# Patient Record
Sex: FEMALE | Race: WHITE | NOT HISPANIC OR LATINO | Employment: OTHER | URBAN - METROPOLITAN AREA
[De-identification: names, ages, dates, MRNs, and addresses within clinical notes are randomized per-mention and may not be internally consistent; named-entity substitution may affect disease eponyms.]

---

## 2017-01-25 ENCOUNTER — PATIENT MESSAGE (OUTPATIENT)
Dept: ADMINISTRATIVE | Facility: OTHER | Age: 57
End: 2017-01-25

## 2017-02-19 ENCOUNTER — PATIENT MESSAGE (OUTPATIENT)
Dept: ADMINISTRATIVE | Facility: OTHER | Age: 57
End: 2017-02-19

## 2017-02-27 ENCOUNTER — PATIENT OUTREACH (OUTPATIENT)
Dept: OTHER | Facility: OTHER | Age: 57
End: 2017-02-27
Payer: COMMERCIAL

## 2017-02-27 NOTE — PROGRESS NOTES
Last 5 Patient Entered Readings                                                               Current 30 Day Average: 121/82     Recent Readings 2/25/2017 2/22/2017 2/22/2017 2/21/2017 2/21/2017    Systolic BP (mmHg) 126 - 109 - 123    Diastolic BP (mmHg) 84 - 79 - 84    Pulse 60 67 66 65 66        Initial introduction completed with the Ms. Fatuma Akins and the role of the health  was explained.   We discussed the following information:  Exercise - Patient goes to the gym 5 times per week for about 1-2 hours per day.  Diet - Pt does not add salt to meals and cooks a lot at home. She reads food labels and tries to find low sodium options. She uses My [employer]itness Pal to track meals and will try to start using this for her sodium intake.     Resources on diet and exercise were sent.     Patient is aware that I am not available for emergencies and to call 911 or [facility] on call if one arises.  Patient is aware of the importance of medication adherence.  Patient is aware of the importance of diet and exercise.  Patient is aware that his sodium intake should be no more than 2000mg per day.  Patient is aware that the recommended physical activity each week should be about 30 minutes per day at least 5 times per week.   Patient is aware of the importance of taking BP readings at least weekly if not more and during different times each day.  Patient is aware that the health  can be used as a resource for lifestyle modifications to help reduce or maintain a healthy BP

## 2017-03-01 ENCOUNTER — OFFICE VISIT (OUTPATIENT)
Dept: OPHTHALMOLOGY | Facility: CLINIC | Age: 57
End: 2017-03-01
Payer: COMMERCIAL

## 2017-03-01 DIAGNOSIS — H52.4 BILATERAL PRESBYOPIA: ICD-10-CM

## 2017-03-01 DIAGNOSIS — I10 ESSENTIAL HYPERTENSION: Primary | ICD-10-CM

## 2017-03-01 DIAGNOSIS — H52.13 MYOPIA, BILATERAL: ICD-10-CM

## 2017-03-01 DIAGNOSIS — Z01.00 ENCOUNTER FOR EYE EXAM: ICD-10-CM

## 2017-03-01 PROCEDURE — 92015 DETERMINE REFRACTIVE STATE: CPT | Mod: S$GLB,,, | Performed by: OPTOMETRIST

## 2017-03-01 PROCEDURE — 92014 COMPRE OPH EXAM EST PT 1/>: CPT | Mod: S$GLB,,, | Performed by: OPTOMETRIST

## 2017-03-01 PROCEDURE — 99999 PR PBB SHADOW E&M-EST. PATIENT-LVL I: CPT | Mod: PBBFAC,,, | Performed by: OPTOMETRIST

## 2017-03-01 NOTE — PROGRESS NOTES
HPI     No visual complaints. Last eye exam 02/25/2016 TRF. update glasses RX.       Last edited by Anabella Love on 3/1/2017  1:34 PM.         Assessment /Plan     For exam results, see Encounter Report.    Essential hypertension    Encounter for eye exam    Myopia, bilateral    Bilateral presbyopia      No HTN Retinopathy    Dispense Final Rx for glasses.  RTC 1 year

## 2017-03-14 ENCOUNTER — PATIENT OUTREACH (OUTPATIENT)
Dept: OTHER | Facility: OTHER | Age: 57
End: 2017-03-14
Payer: COMMERCIAL

## 2017-03-14 NOTE — LETTER
"   Ellen Werner, PharmD  [address] [place] [address]     Dear Fatuma Akins,    Welcome to the [facility]!         My name is Ellen Werner and I am your dedicated clinical pharmacist.  As an expert in medication management, I will help ensure that the medications you are taking continue to provide you with the intended benefits.      This is Yadira Vasquez and she will be your health  for the duration of the program.  Her job is to help you identify lifestyle changes to improve your blood pressure control.  You will talk about nutrition, exercise, and other ways that you may be able to adjust your current habits to better your health. Together, we will work to improve your overall health and encourage you to meet your goals for a healthier lifestyle.    What we expect from YOU:    You will need to take blood pressure readings multiple times a week and no less than one reading per week.   It is important that you take your measurements at different times during the day, when possible.     What you should expect from your Digital Medicine Care Team:   We will provide you with education about high blood pressure, including lifestyle changes that could help you to control your blood pressure.   We will review your weekly readings and provide you with monthly blood pressure progress reports after you have been in the program for more than 30 days.   We will send monthly progress reports on your blood pressure control to your physician so they can follow along with your progress as well.    You will be able to reach me by phone at [phone] or through your [facility] account by clicking "Send a message to your doctor's office" on the home screen then selecting my name in the "To the office of:" field.     I look forward to working with you to achieve your blood pressure goals!    Sincerely,    Ellen Werner, Zackery  Your personal Clinical " Pharmacist    Please visit [email or web address] to learn more about high blood pressure and what you can do lower your blood pressure.                                                                                         Fatuma Akins  [address]  [place] [address]

## 2017-03-21 NOTE — PROGRESS NOTES
Last 5 Patient Entered Readings                                                               Current 30 Day Average: 118/82     Recent Readings 3/20/2017 3/20/2017 3/16/2017 3/13/2017 3/12/2017    Systolic BP (mmHg) 106 115 123 108 124    Diastolic BP (mmHg) 72 81 93 77 81    Pulse 72 64 79 85 61        Hypertension Medications             hydrochlorothiazide (MICROZIDE) 12.5 mg capsule Take 1 capsule (12.5 mg total) by mouth once daily. -qam        Called patient to introduce her into the HDM (hypertension digital medicine) clinic.    Verified the following information with the patient:  Drug allergies  Medication adherence- Patient states she is adherent.     Screening questionnaires:  Depression- not indicated    Sleep apnea- not indicated    Explained that we expect her to obtain several blood pressures/week at random times of day.     Explained that our goal is to get her BP to consistently below 140/90mmHg.     Patient and I agreed that the patient will take her BP daily to every other day at varying times of the day.     I will plan to follow-up with the patient in 4 weeks.    Emailed patient link to [facility] webpage as well as my direct phone number in case she has in questions.

## 2017-03-28 ENCOUNTER — OFFICE VISIT (OUTPATIENT)
Dept: FAMILY MEDICINE | Facility: CLINIC | Age: 57
End: 2017-03-28
Payer: COMMERCIAL

## 2017-03-28 ENCOUNTER — LAB VISIT (OUTPATIENT)
Dept: LAB | Facility: HOSPITAL | Age: 57
End: 2017-03-28
Attending: FAMILY MEDICINE
Payer: COMMERCIAL

## 2017-03-28 VITALS
TEMPERATURE: 97 F | DIASTOLIC BLOOD PRESSURE: 84 MMHG | WEIGHT: 182.75 LBS | BODY MASS INDEX: 34.5 KG/M2 | HEIGHT: 61 IN | SYSTOLIC BLOOD PRESSURE: 120 MMHG | RESPIRATION RATE: 18 BRPM | HEART RATE: 76 BPM | OXYGEN SATURATION: 99 %

## 2017-03-28 DIAGNOSIS — E66.9 OBESITY (BMI 30.0-34.9): ICD-10-CM

## 2017-03-28 DIAGNOSIS — Z12.31 ENCOUNTER FOR SCREENING MAMMOGRAM FOR MALIGNANT NEOPLASM OF BREAST: ICD-10-CM

## 2017-03-28 DIAGNOSIS — I10 ESSENTIAL HYPERTENSION: ICD-10-CM

## 2017-03-28 DIAGNOSIS — E03.9 ACQUIRED HYPOTHYROIDISM: ICD-10-CM

## 2017-03-28 DIAGNOSIS — Z00.00 PREVENTATIVE HEALTH CARE: ICD-10-CM

## 2017-03-28 DIAGNOSIS — E78.1 HYPERTRIGLYCERIDEMIA: ICD-10-CM

## 2017-03-28 DIAGNOSIS — Z00.00 PREVENTATIVE HEALTH CARE: Primary | ICD-10-CM

## 2017-03-28 LAB
ALBUMIN SERPL BCP-MCNC: 4.1 G/DL
ALP SERPL-CCNC: 73 U/L
ALT SERPL W/O P-5'-P-CCNC: 23 U/L
ANION GAP SERPL CALC-SCNC: 11 MMOL/L
AST SERPL-CCNC: 21 U/L
BASOPHILS # BLD AUTO: 0.05 K/UL
BASOPHILS NFR BLD: 0.5 %
BILIRUB SERPL-MCNC: 0.7 MG/DL
BUN SERPL-MCNC: 11 MG/DL
CALCIUM SERPL-MCNC: 9.9 MG/DL
CHLORIDE SERPL-SCNC: 104 MMOL/L
CHOLEST/HDLC SERPL: 4.4 {RATIO}
CO2 SERPL-SCNC: 26 MMOL/L
CREAT SERPL-MCNC: 0.9 MG/DL
DIFFERENTIAL METHOD: NORMAL
EOSINOPHIL # BLD AUTO: 0.3 K/UL
EOSINOPHIL NFR BLD: 3.3 %
ERYTHROCYTE [DISTWIDTH] IN BLOOD BY AUTOMATED COUNT: 13.5 %
EST. GFR  (AFRICAN AMERICAN): >60 ML/MIN/1.73 M^2
EST. GFR  (NON AFRICAN AMERICAN): >60 ML/MIN/1.73 M^2
GLUCOSE SERPL-MCNC: 100 MG/DL
HCT VFR BLD AUTO: 44.3 %
HDL/CHOLESTEROL RATIO: 22.8 %
HDLC SERPL-MCNC: 206 MG/DL
HDLC SERPL-MCNC: 47 MG/DL
HGB BLD-MCNC: 14.7 G/DL
LDLC SERPL CALC-MCNC: 120.8 MG/DL
LYMPHOCYTES # BLD AUTO: 2.9 K/UL
LYMPHOCYTES NFR BLD: 31.8 %
MCH RBC QN AUTO: 29.4 PG
MCHC RBC AUTO-ENTMCNC: 33.2 %
MCV RBC AUTO: 89 FL
MONOCYTES # BLD AUTO: 0.8 K/UL
MONOCYTES NFR BLD: 8.6 %
NEUTROPHILS # BLD AUTO: 5.1 K/UL
NEUTROPHILS NFR BLD: 55.6 %
NONHDLC SERPL-MCNC: 159 MG/DL
PLATELET # BLD AUTO: 325 K/UL
PMV BLD AUTO: 9.8 FL
POTASSIUM SERPL-SCNC: 4.3 MMOL/L
PROT SERPL-MCNC: 7.2 G/DL
RBC # BLD AUTO: 5 M/UL
SODIUM SERPL-SCNC: 141 MMOL/L
TRIGL SERPL-MCNC: 191 MG/DL
TSH SERPL DL<=0.005 MIU/L-ACNC: 3 UIU/ML
WBC # BLD AUTO: 9.14 K/UL

## 2017-03-28 PROCEDURE — 99396 PREV VISIT EST AGE 40-64: CPT | Mod: S$GLB,,, | Performed by: FAMILY MEDICINE

## 2017-03-28 PROCEDURE — 85025 COMPLETE CBC W/AUTO DIFF WBC: CPT

## 2017-03-28 PROCEDURE — 3079F DIAST BP 80-89 MM HG: CPT | Mod: S$GLB,,, | Performed by: FAMILY MEDICINE

## 2017-03-28 PROCEDURE — 80061 LIPID PANEL: CPT

## 2017-03-28 PROCEDURE — 99999 PR PBB SHADOW E&M-EST. PATIENT-LVL III: CPT | Mod: PBBFAC,,, | Performed by: FAMILY MEDICINE

## 2017-03-28 PROCEDURE — 36415 COLL VENOUS BLD VENIPUNCTURE: CPT | Mod: PO

## 2017-03-28 PROCEDURE — 84443 ASSAY THYROID STIM HORMONE: CPT

## 2017-03-28 PROCEDURE — 3074F SYST BP LT 130 MM HG: CPT | Mod: S$GLB,,, | Performed by: FAMILY MEDICINE

## 2017-03-28 PROCEDURE — 80053 COMPREHEN METABOLIC PANEL: CPT

## 2017-03-28 NOTE — PROGRESS NOTES
CHIEF COMPLAINT: This is a 56-year-old female here for preventive health exam.    SUBJECTIVE: Patient's doing well without complaints.  She just started a weight loss diet.  She exercises regularly.  Her blood pressure is controlled on hydrochlorothiazide 12.5 mg daily.  She also takes levothyroxine for acquired hypothyroidism.     Eye exam March 2017. Pap smear March 2016, due again in March 2019.  Colonoscopy August 2013, due again in August 2023. Mammogram March 2015. Tdap August 2009. Flu vaccine November 2016.     ROS:  GENERAL: Patient denies fever, chills, night sweats. Patient denies weight gain or loss. Patient denies anorexia, fatigue, weakness or swollen glands.  SKIN: Patient denies rash or hair loss.  HEENT: Patient denies sore throat, ear pain, hearing loss, nasal congestion, or runny nose. Patient denies visual disturbance, eye irritation or discharge.  LUNGS: Patient denies cough, wheeze or hemoptysis.  CARDIOVASCULAR: Patient denies chest pain, shortness of breath, palpitations, syncope or lower extremity edema.  GI: Patient denies abdominal pain, nausea, vomiting, diarrhea, constipation, blood in stool or melena.  GENITOURINARY: Patient denies pelvic pain, vaginal discharge, itch or odor. Patient denies irregular vaginal bleeding. Patient denies dysuria, frequency, hematuria, nocturia, urgency or incontinence.  BREASTS: Patient denies breast pain, mass or nipple discharge.  MUSCULOSKELETAL: Patient denies joint pain, swelling, redness or warmth.  NEUROLOGIC: Patient denies headache, vertigo, paresthesias, weakness in limb, dysarthria, dysphagia or abnormality of gait.  PSYCHIATRIC: Patient denies anxiety, depression, or memory loss.     OBJECTIVE:   GENERAL: Well-developed well-nourished, obese, white female alert and oriented x3, in no acute distress. Memory, judgment and cognition without deficit.  SKIN: Clear without rash. Normal color and tone.  HEENT: Eyes: Clear conjunctivae. Pupils equal  reactive to light and accommodation. Ears: Clear TMs. Clear canals. Nose: Without congestion. Pharynx: Without injection or exudates.  NECK: Supple, normal range of motion. No masses, lymphadenopathy or enlarged thyroid. No JVD. Carotids 2+ and equal. No bruits.  LUNGS: Clear to auscultation. Normal respiratory effort.  CARDIOVASCULAR: Regular rhythm, normal S1, S2 without murmur, gallop or rub.  BACK: No CVA or spinal tenderness.  BREASTS: No masses, tenderness or nipple discharge.  ABDOMEN: Normal appearance. Active bowel sounds. Soft, nontender without mass or organomegaly. No rebound or guarding.  EXTREMITIES: Without cyanosis, clubbing or edema. Distal pulses 2+ and equal. Normal range of motion in all extremities. No joint effusion, erythema or warmth.  NEUROLOGIC: Cranial nerves II through XII without deficit. Motor strength equal bilaterally. Sensation normal to touch. Deep tendon reflexes 2+ and equal. Gait without abnormality. No tremor. Negative cerebellar signs.  PELVIC: External: Without lesions or inflammation. Vaginal: Clear, atrophic changes. Cervix: Friable, atrophic. Nontender.  No Pap. Uterus: Normal size and shape. Adnexa: Non-tender, without masses. Rectovaginal: Confirms, heme-negative stool x2.    ASSESSMENT:  1. Preventative health care    2. Acquired hypothyroidism    3. Essential hypertension    4. Hypertriglyceridemia    5. Obesity (BMI 30.0-34.9)    6. Encounter for screening mammogram for malignant neoplasm of breast      PLAN:   1.  Weight reduction.  Exercise regularly.  2.  Age-appropriate counseling.  3.  Fasting lab.  4.  Mammogram.  5.  Refill medications as needed.

## 2017-03-28 NOTE — MR AVS SNAPSHOT
[facility]  [address]  [place] [address]  Phone: [phone]                  Fatuma Akins   3/28/2017 9:00 AM   Office Visit    Description:  Female : 1960   Provider:  Mercy Fitzpatrick MD   Department:  [facility]           Reason for Visit     Annual Exam           Diagnoses this Visit        Comments    Preventative health care    -  Primary     Acquired hypothyroidism         Essential hypertension         Hypertriglyceridemia         Obesity (BMI 30.0-34.9)         Encounter for screening mammogram for malignant neoplasm of breast                To Do List           Future Appointments        Provider Department Dept Phone    3/28/2017 10:20 AM LABORATORY, [address] [facility] [phone]    2017 10:30 AM [facility] MAMMO1-SCR [facility] [phone]    3/2/2018 1:30 PM [facility] - Ophthalmology [phone]      Goals (5 Years of Data)              Today    3/27/17    3/20/17    Blood Pressure <= 140/90   120/84  120/84  120/84    Notes - Note created  2017  2:52 PM by Yadira Vasquez    It is important to consistently maintain a controlled blood pressure.      Exercise at least 150 minutes per week.           Notes - Note created  3/3/2017 12:26 PM by Yadira Vasquez    Continue going to the gym 5 days per week for 1-2 hours each time.      Take at least one BP reading per week at various times of the day           Weight (lb) < 0   82.9 kg (182 lb 12.2 oz)          [facility] On Call     [facility] On Call Nurse Care Line -  Assistance  Registered nurses in the [facility] Center provide clinical advisement, health education, appointment booking, and other advisory services.  Call for this free service at [phone].             Medications           Message regarding Medications     Verify the changes and/or additions to your medication regime listed  "below are the same as discussed with your clinician today.  If any of these changes or additions are incorrect, please notify your healthcare provider.             Verify that the below list of medications is an accurate representation of the medications you are currently taking.  If none reported, the list may be blank. If incorrect, please contact your healthcare provider. Carry this list with you in case of emergency.           Current Medications     calcium-vitamin D3 500 mg(1,250mg) -200 unit per tablet Take 1 tablet by mouth 2 (two) times daily with meals.    coQ10, ubiquinol, 200 mg Cap Take by mouth.    fish oil-omega-3 fatty acids 300-1,000 mg capsule Take 2 g by mouth once daily.    hydrochlorothiazide (MICROZIDE) 12.5 mg capsule Take 1 capsule (12.5 mg total) by mouth once daily.    levothyroxine (SYNTHROID) 50 MCG tablet TAKE 1 TABLET (50 MCG TOTAL) BY MOUTH ONCE DAILY.    magnesium 250 mg Tab Take 1 tablet by mouth once daily.    multivitamin with minerals tablet Take 1 tablet by mouth once daily.           Clinical Reference Information           Your Vitals Were     BP Pulse Temp Resp Height Weight    120/84 76 96.6 °F (35.9 °C) (Tympanic) 18 5' 1" (1.549 m) 82.9 kg (182 lb 12.2 oz)    SpO2 BMI             99% 34.53 kg/m2         Blood Pressure          Most Recent Value    BP  120/84      Allergies as of 3/28/2017     No Known Allergies      Immunizations Administered on Date of Encounter - 3/28/2017     None      Orders Placed During Today's Visit     Future Labs/Procedures Expected by Expires    CBC auto differential  3/28/2017 5/27/2018    Comprehensive metabolic panel  3/28/2017 5/27/2018    Lipid panel  3/28/2017 5/27/2018    Mammo Digital Screening Bilat with CAD  3/28/2017 5/28/2018    TSH  3/28/2017 5/27/2018      Language Assistance Services     ATTENTION: Language assistance services are available, free of charge. Please call [phone].      ATENCIÓN: chen Peralta " disposición servicios gratuitos de asistencia lingüística. Edwardestella al [phone].     LEANDRO Ý: N?u b?n nói Ti?ng Vi?t, có các d?ch v? h? tr? ngôn ng? mi?n phí dành cho b?n. G?i s? [phone].         [facility] complies with applicable Federal civil rights laws and does not discriminate on the basis of race, color, national origin, age, disability, or sex.

## 2017-03-30 ENCOUNTER — PATIENT OUTREACH (OUTPATIENT)
Dept: OTHER | Facility: OTHER | Age: 57
End: 2017-03-30
Payer: COMMERCIAL

## 2017-03-30 NOTE — PROGRESS NOTES
Last 5 Patient Entered Readings                                                               Current 30 Day Average: 116/82     Recent Readings 3/27/2017 3/20/2017 3/20/2017 3/16/2017 3/13/2017    Systolic BP (mmHg) 116 106 115 123 108    Diastolic BP (mmHg) 84 72 81 93 77    Pulse 66 72 64 79 85        LVM to follow up with Ms. Fatuma Akins. Inquired about how her low sodium diet and exercise is going. Overall, her readings are looking good and she continues to take her readings weekly. Advised pt to call or message back if she has any questions or concerns.

## 2017-04-03 ENCOUNTER — HOSPITAL ENCOUNTER (OUTPATIENT)
Dept: RADIOLOGY | Facility: HOSPITAL | Age: 57
Discharge: HOME | End: 2017-04-03
Attending: FAMILY MEDICINE
Payer: COMMERCIAL

## 2017-04-03 DIAGNOSIS — Z12.31 ENCOUNTER FOR SCREENING MAMMOGRAM FOR MALIGNANT NEOPLASM OF BREAST: ICD-10-CM

## 2017-04-03 PROCEDURE — 77067 SCR MAMMO BI INCL CAD: CPT | Mod: TC

## 2017-04-03 PROCEDURE — 77067 SCR MAMMO BI INCL CAD: CPT | Mod: 26,,, | Performed by: RADIOLOGY

## 2017-04-25 ENCOUNTER — PATIENT OUTREACH (OUTPATIENT)
Dept: OTHER | Facility: OTHER | Age: 57
End: 2017-04-25
Payer: COMMERCIAL

## 2017-04-25 NOTE — PROGRESS NOTES
Last 5 Patient Entered Readings                                                               Current 30 Day Average: 116/81     Recent Readings 4/23/2017 4/23/2017 4/13/2017 4/5/2017 3/27/2017    Systolic BP (mmHg) 131 129 106 112 116    Diastolic BP (mmHg) 89 94 74 78 84    Pulse 64 64 68 74 66        Hypertension Medications             hydrochlorothiazide (MICROZIDE) 12.5 mg capsule Take 1 capsule (12.5 mg total) by mouth once daily.        Plan:   Called patient to follow up. Per current 30 day average, BP is well controlled. LVM.   Will continue to monitor. WCB in 4 months, sooner if BP begins to trend up or down.

## 2017-06-01 ENCOUNTER — PATIENT OUTREACH (OUTPATIENT)
Dept: OTHER | Facility: OTHER | Age: 57
End: 2017-06-01
Payer: COMMERCIAL

## 2017-06-01 NOTE — PROGRESS NOTES
Last 5 Patient Entered Readings                                                               Current 30 Day Average: 115/82     Recent Readings 5/31/2017 5/22/2017 5/16/2017 5/8/2017 4/30/2017    Systolic BP (mmHg) 114 121 115 112 114    Diastolic BP (mmHg) 82 82 81 86 84    Pulse 67 71 78 67 68        LVM to follow up with Mrs. Fatuma Akins. Inquired about how her low sodium diet and exercise is going. Overall, her BP readings are looking good and she continues to take them weekly.  Advised pt to call or message back if she has any questions or concerns.

## 2017-06-14 RX ORDER — HYDROCHLOROTHIAZIDE 12.5 MG/1
12.5 CAPSULE ORAL DAILY
Qty: 90 CAPSULE | Refills: 3 | Status: SHIPPED | OUTPATIENT
Start: 2017-06-14 | End: 2017-07-26 | Stop reason: SDUPTHER

## 2017-06-14 RX ORDER — LEVOTHYROXINE SODIUM 50 UG/1
TABLET ORAL
Qty: 90 TABLET | Refills: 3 | Status: SHIPPED | OUTPATIENT
Start: 2017-06-14 | End: 2017-07-26 | Stop reason: SDUPTHER

## 2017-07-06 ENCOUNTER — PATIENT OUTREACH (OUTPATIENT)
Dept: OTHER | Facility: OTHER | Age: 57
End: 2017-07-06

## 2017-07-06 NOTE — PROGRESS NOTES
Last 5 Patient Entered Readings                                                               Current 30 Day Average: 116/83     Recent Readings 7/5/2017 6/28/2017 6/22/2017 6/14/2017 6/5/2017    Systolic BP (mmHg) 119 113 108 120 119    Diastolic BP (mmHg) 81 84 82 87 81    Pulse 63 59 65 65 65            LVM to follow up with Mrs. Fatuma Akins. Inquired about how her low sodium diet and exercise is going. Overall, her readings are looking good. I just reminded her to continue working on getting at least one reading in each week. Advised pt to call or message back if she has any questions or concerns.

## 2017-07-26 RX ORDER — LEVOTHYROXINE SODIUM 50 UG/1
50 TABLET ORAL DAILY
Qty: 90 TABLET | Refills: 2 | Status: SHIPPED | OUTPATIENT
Start: 2017-07-26 | End: 2018-04-18 | Stop reason: SDUPTHER

## 2017-07-26 RX ORDER — HYDROCHLOROTHIAZIDE 12.5 MG/1
12.5 CAPSULE ORAL DAILY
Qty: 90 CAPSULE | Refills: 2 | Status: SHIPPED | OUTPATIENT
Start: 2017-07-26 | End: 2018-04-18 | Stop reason: SDUPTHER

## 2017-08-09 ENCOUNTER — PATIENT OUTREACH (OUTPATIENT)
Dept: OTHER | Facility: OTHER | Age: 57
End: 2017-08-09

## 2017-08-09 NOTE — PROGRESS NOTES
Last 5 Patient Entered Redings Current 30 Day Average: 115/83     Recent Readings 8/7/2017 8/1/2017 7/25/2017 7/17/2017 7/10/2017    Systolic BP (mmHg) 103 115 126 118 114    Diastolic BP (mmHg) 74 86 87 83 86    Pulse 70 73 64 66 67          LVM to follow up with Mrs. Fatuma Akins. Inquired about how her low sodium diet and exercise is going. Advised pt to call or message back if she has any questions or concerns.    Are the patients BP readings overall controlled? yes  Does the patient take weekly BP readings? yes  Did you request for the patient to call or message you back? no  If yes, then why? NA    Reminded patient about what to do incase of a medical emergency (call 911, call [facility] on call or go to the ER).     Provided patient with my contact information.

## 2017-08-25 ENCOUNTER — PATIENT OUTREACH (OUTPATIENT)
Dept: OTHER | Facility: OTHER | Age: 57
End: 2017-08-25

## 2017-08-25 NOTE — PROGRESS NOTES
Last 5 Patient Entered Redings Current 30 Day Average: 111/78     Recent Readings 8/22/2017 8/15/2017 8/7/2017 8/1/2017 7/25/2017    Systolic BP (mmHg) 109 115 103 115 126    Diastolic BP (mmHg) 73 77 74 86 87    Pulse 71 68 70 73 64        Hypertension Medications             hydrochlorothiazide (MICROZIDE) 12.5 mg capsule Take 1 capsule (12.5 mg total) by mouth once daily.        Plan:   Called patient to follow up. Per current 30 day average, BP is well controlled.   LVM, requested patient call back if she has any questions or concerns.   Will continue to monitor. WCB in 4 months, sooner if BP begins to trend up or down.

## 2017-10-02 ENCOUNTER — PATIENT OUTREACH (OUTPATIENT)
Dept: OTHER | Facility: OTHER | Age: 57
End: 2017-10-02

## 2017-10-02 NOTE — PROGRESS NOTES
Last 5 Patient Entered Redings Current 30 Day Average: 118/84     Recent Readings 10/2/2017 9/25/2017 9/18/2017 9/11/2017 9/4/2017    Systolic BP (mmHg) 125 108 116 122 117    Diastolic BP (mmHg) 88 79 81 86 85    Pulse 66 66 67 68 59          Hypertension Digital Medicine (HDMP) Health  Follow Up    LVM to follow up with Mrs. Fatuma MERRILL Tashi.    Per 30 day average, blood pressure is well controlled 118/84 mmHg. Encouraged adherence to low sodium diet and physical activity guidelines. Advised patient to call or message with questions or concerns. WCB in 3-4 weeks.

## 2017-10-12 ENCOUNTER — TELEPHONE (OUTPATIENT)
Dept: OPHTHALMOLOGY | Facility: CLINIC | Age: 57
End: 2017-10-12

## 2017-10-12 NOTE — TELEPHONE ENCOUNTER
----- Message from Rajesh Lei sent at 10/12/2017 11:59 AM CDT -----  Contact: patient  Patient is calling requesting that Dr Hamilton or Dr. Hamilton's Nurse would give her a call at [phone]. Thank You.

## 2017-10-17 ENCOUNTER — OFFICE VISIT (OUTPATIENT)
Dept: OPHTHALMOLOGY | Facility: CLINIC | Age: 57
End: 2017-10-17
Payer: COMMERCIAL

## 2017-10-17 DIAGNOSIS — H00.15 CHALAZION OF LEFT LOWER EYELID: Primary | ICD-10-CM

## 2017-10-17 PROCEDURE — 92015 DETERMINE REFRACTIVE STATE: CPT | Mod: S$GLB,,, | Performed by: OPTOMETRIST

## 2017-10-17 PROCEDURE — 92012 INTRM OPH EXAM EST PATIENT: CPT | Mod: S$GLB,,, | Performed by: OPTOMETRIST

## 2017-10-17 PROCEDURE — 99999 PR PBB SHADOW E&M-EST. PATIENT-LVL I: CPT | Mod: PBBFAC,,, | Performed by: OPTOMETRIST

## 2017-10-17 NOTE — PROGRESS NOTES
HPI     Patient states glasses broke wants a refraction for glasses to make sure   there has not been any changes. Last eye exam 03/01/2017 TRF.    Last edited by Anabella Love on 10/17/2017 10:06 AM. (History)            Assessment /Plan     For exam results, see Encounter Report.    Chalazion of left lower eyelid      Warm compresses qid OS    Dispense Final Rx for glasses.  RTC prn

## 2017-10-24 ENCOUNTER — PATIENT OUTREACH (OUTPATIENT)
Dept: OTHER | Facility: OTHER | Age: 57
End: 2017-10-24

## 2017-10-24 NOTE — PROGRESS NOTES
Last 5 Patient Entered Redings Current 30 Day Average: 119/82     Recent Readings 10/23/2017 10/23/2017 10/16/2017 10/9/2017 10/2/2017    Systolic BP (mmHg) 121 124 115 128 125    Diastolic BP (mmHg) 84 90 76 83 88    Pulse 64 64 65 61 66          Hypertension Digital Medicine (HDMP) Health  Follow Up    LVM to follow up with Mrs. Fatuma MERRILL Tashi.    Per 30 day average, blood pressure is well controlled 119/82 mmHg. Encouraged adherence to low sodium diet and physical activity guidelines. Advised patient to call or message with questions or concerns.

## 2017-11-01 ENCOUNTER — TELEPHONE (OUTPATIENT)
Dept: FAMILY MEDICINE | Facility: CLINIC | Age: 57
End: 2017-11-01

## 2017-11-01 ENCOUNTER — IMMUNIZATION (OUTPATIENT)
Dept: FAMILY MEDICINE | Facility: CLINIC | Age: 57
End: 2017-11-01
Payer: COMMERCIAL

## 2017-11-01 PROCEDURE — 90471 IMMUNIZATION ADMIN: CPT | Mod: S$GLB,,, | Performed by: FAMILY MEDICINE

## 2017-11-01 PROCEDURE — 90686 IIV4 VACC NO PRSV 0.5 ML IM: CPT | Mod: S$GLB,,, | Performed by: FAMILY MEDICINE

## 2017-11-01 PROCEDURE — 99999 PR PBB SHADOW E&M-EST. PATIENT-LVL I: CPT | Mod: PBBFAC,,,

## 2017-11-01 NOTE — TELEPHONE ENCOUNTER
She is up to date except for the flu vaccine which she should get no later than 1-2 weeks prior to her trip.

## 2017-11-02 NOTE — TELEPHONE ENCOUNTER
----- Message from Adeline Buitrago sent at 11/1/2017  3:05 PM CDT -----  Contact: self [phone]  Returning call,please call back at [phone].  Md Amber

## 2017-11-14 ENCOUNTER — PATIENT MESSAGE (OUTPATIENT)
Dept: OTHER | Facility: OTHER | Age: 57
End: 2017-11-14

## 2017-11-14 NOTE — PROGRESS NOTES
Last 5 Patient Entered Readings Current 30 Day Average: 121/82     Recent Readings 11/14/2017 11/6/2017 11/1/2017 10/23/2017 10/23/2017    Systolic BP (mmHg) 126 121 120 121 124    Diastolic BP (mmHg) 83 84 83 84 90    Pulse 66 76 68 64 64          Patient messaged me to let me know that she is going out of town and will not be back until the first week of December. I will place her on hiatus during that time.

## 2017-12-11 ENCOUNTER — PATIENT OUTREACH (OUTPATIENT)
Dept: OTHER | Facility: OTHER | Age: 57
End: 2017-12-11

## 2017-12-11 NOTE — PROGRESS NOTES
"Last 5 Patient Entered Readings Current 30 Day Average: 120/84     Recent Readings 12/11/2017 12/11/2017 12/7/2017 11/14/2017 11/6/2017    Systolic BP (mmHg) 119 112 116 126 121    Diastolic BP (mmHg) 85 83 83 83 84    Pulse 65 65 60 66 76          Hypertension Digital Medicine Program (HDMP): Health  Follow Up    Lifestyle Modifications:    1.Low sodium diet: yes : Overall, the patient maintains a low sodium diet. She cooks the majority of her food at home and only eats out maybe once a week. The only thing she mentioned that she does "sometimes" is eat a lean cuisine meal whenever she does not have anything in the house to cook. She does get the lowest sodium option they offer and again, this is something she rarely does. We spoke about the importance of low sodium options so she will continue reading food labels and choose the lowest sodium options available.     2.Physical activity: yes : Patient continues going to the gym about 5 times a week.     3.Hypotension/Hypertension symptoms: no   Frequency/Alleviating factors/Precipitating factors, etc.     4.Patient has been compliant with the medication regimen.     We discussed the new BP guidelines and she expressed understanding.     Follow up with Mrs. Fatuma Akins completed. No further questions or concerns. I will follow up in a few weeks to assess progress.     "

## 2017-12-18 ENCOUNTER — PATIENT OUTREACH (OUTPATIENT)
Dept: OTHER | Facility: OTHER | Age: 57
End: 2017-12-18

## 2017-12-18 NOTE — PROGRESS NOTES
Last 5 Patient Entered Readings Current 30 Day Average: 117/83       Units 12/18/2017 12/11/2017 12/11/2017 12/7/2017 11/14/2017    Time -  1:15 PM 11:53 AM 11:48 AM  9:03 AM 10:34 AM    Systolic Blood Pressure - 117 119 112 116 126    Diastolic Blood Pressure - 81 85 83 83 83    Pulse bpm 67 65 65 60 66        Hypertension Medications             hydrochlorothiazide (MICROZIDE) 12.5 mg capsule Take 1 capsule (12.5 mg total) by mouth once daily.        Assessment/ Plan:   Called patient to follow up. Per newly released 2017 ACC/ AHA HTN guidelines (goal of BP < 130/80), current 30-day average is uncontrolled (DBP).  Discussed new goals with patient.   Patient denies having questions or concerns. Instructed patient to call if she has any questions or concerns, patient confirms understanding.   Will continue to monitor. WCB in 6 weeks.   If DBP remains elevated, will discuss increasing HCTZ to 25mg.

## 2018-01-02 ENCOUNTER — OFFICE VISIT (OUTPATIENT)
Dept: FAMILY MEDICINE | Facility: CLINIC | Age: 58
End: 2018-01-02
Payer: COMMERCIAL

## 2018-01-02 VITALS
HEART RATE: 76 BPM | SYSTOLIC BLOOD PRESSURE: 98 MMHG | RESPIRATION RATE: 18 BRPM | BODY MASS INDEX: 34.13 KG/M2 | TEMPERATURE: 100 F | DIASTOLIC BLOOD PRESSURE: 62 MMHG | WEIGHT: 180.75 LBS | HEIGHT: 61 IN

## 2018-01-02 DIAGNOSIS — R50.9 FEVER, UNSPECIFIED FEVER CAUSE: ICD-10-CM

## 2018-01-02 DIAGNOSIS — R05.9 COUGH: ICD-10-CM

## 2018-01-02 DIAGNOSIS — J10.1 INFLUENZA A: ICD-10-CM

## 2018-01-02 LAB
CTP QC/QA: YES
FLUAV AG NPH QL: POSITIVE
FLUBV AG NPH QL: NEGATIVE

## 2018-01-02 PROCEDURE — 99213 OFFICE O/P EST LOW 20 MIN: CPT | Mod: 25,S$GLB,, | Performed by: FAMILY MEDICINE

## 2018-01-02 PROCEDURE — 87804 INFLUENZA ASSAY W/OPTIC: CPT | Mod: QW,S$GLB,, | Performed by: FAMILY MEDICINE

## 2018-01-02 PROCEDURE — 99999 PR PBB SHADOW E&M-EST. PATIENT-LVL III: CPT | Mod: PBBFAC,,, | Performed by: FAMILY MEDICINE

## 2018-01-02 RX ORDER — OSELTAMIVIR PHOSPHATE 75 MG/1
75 CAPSULE ORAL 2 TIMES DAILY
Qty: 10 CAPSULE | Refills: 0 | Status: SHIPPED | OUTPATIENT
Start: 2018-01-02 | End: 2018-01-07

## 2018-01-02 NOTE — PROGRESS NOTES
Subjective:       Patient ID: Fatuma Akins is a 57 y.o. female.    Chief Complaint: Fever and Cough    Ms. Akins, a nonsmoker, comes in today with complaints of having fever and Sunday evening.  She states she has been taken Tylenol every 4 hours but states fever still hangs around 100.  She also reports having dry cough, slight fatigue, decreased appetite, slight headache with dizziness, slight body aches.  She states she has been taking Delsym without help for cough.  Otherwise, she denies having chills, wheezing, shortness of breath, urinary symptoms, abdominal pain, nausea, vomiting, diarrhea, chest pain, palpitations, chills, nasal or ocular symptoms.    She states she had flu shot this fall.  She reports history of pneumonia as a child.  She reports no exposure to known ill contacts.    She is followed by PCP Dr. Mercy Fitzpatrick.    Current Outpatient Prescriptions:  calcium-vitamin D3 500 mg(1,250mg) -200 unit per tablet, Take 1 tablet by mouth 2 (two) times daily with meals.  coQ10, ubiquinol, 200 mg Cap, Take by mouth.  fish oil-omega-3 fatty acids 300-1,000 mg capsule, Take 2 g by mouth once daily.  hydrochlorothiazide (MICROZIDE) 12.5 mg capsule, Take 1 capsule (12.5 mg total) by mouth once daily.  levothyroxine (SYNTHROID) 50 MCG tablet, Take 1 tablet (50 mcg total) by mouth once daily.  magnesium 250 mg Tab, Take 1 tablet by mouth once daily.  multivitamin with minerals tablet, Take 1 tablet by mouth once daily.    Nasal swab for influenza A/B ordered by me today - (+/-).        Fever    Associated symptoms include coughing and headaches. Pertinent negatives include no abdominal pain, chest pain, congestion, diarrhea, nausea, sore throat, vomiting or wheezing.   Cough   Associated symptoms include a fever, headaches and myalgias. Pertinent negatives include no chest pain, chills, eye redness, postnasal drip, rhinorrhea, sore throat, shortness of breath or wheezing.     Review of Systems    Constitutional: Positive for appetite change, fatigue and fever. Negative for chills.   HENT: Negative for congestion, postnasal drip, rhinorrhea, sinus pain, sinus pressure, sneezing and sore throat.    Eyes: Negative for pain, discharge, redness and itching.   Respiratory: Positive for cough. Negative for shortness of breath and wheezing.    Cardiovascular: Negative for chest pain and palpitations.   Gastrointestinal: Negative for abdominal pain, diarrhea, nausea and vomiting.   Genitourinary: Negative for difficulty urinating.   Musculoskeletal: Positive for myalgias.   Neurological: Positive for dizziness and headaches.       Objective:      Physical Exam   Constitutional: She appears well-developed and well-nourished. No distress.   Pleasant.   HENT:   Head: Normocephalic and atraumatic.   Right Ear: External ear normal.   Left Ear: External ear normal.   Nose: Nose normal.   Mouth/Throat: Oropharynx is clear and moist. No oropharyngeal exudate.   Non tender sinuses. TM's shiny and clear. Nasal mucosa pink, not congested without drainage noted.   Eyes: EOM are normal. Pupils are equal, round, and reactive to light. Right eye exhibits no discharge. Left eye exhibits no discharge.   She wears glasses.   Neck: Normal range of motion. Neck supple. No thyromegaly present.   Cardiovascular: Normal rate, regular rhythm, normal heart sounds and intact distal pulses.    Pulmonary/Chest: Effort normal and breath sounds normal. No respiratory distress. She has no wheezes.   Abdominal: Soft. Bowel sounds are normal. She exhibits no distension and no mass. There is no tenderness. There is no guarding.   Musculoskeletal: Normal range of motion. She exhibits no edema or tenderness.   She is ambulatory without problems.   Lymphadenopathy:     She has no cervical adenopathy.   Skin: She is not diaphoretic.   Psychiatric: She has a normal mood and affect. Her behavior is normal. Judgment and thought content normal.   Vitals  reviewed.      Assessment:       1. Influenza A    2. Fever, unspecified fever cause    3. Cough        Plan:       1.  Tamiflu 75 mg twice daily x 5 days.  2.  Continue symptomatic treatment along with hydration, rest.  3.  Continue current medications, follow low sodium, low cholesterol, low carb diet, daily walks.  4.  Follow up sooner if no improvement or worsening symptoms noted.

## 2018-01-10 ENCOUNTER — PATIENT OUTREACH (OUTPATIENT)
Dept: OTHER | Facility: OTHER | Age: 58
End: 2018-01-10

## 2018-01-10 NOTE — PROGRESS NOTES
Last 5 Patient Entered Readings Current 30 Day Average: 118/83     Recent Readings 1/10/2018 1/1/2018 12/25/2017 12/25/2017 12/25/2017    SBP (mmHg) 119 115 122 134 134    DBP (mmHg) 79 86 84 94 96    Pulse 65 79 66 62 59          Hypertension Digital Medicine (HDMP) Health  Follow Up    LVM to follow up with Mrs. Fatuma Akins.    Per 30 day average, blood pressure is well controlled 118/83 mmHg. Encouraged adherence to low sodium diet and physical activity guidelines. Advised patient to call or message with questions or concerns.

## 2018-01-29 ENCOUNTER — PATIENT OUTREACH (OUTPATIENT)
Dept: OTHER | Facility: OTHER | Age: 58
End: 2018-01-29

## 2018-01-29 NOTE — PROGRESS NOTES
Last 5 Patient Entered Readings                                      Current 30 Day Average: 119/83     Recent Readings 1/22/2018 1/15/2018 1/10/2018 1/1/2018 12/25/2017    SBP (mmHg) 120 120 119 115 122    DBP (mmHg) 86 81 79 86 84    Pulse 68 73 65 79 66        Hypertension Medications             hydrochlorothiazide (MICROZIDE) 12.5 mg capsule Take 1 capsule (12.5 mg total) by mouth once daily.        Plan:   Called patient to follow up. Per newly released 2017 ACC/ AHA HTN guidelines  (goal of BP < 130/80), current 30-day average is slightly uncontrolled (DBP).  LVM, requested patient call back at her convenience if she has any questions or concerns, and to continue to take at least weekly BP readings.   Will continue to monitor. WCB in 3 months, sooner if BP begins to trend up or down.

## 2018-02-28 ENCOUNTER — PATIENT OUTREACH (OUTPATIENT)
Dept: OTHER | Facility: OTHER | Age: 58
End: 2018-02-28

## 2018-02-28 NOTE — PROGRESS NOTES
Last 5 Patient Entered Readings                                      Current 30 Day Average: 114/79     Recent Readings 2/27/2018 2/19/2018 2/13/2018 2/5/2018 1/29/2018    SBP (mmHg) 110 111 122 115 110    DBP (mmHg) 79 68 87 82 77    Pulse 62 75 62 67 68          Hypertension Digital Medicine (HDMP) Health  Follow Up    LVM to follow up with Ms. Fatuma Akins.    Per 30 day average, blood pressure is well controlled 114/79 mmHg. Encouraged adherence to low sodium diet and physical activity guidelines. Advised patient to call or message with questions or concerns.

## 2018-03-12 ENCOUNTER — OFFICE VISIT (OUTPATIENT)
Dept: FAMILY MEDICINE | Facility: CLINIC | Age: 58
End: 2018-03-12
Payer: COMMERCIAL

## 2018-03-12 VITALS
OXYGEN SATURATION: 100 % | BODY MASS INDEX: 33.17 KG/M2 | DIASTOLIC BLOOD PRESSURE: 72 MMHG | HEART RATE: 112 BPM | TEMPERATURE: 98 F | SYSTOLIC BLOOD PRESSURE: 114 MMHG | WEIGHT: 175.69 LBS | RESPIRATION RATE: 18 BRPM | HEIGHT: 61 IN

## 2018-03-12 DIAGNOSIS — J45.909 ACUTE ASTHMATIC BRONCHITIS: Primary | ICD-10-CM

## 2018-03-12 PROCEDURE — 3078F DIAST BP <80 MM HG: CPT | Mod: CPTII,S$GLB,, | Performed by: FAMILY MEDICINE

## 2018-03-12 PROCEDURE — 99213 OFFICE O/P EST LOW 20 MIN: CPT | Mod: 25,S$GLB,, | Performed by: FAMILY MEDICINE

## 2018-03-12 PROCEDURE — 96372 THER/PROPH/DIAG INJ SC/IM: CPT | Mod: S$GLB,,, | Performed by: FAMILY MEDICINE

## 2018-03-12 PROCEDURE — 99999 PR PBB SHADOW E&M-EST. PATIENT-LVL III: CPT | Mod: PBBFAC,,, | Performed by: FAMILY MEDICINE

## 2018-03-12 PROCEDURE — 3074F SYST BP LT 130 MM HG: CPT | Mod: CPTII,S$GLB,, | Performed by: FAMILY MEDICINE

## 2018-03-12 RX ORDER — METHYLPREDNISOLONE 4 MG/1
TABLET ORAL
Qty: 1 PACKAGE | Refills: 0 | Status: SHIPPED | OUTPATIENT
Start: 2018-03-12 | End: 2018-04-05

## 2018-03-12 RX ORDER — BETAMETHASONE SODIUM PHOSPHATE AND BETAMETHASONE ACETATE 3; 3 MG/ML; MG/ML
12 INJECTION, SUSPENSION INTRA-ARTICULAR; INTRALESIONAL; INTRAMUSCULAR; SOFT TISSUE
Status: COMPLETED | OUTPATIENT
Start: 2018-03-12 | End: 2018-03-12

## 2018-03-12 RX ADMIN — BETAMETHASONE SODIUM PHOSPHATE AND BETAMETHASONE ACETATE 12 MG: 3; 3 INJECTION, SUSPENSION INTRA-ARTICULAR; INTRALESIONAL; INTRAMUSCULAR; SOFT TISSUE at 02:03

## 2018-03-12 NOTE — PROGRESS NOTES
CHIEF COMPLAINT: This is a 57-year-old female complaining of respiratory symptoms.    SUBJECTIVE: Patient was a one-week history of nonproductive cough, postnasal drip, nasal congestion and wheezing in her upper chest.  She complains of shortness breath with exertion.  Patient had low-grade fever for 1 day, which has resolved.  She denies ear pain or hearing loss.  Sore throat resolved.  Patient's been taking Mucinex and Claritin without relief.  Negative ill contacts.  She has no history of asthma, but has had seasonal respiratory symptoms in the past.      ROS:  GENERAL: Patient denies fever, chills, night sweats.  Patient denies weight gain or loss. Patient denies anorexia, fatigue, weakness or swollen glands.  SKIN: Patient denies rash.  HEENT: Patient denies ear pain, hearing loss, visual disturbance, eye irritation or discharge.  LUNGS: Patient denies hemoptysis.  CARDIOVASCULAR: Patient denies chest pain,, palpitations, syncope or lower extremity edema.  GI: Patient denies abdominal pain, nausea, vomiting, diarrhea, constipation, blood in stool or melena.    OBJECTIVE:   GENERAL: Well-developed well-nourished obese white female alert and oriented x3 in no acute distress.  Memory, judgment and cognition without deficit.  No audible wheezing.  SKIN: Clear without rash.  Normal color and tone.  HEENT: Eyes: Clear conjunctivae.  No scleral icterus.  Ears: Clear canals.  Clear TMs.  Nose: Mild bilateral congestion.  Pharynx: Without injection or exudates.  NECK: Supple, normal range of motion.  No lymphadenopathy.    LUNGS: Bilateral diffuse rhonchi with scattered expiratory wheezes.  Normal respiratory effort.  CARDIOVASCULAR: Regular rhythm, normal S1, S2 without murmur, gallop or rub.  BACK: No CVA or spinal tenderness.    ASSESSMENT:  1. Acute asthmatic bronchitis      PLAN:   1.  Celestone 12 mg IM.  2.  Medrol Dosepak.  3.  Benadryl as needed.  Increase fluid intake.  4.  Follow-up if no improvement or  worsening symptoms.

## 2018-03-26 ENCOUNTER — PATIENT OUTREACH (OUTPATIENT)
Dept: OTHER | Facility: OTHER | Age: 58
End: 2018-03-26

## 2018-03-26 NOTE — PROGRESS NOTES
Last 5 Patient Entered Readings                                      Current 30 Day Average: 114/82     Recent Readings 3/19/2018 3/12/2018 3/5/2018 2/27/2018 2/19/2018    SBP (mmHg) 117 117 113 110 111    DBP (mmHg) 80 89 79 79 68    Pulse 70 81 77 62 75          Hypertension Digital Medicine (HDMP) Health  Follow Up    LVM to follow up with Mrs. Fatuma Akins.    Per 30 day average, blood pressure is borderline controlled 114/82 mmHg. Encouraged adherence to low sodium diet and physical activity guidelines. Advised patient to call or message with questions or concerns.

## 2018-04-05 ENCOUNTER — HOSPITAL ENCOUNTER (OUTPATIENT)
Dept: RADIOLOGY | Facility: HOSPITAL | Age: 58
Discharge: HOME | End: 2018-04-05
Attending: FAMILY MEDICINE
Payer: COMMERCIAL

## 2018-04-05 ENCOUNTER — OFFICE VISIT (OUTPATIENT)
Dept: FAMILY MEDICINE | Facility: CLINIC | Age: 58
End: 2018-04-05
Payer: COMMERCIAL

## 2018-04-05 VITALS
HEART RATE: 87 BPM | TEMPERATURE: 98 F | BODY MASS INDEX: 33.17 KG/M2 | WEIGHT: 175.69 LBS | HEIGHT: 61 IN | DIASTOLIC BLOOD PRESSURE: 80 MMHG | SYSTOLIC BLOOD PRESSURE: 122 MMHG | RESPIRATION RATE: 18 BRPM

## 2018-04-05 VITALS — HEIGHT: 61 IN | WEIGHT: 175 LBS | BODY MASS INDEX: 33.04 KG/M2

## 2018-04-05 DIAGNOSIS — E03.9 ACQUIRED HYPOTHYROIDISM: ICD-10-CM

## 2018-04-05 DIAGNOSIS — E78.1 HYPERTRIGLYCERIDEMIA: ICD-10-CM

## 2018-04-05 DIAGNOSIS — I10 ESSENTIAL HYPERTENSION: ICD-10-CM

## 2018-04-05 DIAGNOSIS — Z00.00 PREVENTATIVE HEALTH CARE: Primary | ICD-10-CM

## 2018-04-05 DIAGNOSIS — Z12.31 ENCOUNTER FOR SCREENING MAMMOGRAM FOR MALIGNANT NEOPLASM OF BREAST: ICD-10-CM

## 2018-04-05 DIAGNOSIS — E66.9 OBESITY (BMI 30.0-34.9): ICD-10-CM

## 2018-04-05 PROCEDURE — 99396 PREV VISIT EST AGE 40-64: CPT | Mod: S$GLB,,, | Performed by: FAMILY MEDICINE

## 2018-04-05 PROCEDURE — 77067 SCR MAMMO BI INCL CAD: CPT | Mod: 26,,, | Performed by: RADIOLOGY

## 2018-04-05 PROCEDURE — 77063 BREAST TOMOSYNTHESIS BI: CPT | Mod: 26,,, | Performed by: RADIOLOGY

## 2018-04-05 PROCEDURE — 99999 PR PBB SHADOW E&M-EST. PATIENT-LVL III: CPT | Mod: PBBFAC,,, | Performed by: FAMILY MEDICINE

## 2018-04-05 PROCEDURE — 77067 SCR MAMMO BI INCL CAD: CPT | Mod: TC,PO

## 2018-04-05 NOTE — PROGRESS NOTES
CHIEF COMPLAINT: This is a 57 year old female here for preventive health exam.    SUBJECTIVE: The patient is doing well without complaints except for intermittent right hip and knee pain.  Her blood pressure is controlled on hydrochlorothiazide 12.5 mg daily.  She also takes levothyroxine for acquired hypothyroidism.  Patient is losing weight by counting calories.  She has lost 7 pounds in the last year.  She exercises 2-3 times a week.  Patient is concerned about waiting 10 years for colonoscopy.  Her father  of colon cancer.  Both her maternal and paternal aunts have had colon cancer.  Her sisters have colonoscopies every 5 years.     Eye exam 2017. Pap smear 2016, due again in 2019.  Colonoscopy 2013, due again in 2023. Mammogram 2017. Tdap 2009. Flu vaccine 2017.     ROS:  GENERAL: Patient denies fever, chills, night sweats. Patient denies weight gain. Patient denies anorexia, fatigue, weakness or swollen glands.  SKIN: Patient denies rash or hair loss.  HEENT: Patient denies sore throat, ear pain, hearing loss, nasal congestion, or runny nose. Patient denies visual disturbance, eye irritation or discharge.  LUNGS: Patient denies cough, wheeze or hemoptysis.  CARDIOVASCULAR: Patient denies chest pain, shortness of breath, palpitations, syncope or lower extremity edema.  GI: Patient denies abdominal pain, nausea, vomiting, diarrhea, constipation, blood in stool or melena.  GENITOURINARY: Patient denies pelvic pain, vaginal discharge, itch or odor. Patient denies irregular vaginal bleeding. Patient denies dysuria, frequency, hematuria, nocturia, urgency or incontinence.  BREASTS: Patient denies breast pain, mass or nipple discharge.  MUSCULOSKELETAL: Patient denies joint pain, swelling, redness or warmth.  NEUROLOGIC: Patient denies headache, vertigo, paresthesias, weakness in limb, dysarthria, dysphagia or abnormality of gait.  PSYCHIATRIC: Patient denies  anxiety, depression, or memory loss.     OBJECTIVE:   GENERAL: Well-developed well-nourished, obese, white female alert and oriented x3, in no acute distress. Memory, judgment and cognition without deficit.  SKIN: Clear without rash. Normal color and tone.  HEENT: Eyes: Clear conjunctivae. Pupils equal reactive to light and accommodation. Ears: Clear TMs. Clear canals. Nose: Without congestion. Pharynx: Without injection or exudates.  NECK: Supple, normal range of motion. No masses, lymphadenopathy or enlarged thyroid. No JVD. Carotids 2+ and equal. No bruits.  LUNGS: Clear to auscultation. Normal respiratory effort.  CARDIOVASCULAR: Regular rhythm, normal S1, S2 without murmur, gallop or rub.  BACK: No CVA or spinal tenderness.  BREASTS: No masses, tenderness or nipple discharge.  ABDOMEN: Normal appearance. Active bowel sounds. Soft, nontender without mass or organomegaly. No rebound or guarding.  EXTREMITIES: Without cyanosis, clubbing or edema. Distal pulses 2+ and equal. Normal range of motion in all extremities. No joint effusion, erythema or warmth.  NEUROLOGIC: Cranial nerves II through XII without deficit. Motor strength equal bilaterally. Sensation normal to touch. Deep tendon reflexes 2+ and equal. Gait without abnormality. No tremor. Negative cerebellar signs.  PELVIC: External: Without lesions or inflammation. Vaginal: Clear, atrophic changes. Cervix: Friable, atrophic. Nontender.  No Pap. Uterus: Normal size and shape. Adnexa: Non-tender, without masses. Rectovaginal: Confirms, heme-negative stool x2.     ASSESSMENT:  1. Preventative health care    2. Essential hypertension    3. Acquired hypothyroidism    4. Hypertriglyceridemia    5. Obesity (BMI 30.0-34.9)    6. Encounter for screening mammogram for malignant neoplasm of breast      PLAN:   1.  Weight reduction.  Exercise regularly.  2.  Age-appropriate counseling.  3.  Fasting lab.  4.  Mammogram.  5.  Refill medications as needed.

## 2018-04-18 RX ORDER — HYDROCHLOROTHIAZIDE 12.5 MG/1
12.5 CAPSULE ORAL DAILY
Qty: 90 CAPSULE | Refills: 3 | Status: SHIPPED | OUTPATIENT
Start: 2018-04-18 | End: 2019-04-09 | Stop reason: SDUPTHER

## 2018-04-18 RX ORDER — LEVOTHYROXINE SODIUM 50 UG/1
50 TABLET ORAL DAILY
Qty: 90 TABLET | Refills: 3 | Status: SHIPPED | OUTPATIENT
Start: 2018-04-18 | End: 2019-04-09 | Stop reason: SDUPTHER

## 2018-04-23 ENCOUNTER — PATIENT OUTREACH (OUTPATIENT)
Dept: OTHER | Facility: OTHER | Age: 58
End: 2018-04-23

## 2018-04-23 NOTE — PROGRESS NOTES
Last 5 Patient Entered Readings                                      Current 30 Day Average: 119/82     Recent Readings 4/20/2018 4/20/2018 4/20/2018 4/18/2018 4/18/2018    SBP (mmHg) 122 125 134 117 121    DBP (mmHg) 88 90 98 83 84    Pulse 65 62 63 67 66        Hypertension Medications             hydroCHLOROthiazide (MICROZIDE) 12.5 mg capsule Take 1 capsule (12.5 mg total) by mouth once daily.        Plan:   Called patient to follow up. Per newly released 2017 ACC/ AHA HTN guidelines  (goal of BP < 130/80), current 30-day average is slightly uncontrolled, remaining stable.  LVM, requested patient call back at her convenience.  Will continue to monitor. WCB in 1 month.     Patient called back. Patient states Withings cuff is giving her trouble, recommended patient take cuff to [facility].   Per newly released 2017 ACC/ AHA HTN guidelines (goal of BP < 130/80), current 30-day average is slightly uncontrolled, remaining stable.  Average today is likely skewed 2/2 falsely elevated readings taken by Withings cuff.   Patient denies having questions or concerns. Instructed patient to call if she has any questions or concerns, patient confirms understanding.   Will continue to monitor. WCB in 3 months, sooner if BP begins to trend up or down.

## 2018-05-02 ENCOUNTER — PATIENT OUTREACH (OUTPATIENT)
Dept: OTHER | Facility: OTHER | Age: 58
End: 2018-05-02

## 2018-05-02 NOTE — PROGRESS NOTES
Last 5 Patient Entered Readings                                      Current 30 Day Average: 115/80     Recent Readings 5/2/2018 5/2/2018 5/2/2018 4/30/2018 4/30/2018    SBP (mmHg) 100 105 114 115 130    DBP (mmHg) 75 82 77 81 83    Pulse 63 71 69 65 70          Digital Medicine: Health  Follow Up    Lifestyle Modifications:    1.Dietary Modifications (Sodium intake <2,000mg/day, food labels, dining out): Patient is doing well and does her best to maintain a low sodium diet. She does struggle when she goes out to eat but she will continue to ask for lower sodium options, for her meals to be prepared without any added salt or salted seasonings and I suggested that she cut her portions down by putting half the meal in a to go box right when her meal comes out and taking the rest home for another day. She will work on this and will continue to read food labels.     2.Physical Activity: Deferred    3.Medication Therapy: Patient has been compliant with the medication regimen.    4.Patient has the following medication side effects/concerns:   (Frequency/Alleviating factors/Precipitating factors, etc.)     Patient got a new cuff last week and switched from a Withings cuff to an [employer] cuff. She is pleased with the new cuff and feels like her readings are looking better.     Follow up with Mrs. Fatuma MERRILL Tashi completed. No further questions or concerns. Will continue follow up to achieve health goals.

## 2018-05-03 ENCOUNTER — PATIENT MESSAGE (OUTPATIENT)
Dept: FAMILY MEDICINE | Facility: CLINIC | Age: 58
End: 2018-05-03

## 2018-05-30 ENCOUNTER — PATIENT OUTREACH (OUTPATIENT)
Dept: OTHER | Facility: OTHER | Age: 58
End: 2018-05-30

## 2018-05-30 NOTE — PROGRESS NOTES
Last 5 Patient Entered Readings                                      Current 30 Day Average: 108/79     Recent Readings 5/30/2018 5/29/2018 5/28/2018 5/27/2018 5/26/2018    SBP (mmHg) 104 104 111 119 104    DBP (mmHg) 82 74 71 81 81    Pulse 67 74 64 67 55            Digital Medicine: Health  Follow Up    Lifestyle Modifications:    1.Dietary Modifications (Sodium intake <2,000mg/day, food labels, dining out): Patient continues working on her low sodium diet. She reads all food labels when they are available. Its harder to do when eating out so she tries to keep this in moderation. She will reach out to me with any other specific questions.     2.Physical Activity: Patient goes to the gym 5 times a week.     3.Medication Therapy: Patient has been compliant with the medication regimen.    4.Patient has the following medication side effects/concerns:   (Frequency/Alleviating factors/Precipitating factors, etc.)     Follow up with Marko Fatuma MERRILL Tashi completed. No further questions or concerns. Will continue follow up to achieve health goals.

## 2018-06-28 ENCOUNTER — PATIENT OUTREACH (OUTPATIENT)
Dept: OTHER | Facility: OTHER | Age: 58
End: 2018-06-28

## 2018-06-28 NOTE — PROGRESS NOTES
Last 5 Patient Entered Readings                                      Current 30 Day Average: 113/80     Recent Readings 6/28/2018 6/28/2018 6/25/2018 6/25/2018 6/20/2018    SBP (mmHg) 113 111 99 118 124    DBP (mmHg) 79 89 76 91 83    Pulse 82 69 62 67 59          Digital Medicine: Health  Follow Up    Lifestyle Modifications:    1.Dietary Modifications (Sodium intake <2,000mg/day, food labels, dining out): Patient continues working on her sodium intake but admits to eating out more recently because she is having repairs/renovations done in her home so she has not been cooking. She has also been eating [employer] since its quick and since she has access to her microwave. We discuss the sodium intake in foods while eating out along with the frozen dinners. Patient asked some good questions about what to do when eating out and I provided her with my feedback. We also discussed the Eat Fit resource along with having her fill out a sodium tracker handout so we can pinpoint any other issues in her diet and she was very open to this. I emailed her this information.     2.Physical Activity: Patient continues going to the gym just about everyday.     3.Medication Therapy: Patient has been compliant with the medication regimen.    4.Patient has the following medication side effects/concerns:   (Frequency/Alleviating factors/Precipitating factors, etc.)     Follow up with Ms. Fatuma Akins completed. No further questions or concerns. Will continue follow up to achieve health goals.

## 2018-07-16 ENCOUNTER — PATIENT OUTREACH (OUTPATIENT)
Dept: OTHER | Facility: OTHER | Age: 58
End: 2018-07-16

## 2018-07-16 NOTE — PROGRESS NOTES
Last 5 Patient Entered Readings                                      Current 30 Day Average: 113/81     Recent Readings 7/15/2018 7/13/2018 7/13/2018 7/12/2018 7/12/2018    SBP (mmHg) 95 117 118 118 116    DBP (mmHg) 74 91 90 83 91    Pulse 63 62 64 66 59        Hypertension Medications             hydroCHLOROthiazide (MICROZIDE) 12.5 mg capsule Take 1 capsule (12.5 mg total) by mouth once daily.        Plan:   Called patient to follow up. Per newly released 2017 ACC/ AHA HTN guidelines  (goal of BP < 130/80), current 30-day average is controlled.  DBP did trend last week into the 90s, will continue to monitor closely. If DBP continues to trend, will discuss increasing HCTZ to 25. Will follow up with readings/ chart in 6 weeks.   LVM, requested patient call back at her convenience if she has any questions or concerns, or if she is ever feeling hypotensive with lower readings.   Will continue to monitor. WCB in 6 months, sooner if BP begins to trend up or down.

## 2018-08-03 ENCOUNTER — PATIENT OUTREACH (OUTPATIENT)
Dept: OTHER | Facility: OTHER | Age: 58
End: 2018-08-03

## 2018-08-03 NOTE — PROGRESS NOTES
Last 5 Patient Entered Readings                                      Current 30 Day Average: 113/82     Recent Readings 8/3/2018 8/1/2018 8/1/2018 7/30/2018 7/25/2018    SBP (mmHg) 98 132 125 111 122    DBP (mmHg) 78 85 89 80 77    Pulse 62 63 73 71 64          Digital Medicine: Health  Follow Up    Lifestyle Modifications:    1.Dietary Modifications (Sodium intake <2,000mg/day, food labels, dining out): Patient is still not on a great regimen when it comes to maintaining a low sodium diet. She is still doing renovations to her home and is still working on her kitchen. She is trying to cook meals that will last her a few days in order to help cut back on eating out or eating frozen meals. She did look at the Eat Fit resource but she stated that there was not that many options in [place]. She will continue working on this.     2.Physical Activity: Deferred    3.Medication Therapy: Patient has been compliant with the medication regimen.    4.Patient has the following medication side effects/concerns:   (Frequency/Alleviating factors/Precipitating factors, etc.)     Patients sister is a nurse and she noticed that the patient was putting her BP cuff on incorrectly. She was not putting the artery pierce indictor on the right spot of her arm. She is not using it the right way (for the last few weeks).   She denies symptoms even when her BP is lower.   Will continue to work on diastolic number. This is continue to be a challenge until her kitchen is fully functional again.     Follow up with Marko Fatuma MERRILL Tashi completed. No further questions or concerns. Will continue follow up to achieve health goals.

## 2018-09-04 ENCOUNTER — PATIENT OUTREACH (OUTPATIENT)
Dept: OTHER | Facility: OTHER | Age: 58
End: 2018-09-04

## 2018-09-04 NOTE — PROGRESS NOTES
Last 5 Patient Entered Readings                                      Current 30 Day Average: 116/80     Recent Readings 8/27/2018 8/27/2018 8/26/2018 8/24/2018 8/22/2018    SBP (mmHg) 114 122 114 116 122    DBP (mmHg) 80 88 82 81 81    Pulse 63 61 61 69 62            Digital Medicine: Health  Follow Up    Left voicemail to follow up with . Fatuma Akins.  Current BP average 116/80 mmHg is at goal, <130/80.  Will continue to encourage a low sodium diet.  Want to see if her kitchen is finished being remodeled.

## 2018-09-05 ENCOUNTER — PATIENT MESSAGE (OUTPATIENT)
Dept: ADMINISTRATIVE | Facility: OTHER | Age: 58
End: 2018-09-05

## 2018-10-03 NOTE — PROGRESS NOTES
Last 5 Patient Entered Readings                                      Current 30 Day Average: 119/80     Recent Readings 10/3/2018 10/2/2018 10/1/2018 9/26/2018 9/26/2018    SBP (mmHg) 109 127 113 111 116    DBP (mmHg) 78 83 79 81 83    Pulse 70 64 65 72 66            Digital Medicine: Health  Follow Up    Left voicemail to follow up with Mrs. Fatuma Akins.  Current BP average 119/80 mmHg is at goal, <130/80.  Will continue to encourage a low sodium diet.  Want to see if her kitchen is finished being remodeled.

## 2018-10-24 ENCOUNTER — OFFICE VISIT (OUTPATIENT)
Dept: OPHTHALMOLOGY | Facility: CLINIC | Age: 58
End: 2018-10-24
Payer: COMMERCIAL

## 2018-10-24 DIAGNOSIS — H25.13 CATARACT, NUCLEAR SCLEROTIC SENILE, BILATERAL: ICD-10-CM

## 2018-10-24 DIAGNOSIS — H52.4 BILATERAL PRESBYOPIA: ICD-10-CM

## 2018-10-24 DIAGNOSIS — I10 ESSENTIAL HYPERTENSION: Primary | ICD-10-CM

## 2018-10-24 DIAGNOSIS — H52.13 MYOPIA, BILATERAL: ICD-10-CM

## 2018-10-24 PROCEDURE — 92014 COMPRE OPH EXAM EST PT 1/>: CPT | Mod: S$GLB,,, | Performed by: OPTOMETRIST

## 2018-10-24 PROCEDURE — 92015 DETERMINE REFRACTIVE STATE: CPT | Mod: S$GLB,,, | Performed by: OPTOMETRIST

## 2018-10-24 PROCEDURE — 99999 PR PBB SHADOW E&M-EST. PATIENT-LVL III: CPT | Mod: PBBFAC,,, | Performed by: OPTOMETRIST

## 2018-10-24 NOTE — PATIENT INSTRUCTIONS
Essential hypertension     Cataract, nuclear sclerotic senile, bilateral     Myopia, bilateral     Bilateral presbyopia        No HTN Retinopathy     Minimal cataracts OU, not surgical     Dispense Final Rx for glasses.  RTC 1 year  Discussed above and answered questions.

## 2018-10-24 NOTE — PROGRESS NOTES
HPI     No visual complaints.   Last eye exam 03/01/2017 [facility].  Last eye visit 10/17/2017 [facility].  Update glasses RX.    Last edited by Anabella Love on 10/24/2018  1:11 PM. (History)            Assessment /Plan     For exam results, see Encounter Report.    Essential hypertension    Cataract, nuclear sclerotic senile, bilateral    Myopia, bilateral    Bilateral presbyopia      No HTN Retinopathy    Minimal cataracts OU, not surgical    Dispense Final Rx for glasses.  RTC 1 year  Discussed above and answered questions.

## 2018-11-01 ENCOUNTER — IMMUNIZATION (OUTPATIENT)
Dept: FAMILY MEDICINE | Facility: CLINIC | Age: 58
End: 2018-11-01
Payer: COMMERCIAL

## 2018-11-01 PROCEDURE — 90471 IMMUNIZATION ADMIN: CPT | Mod: S$GLB,,, | Performed by: FAMILY MEDICINE

## 2018-11-01 PROCEDURE — 90686 IIV4 VACC NO PRSV 0.5 ML IM: CPT | Mod: S$GLB,,, | Performed by: FAMILY MEDICINE

## 2018-11-01 PROCEDURE — 99999 PR PBB SHADOW E&M-EST. PATIENT-LVL I: CPT | Mod: PBBFAC,,,

## 2018-11-05 NOTE — PROGRESS NOTES
Last 5 Patient Entered Readings                                      Current 30 Day Average: 115/81     Recent Readings 11/5/2018 10/31/2018 10/28/2018 10/26/2018 10/24/2018    SBP (mmHg) 114 110 120 103 112    DBP (mmHg) 80 79 82 82 82    Pulse 66 72 54 64 66            Digital Medicine: Health  Follow Up    Left voicemail to follow up with Ms. Fatuma Akins.  Current BP average 115/81 mmHg is not at goal, <130/80.

## 2018-12-10 NOTE — PROGRESS NOTES
Last 5 Patient Entered Readings                                      Current 30 Day Average: 114/82     Recent Readings 12/4/2018 12/3/2018 12/1/2018 11/30/2018 11/27/2018    SBP (mmHg) 109 108 116 103 117    DBP (mmHg) 82 85 78 73 84    Pulse 79 72 60 68 55            Digital Medicine: Health  Follow Up    Left voicemail to follow up with Ms. Fatuma Akins.  Current BP average 114/82 mmHg is not at goal, <130/80.

## 2018-12-17 ENCOUNTER — PATIENT OUTREACH (OUTPATIENT)
Dept: OTHER | Facility: OTHER | Age: 58
End: 2018-12-17

## 2018-12-17 NOTE — PROGRESS NOTES
Last 5 Patient Entered Readings                                      Current 30 Day Average: 111/80     Recent Readings 12/4/2018 12/3/2018 12/1/2018 11/30/2018 11/27/2018    SBP (mmHg) 109 108 116 103 117    DBP (mmHg) 82 85 78 73 84    Pulse 79 72 60 68 55        Hypertension Medications             hydroCHLOROthiazide (MICROZIDE) 12.5 mg capsule Take 1 capsule (12.5 mg total) by mouth once daily.        Plan:   Called patient to follow up, reviewed BP readings. Per 2017 ACC/ AHA HTN guidelines  (goal of BP < 130/80), current 30-day average is controlled.  LVM, requested patient call back at her convenience if she has any questions or concerns, and to continue to take at least weekly BP readings.   Will continue to monitor. WCB in 6 months, sooner if BP begins to trend up or down.

## 2019-01-14 ENCOUNTER — PATIENT MESSAGE (OUTPATIENT)
Dept: ADMINISTRATIVE | Facility: OTHER | Age: 59
End: 2019-01-14

## 2019-01-17 ENCOUNTER — OFFICE VISIT (OUTPATIENT)
Dept: DERMATOLOGY | Facility: CLINIC | Age: 59
End: 2019-01-17
Payer: COMMERCIAL

## 2019-01-17 DIAGNOSIS — D48.5 NEOPLASM OF UNCERTAIN BEHAVIOR OF SKIN: ICD-10-CM

## 2019-01-17 DIAGNOSIS — L90.5 SCAR CONDITIONS/SKIN FIBROSIS: Primary | ICD-10-CM

## 2019-01-17 DIAGNOSIS — L82.1 SEBORRHEIC KERATOSIS: ICD-10-CM

## 2019-01-17 DIAGNOSIS — D22.9 MULTIPLE NEVI: ICD-10-CM

## 2019-01-17 DIAGNOSIS — Z85.828 HISTORY OF SKIN CANCER: ICD-10-CM

## 2019-01-17 DIAGNOSIS — Z12.83 SCREENING, MALIGNANT NEOPLASM, SKIN: ICD-10-CM

## 2019-01-17 PROCEDURE — 88305 TISSUE SPECIMEN TO PATHOLOGY, DERMATOLOGY: ICD-10-PCS | Mod: 26,,, | Performed by: PATHOLOGY

## 2019-01-17 PROCEDURE — 99999 PR PBB SHADOW E&M-EST. PATIENT-LVL II: CPT | Mod: PBBFAC,,, | Performed by: DERMATOLOGY

## 2019-01-17 PROCEDURE — 11102 PR TANGENTIAL BIOPSY, SKIN, SINGLE LESION: ICD-10-PCS | Mod: S$GLB,,, | Performed by: DERMATOLOGY

## 2019-01-17 PROCEDURE — 99999 PR PBB SHADOW E&M-EST. PATIENT-LVL II: ICD-10-PCS | Mod: PBBFAC,,, | Performed by: DERMATOLOGY

## 2019-01-17 PROCEDURE — 11102 TANGNTL BX SKIN SINGLE LES: CPT | Mod: S$GLB,,, | Performed by: DERMATOLOGY

## 2019-01-17 PROCEDURE — 99203 OFFICE O/P NEW LOW 30 MIN: CPT | Mod: 25,S$GLB,, | Performed by: DERMATOLOGY

## 2019-01-17 PROCEDURE — 88305 TISSUE EXAM BY PATHOLOGIST: CPT | Performed by: PATHOLOGY

## 2019-01-17 PROCEDURE — 99203 PR OFFICE/OUTPT VISIT, NEW, LEVL III, 30-44 MIN: ICD-10-PCS | Mod: 25,S$GLB,, | Performed by: DERMATOLOGY

## 2019-01-17 NOTE — PROGRESS NOTES
Subjective:       Patient ID:  Fatuma Akins is a 58 y.o. female who presents for No chief complaint on file.    Hx of NMSC of the nose, here today to establish care and skin check. This is a high risk patient here to check for the development of new lesions. Denies bleeding, tender, growing, or concerning lesions.           Review of Systems   Constitutional: Negative for fever and chills.   Gastrointestinal: Negative for nausea and vomiting.   Skin: Positive for daily sunscreen use and activity-related sunscreen use. Negative for recent sunburn.   Hematologic/Lymphatic: Does not bruise/bleed easily.        Objective:    Physical Exam   Constitutional: She appears well-developed and well-nourished. No distress.   Neurological: She is alert and oriented to person, place, and time. She is not disoriented.   Psychiatric: She has a normal mood and affect.   Skin:   Areas Examined (abnormalities noted in diagram):   Scalp / Hair Palpated and Inspected  Head / Face Inspection Performed  Neck Inspection Performed  Chest / Axilla Inspection Performed  Abdomen Inspection Performed  Back Inspection Performed  RUE Inspected  LUE Inspection Performed  RLE Inspected  LLE Inspection Performed  Nails and Digits Inspection Performed                   Diagram Legend     Erythematous scaling macule/papule c/w actinic keratosis       Vascular papule c/w angioma      Pigmented verrucoid papule/plaque c/w seborrheic keratosis      Yellow umbilicated papule c/w sebaceous hyperplasia      Irregularly shaped tan macule c/w lentigo     1-2 mm smooth white papules consistent with Milia      Movable subcutaneous cyst with punctum c/w epidermal inclusion cyst      Subcutaneous movable cyst c/w pilar cyst      Firm pink to brown papule c/w dermatofibroma      Pedunculated fleshy papule(s) c/w skin tag(s)      Evenly pigmented macule c/w junctional nevus     Mildly variegated pigmented, slightly irregular-bordered macule c/w mildly atypical  nevus      Flesh colored to evenly pigmented papule c/w intradermal nevus       Pink pearly papule/plaque c/w basal cell carcinoma      Erythematous hyperkeratotic cursted plaque c/w SCC      Surgical scar with no sign of skin cancer recurrence      Open and closed comedones      Inflammatory papules and pustules      Verrucoid papule consistent consistent with wart     Erythematous eczematous patches and plaques     Dystrophic onycholytic nail with subungual debris c/w onychomycosis     Umbilicated papule    Erythematous-base heme-crusted tan verrucoid plaque consistent with inflamed seborrheic keratosis     Erythematous Silvery Scaling Plaque c/w Psoriasis     See annotation            Assessment / Plan:      Pathology Orders:     Normal Orders This Visit    Tissue Specimen To Pathology, Dermatology     Questions:    Directional Terms:  Other(comment)    Clinical information:  ISK vs. sebaceous hyperplasia vs. BCC    Specific Site:  right forehead        Scar conditions/skin fibrosis  Screening, malignant neoplasm, skin  History of skin cancer  Scar of the nose, hx of NMSC.  No evidence of recurrence on physical exam today.  Continue routine skin surveillance. Daily sunscreen advised.    Seborrheic keratosis  Reassurance given. Discussed diagnosis and that lesions are benign.  AAD handout given.     Multiple nevi  Reassurance given.  Discussed ABCDEF of melanoma and changes for patient to look for.  AAD Handout given. Discussed importance of daily use of sunscreen which is broad-spectrum and has a minimum SPF of 30.    Neoplasm of uncertain behavior of skin  -     Tissue Specimen To Pathology, Dermatology  -     Shave biopsy(-ies) done of 1 site(s).   Patient informed to call for results within 2 weeks if have not received notification via telephone call or [facility]           Follow-up for call for results.     PROCEDURE NOTE - SHAVE BIOPSY   Location: see above    After risk, benefits, and alternatives were  discussed with the patient, the patient agrees to the procedure by verbal informed consent.  The area(s) were cleansed with alcohol. 2 cc of lidocaine 1% with epinephrine was injected for local anesthesia into each lesion(s).  A sharp dermablade was used to remove part or all of the lesion(s).  The specimen(s) will be sent for tissue pathology.  Hemostasis was obtained with aluminum chloride and/or hyfrecation.  The area(s) were dressed with vaseline ointment and bandaged.  The patient tolerated the procedure well without adverse events.  Wound care instructions were given to the patient on the AVS.  The patient will be notified of pathology results once available. Results will also be available in Epic.

## 2019-01-17 NOTE — PATIENT INSTRUCTIONS
Shave Biopsy Wound Care    Your doctor has performed a shave biopsy today.  A band aid and vaseline ointment has been placed over the site.  This should remain in place for 24 hours.  It is recommended that you keep the area dry for the first 24 hours.  After 24 hours, you may remove the band aid and wash the area with warm soap and water and apply Vaseline jelly.  Many patients prefer to use Neosporin or Bacitracin ointment.  This is acceptable; however, know that you can develop an allergy to this medication even if you have used it safely for years.  It is important to keep the area moist.  Letting it dry out and get air slows healing time, and will worsen the scar.  Band aid is optional after first 24 hours.      If you notice increasing redness, tenderness, pain, or yellow drainage at the biopsy site, please notify your doctor.  These are signs of an infection.    If your biopsy site is bleeding, apply firm pressure for 15 minutes straight.  Repeat for another 15 minutes, if it is still bleeding.   If the surgical site continues to bleed, then please contact your doctor.      [facility] - [facility]   DERMATOLOGY  [address]   [place] [address]   Dept: [phone]   Dept Fax: [phone]

## 2019-01-23 NOTE — PROGRESS NOTES
Last 5 Patient Entered Readings                                      Current 30 Day Average: 109/80     Recent Readings 1/23/2019 1/21/2019 1/18/2019 1/17/2019 1/16/2019    SBP (mmHg) 107 119 113 115 106    DBP (mmHg) 81 80 82 84 79    Pulse 72 79 61 64 68          Digital Medicine: Health  Follow Up    Lifestyle Modifications:    1.Dietary Modifications (Sodium intake <2,000mg/day, food labels, dining out): Patient admits to dietary indiscretions and trying to get back on track since the holidays. She also gained 5lbs so she is working on loosing that weight again. She will reach out with any specific questions.     2.Physical Activity: Deferred    3.Medication Therapy: Patient has been compliant with the medication regimen. Patient is doing well on her current regimen. She denies symptoms/side effects.     4.Patient has the following medication side effects/concerns:   (Frequency/Alleviating factors/Precipitating factors, etc.)     Follow up with Mrs. Fatuma MERRILL Tashi completed. No further questions or concerns. Will continue to follow up to achieve health goals.

## 2019-02-21 ENCOUNTER — PATIENT OUTREACH (OUTPATIENT)
Dept: OTHER | Facility: OTHER | Age: 59
End: 2019-02-21

## 2019-02-21 NOTE — PROGRESS NOTES
Last 5 Patient Entered Readings                                      Current 30 Day Average: 115/81     Recent Readings 2/20/2019 2/18/2019 2/15/2019 2/13/2019 2/11/2019    SBP (mmHg) 116 103 117 113 119    DBP (mmHg) 77 80 77 77 84    Pulse 61 70 61 60 67          Digital Medicine: Health  Follow Up    Lifestyle Modifications:    1.Dietary Modifications (Sodium intake <2,000mg/day, food labels, dining out): Patient denies any changes in her diet that would cause an increase in her sodium intake. It appears that she does eat out something so this could be contributing to her higher diastolic numbers. She stated that she will continue to work on this and she will reach out with any specific questions or concerns.     2.Physical Activity: Patient continues going to the gym 5 days a week and does yoga, sayra chi, weight training class and walking.     3.Medication Therapy: Patient has been compliant with the medication regimen. Patient is doing well on her current regimen. She denies symptoms/side effects.     4.Patient has the following medication side effects/concerns:   (Frequency/Alleviating factors/Precipitating factors, etc.)     Follow up with Mrs. Fatuma MERRILL Tashi completed. No further questions or concerns. Will continue to follow up to achieve health goals.

## 2019-03-03 ENCOUNTER — PATIENT MESSAGE (OUTPATIENT)
Dept: ADMINISTRATIVE | Facility: OTHER | Age: 59
End: 2019-03-03

## 2019-03-26 ENCOUNTER — PATIENT OUTREACH (OUTPATIENT)
Dept: ADMINISTRATIVE | Facility: HOSPITAL | Age: 59
End: 2019-03-26

## 2019-03-29 ENCOUNTER — PATIENT MESSAGE (OUTPATIENT)
Dept: ADMINISTRATIVE | Facility: HOSPITAL | Age: 59
End: 2019-03-29

## 2019-04-09 ENCOUNTER — LAB VISIT (OUTPATIENT)
Dept: LAB | Facility: HOSPITAL | Age: 59
End: 2019-04-09
Attending: FAMILY MEDICINE
Payer: COMMERCIAL

## 2019-04-09 ENCOUNTER — OFFICE VISIT (OUTPATIENT)
Dept: FAMILY MEDICINE | Facility: CLINIC | Age: 59
End: 2019-04-09
Payer: COMMERCIAL

## 2019-04-09 VITALS
SYSTOLIC BLOOD PRESSURE: 120 MMHG | WEIGHT: 176.81 LBS | HEART RATE: 83 BPM | OXYGEN SATURATION: 98 % | HEIGHT: 60 IN | DIASTOLIC BLOOD PRESSURE: 82 MMHG | BODY MASS INDEX: 34.71 KG/M2 | TEMPERATURE: 98 F

## 2019-04-09 DIAGNOSIS — I10 ESSENTIAL HYPERTENSION: ICD-10-CM

## 2019-04-09 DIAGNOSIS — Z23 NEED FOR TETANUS BOOSTER: ICD-10-CM

## 2019-04-09 DIAGNOSIS — E66.9 OBESITY (BMI 30.0-34.9): ICD-10-CM

## 2019-04-09 DIAGNOSIS — Z00.00 PREVENTATIVE HEALTH CARE: Primary | ICD-10-CM

## 2019-04-09 DIAGNOSIS — E78.1 HYPERTRIGLYCERIDEMIA: ICD-10-CM

## 2019-04-09 DIAGNOSIS — Z00.00 PREVENTATIVE HEALTH CARE: ICD-10-CM

## 2019-04-09 DIAGNOSIS — Z12.4 CERVICAL CANCER SCREENING: ICD-10-CM

## 2019-04-09 DIAGNOSIS — Z12.11 COLON CANCER SCREENING: ICD-10-CM

## 2019-04-09 DIAGNOSIS — E03.9 ACQUIRED HYPOTHYROIDISM: ICD-10-CM

## 2019-04-09 DIAGNOSIS — Z12.31 ENCOUNTER FOR SCREENING MAMMOGRAM FOR MALIGNANT NEOPLASM OF BREAST: ICD-10-CM

## 2019-04-09 LAB
ALBUMIN SERPL BCP-MCNC: 4.4 G/DL (ref 3.5–5.2)
ALP SERPL-CCNC: 70 U/L (ref 55–135)
ALT SERPL W/O P-5'-P-CCNC: 16 U/L (ref 10–44)
ANION GAP SERPL CALC-SCNC: 10 MMOL/L (ref 8–16)
AST SERPL-CCNC: 21 U/L (ref 10–40)
BASOPHILS # BLD AUTO: 0.08 K/UL (ref 0–0.2)
BASOPHILS NFR BLD: 0.8 % (ref 0–1.9)
BILIRUB SERPL-MCNC: 0.6 MG/DL (ref 0.1–1)
BUN SERPL-MCNC: 13 MG/DL (ref 6–20)
CALCIUM SERPL-MCNC: 10.4 MG/DL (ref 8.7–10.5)
CHLORIDE SERPL-SCNC: 104 MMOL/L (ref 95–110)
CHOLEST SERPL-MCNC: 178 MG/DL (ref 120–199)
CHOLEST/HDLC SERPL: 3.2 {RATIO} (ref 2–5)
CO2 SERPL-SCNC: 26 MMOL/L (ref 23–29)
CREAT SERPL-MCNC: 0.9 MG/DL (ref 0.5–1.4)
DIFFERENTIAL METHOD: ABNORMAL
EOSINOPHIL # BLD AUTO: 0.4 K/UL (ref 0–0.5)
EOSINOPHIL NFR BLD: 4 % (ref 0–8)
ERYTHROCYTE [DISTWIDTH] IN BLOOD BY AUTOMATED COUNT: 13.6 % (ref 11.5–14.5)
EST. GFR  (AFRICAN AMERICAN): >60 ML/MIN/1.73 M^2
EST. GFR  (NON AFRICAN AMERICAN): >60 ML/MIN/1.73 M^2
GLUCOSE SERPL-MCNC: 96 MG/DL (ref 70–110)
HCT VFR BLD AUTO: 44.4 % (ref 37–48.5)
HDLC SERPL-MCNC: 56 MG/DL (ref 40–75)
HDLC SERPL: 31.5 % (ref 20–50)
HGB BLD-MCNC: 14 G/DL (ref 12–16)
IMM GRANULOCYTES # BLD AUTO: 0.03 K/UL (ref 0–0.04)
IMM GRANULOCYTES NFR BLD AUTO: 0.3 % (ref 0–0.5)
LDLC SERPL CALC-MCNC: 99.4 MG/DL (ref 63–159)
LYMPHOCYTES # BLD AUTO: 3.3 K/UL (ref 1–4.8)
LYMPHOCYTES NFR BLD: 34.4 % (ref 18–48)
MCH RBC QN AUTO: 27.3 PG (ref 27–31)
MCHC RBC AUTO-ENTMCNC: 31.5 G/DL (ref 32–36)
MCV RBC AUTO: 87 FL (ref 82–98)
MONOCYTES # BLD AUTO: 1 K/UL (ref 0.3–1)
MONOCYTES NFR BLD: 10 % (ref 4–15)
NEUTROPHILS # BLD AUTO: 4.9 K/UL (ref 1.8–7.7)
NEUTROPHILS NFR BLD: 50.5 % (ref 38–73)
NONHDLC SERPL-MCNC: 122 MG/DL
NRBC BLD-RTO: 0 /100 WBC
PLATELET # BLD AUTO: 435 K/UL (ref 150–350)
PMV BLD AUTO: 10.2 FL (ref 9.2–12.9)
POTASSIUM SERPL-SCNC: 4.3 MMOL/L (ref 3.5–5.1)
PROT SERPL-MCNC: 7.4 G/DL (ref 6–8.4)
RBC # BLD AUTO: 5.12 M/UL (ref 4–5.4)
SODIUM SERPL-SCNC: 140 MMOL/L (ref 136–145)
TRIGL SERPL-MCNC: 113 MG/DL (ref 30–150)
TSH SERPL DL<=0.005 MIU/L-ACNC: 2.36 UIU/ML (ref 0.4–4)
WBC # BLD AUTO: 9.62 K/UL (ref 3.9–12.7)

## 2019-04-09 PROCEDURE — 80053 COMPREHEN METABOLIC PANEL: CPT

## 2019-04-09 PROCEDURE — 99999 PR PBB SHADOW E&M-EST. PATIENT-LVL IV: ICD-10-PCS | Mod: PBBFAC,,, | Performed by: FAMILY MEDICINE

## 2019-04-09 PROCEDURE — 3079F DIAST BP 80-89 MM HG: CPT | Mod: CPTII,S$GLB,, | Performed by: FAMILY MEDICINE

## 2019-04-09 PROCEDURE — 3074F PR MOST RECENT SYSTOLIC BLOOD PRESSURE < 130 MM HG: ICD-10-PCS | Mod: CPTII,S$GLB,, | Performed by: FAMILY MEDICINE

## 2019-04-09 PROCEDURE — 36415 COLL VENOUS BLD VENIPUNCTURE: CPT | Mod: PO

## 2019-04-09 PROCEDURE — 84443 ASSAY THYROID STIM HORMONE: CPT

## 2019-04-09 PROCEDURE — 99396 PR PREVENTIVE VISIT,EST,40-64: ICD-10-PCS | Mod: 25,S$GLB,, | Performed by: FAMILY MEDICINE

## 2019-04-09 PROCEDURE — 3079F PR MOST RECENT DIASTOLIC BLOOD PRESSURE 80-89 MM HG: ICD-10-PCS | Mod: CPTII,S$GLB,, | Performed by: FAMILY MEDICINE

## 2019-04-09 PROCEDURE — 80061 LIPID PANEL: CPT

## 2019-04-09 PROCEDURE — 99396 PREV VISIT EST AGE 40-64: CPT | Mod: 25,S$GLB,, | Performed by: FAMILY MEDICINE

## 2019-04-09 PROCEDURE — 99999 PR PBB SHADOW E&M-EST. PATIENT-LVL IV: CPT | Mod: PBBFAC,,, | Performed by: FAMILY MEDICINE

## 2019-04-09 PROCEDURE — 85025 COMPLETE CBC W/AUTO DIFF WBC: CPT

## 2019-04-09 PROCEDURE — 3074F SYST BP LT 130 MM HG: CPT | Mod: CPTII,S$GLB,, | Performed by: FAMILY MEDICINE

## 2019-04-09 PROCEDURE — 87624 HPV HI-RISK TYP POOLED RSLT: CPT

## 2019-04-09 PROCEDURE — 88175 CYTOPATH C/V AUTO FLUID REDO: CPT

## 2019-04-09 RX ORDER — HYDROCHLOROTHIAZIDE 12.5 MG/1
12.5 CAPSULE ORAL DAILY
Qty: 90 CAPSULE | Refills: 3 | Status: SHIPPED | OUTPATIENT
Start: 2019-04-09 | End: 2020-04-06 | Stop reason: SDUPTHER

## 2019-04-09 RX ORDER — LEVOTHYROXINE SODIUM 50 UG/1
50 TABLET ORAL DAILY
Qty: 90 TABLET | Refills: 3 | Status: SHIPPED | OUTPATIENT
Start: 2019-04-09 | End: 2020-04-06 | Stop reason: SDUPTHER

## 2019-04-09 NOTE — PROGRESS NOTES
CHIEF COMPLAINT:  This is a 58-year-old female here for preventive health exam.    SUBJECTIVE: The patient is doing well without complaints.  Her blood pressure is controlled on hydrochlorothiazide 12.5 mg daily.  She also takes levothyroxine for acquired hypothyroidism.  Patient has a strong family history of colon cancer and requests colonoscopy.     Eye exam October 2018 . Pap smear March 2016, due again in March 2019.  Colonoscopy August 2013, due again in August 2023. Mammogram April 2017. Tdap August 2009. Flu vaccine November 2018.     ROS:  GENERAL: Patient denies fever, chills, night sweats. Patient denies weight gain. Patient denies anorexia, fatigue, weakness or swollen glands.  SKIN: Patient denies rash or hair loss.  HEENT: Patient denies sore throat, ear pain, hearing loss, nasal congestion, or runny nose. Patient denies visual disturbance, eye irritation or discharge.  LUNGS: Patient denies cough, wheeze or hemoptysis.  CARDIOVASCULAR: Patient denies chest pain, shortness of breath, palpitations, syncope or lower extremity edema.  GI: Patient denies abdominal pain, nausea, vomiting, diarrhea, constipation, blood in stool or melena.  GENITOURINARY: Patient denies pelvic pain, vaginal discharge, itch or odor. Patient denies irregular vaginal bleeding. Patient denies dysuria, frequency, hematuria, nocturia, urgency or incontinence.  BREASTS: Patient denies breast pain, mass or nipple discharge.  MUSCULOSKELETAL: Patient denies joint pain, swelling, redness or warmth.  NEUROLOGIC: Patient denies headache, vertigo, paresthesias, weakness in limb, dysarthria, dysphagia or abnormality of gait.  PSYCHIATRIC: Patient denies anxiety, depression, or memory loss.     OBJECTIVE:   GENERAL: Well-developed well-nourished, obese, white female alert and oriented x3, in no acute distress. Memory, judgment and cognition without deficit.  SKIN: Clear without rash. Normal color and tone.  HEENT: Eyes: Clear conjunctivae.  Pupils equal reactive to light and accommodation. Ears: Clear TMs. Clear canals. Nose: Without congestion. Pharynx: Without injection or exudates.  NECK: Supple, normal range of motion. No masses, lymphadenopathy or enlarged thyroid. No JVD. Carotids 2+ and equal. No bruits.  LUNGS: Clear to auscultation. Normal respiratory effort.  CARDIOVASCULAR: Regular rhythm, normal S1, S2 without murmur, gallop or rub.  BACK: No CVA or spinal tenderness.  BREASTS: No masses, tenderness or nipple discharge.  ABDOMEN: Normal appearance. Active bowel sounds. Soft, nontender without mass or organomegaly. No rebound or guarding.  EXTREMITIES: Without cyanosis, clubbing or edema. Distal pulses 2+ and equal. Normal range of motion in all extremities. No joint effusion, erythema or warmth.  NEUROLOGIC: Cranial nerves II through XII without deficit. Motor strength equal bilaterally. Sensation normal to touch. Deep tendon reflexes 2+ and equal. Gait without abnormality. No tremor. Negative cerebellar signs.  PELVIC: External: Without lesions or inflammation. Vaginal: Clear, atrophic changes. Cervix: Friable, atrophic. Nontender. Pap x2. Uterus: Normal size and shape. Adnexa: Non-tender, without masses. Rectovaginal: Confirms, heme-negative stool x2.     ASSESSMENT:  1. Preventative health care    2. Essential hypertension    3. Acquired hypothyroidism    4. Hypertriglyceridemia    5. Obesity (BMI 30.0-34.9)    6. Encounter for screening mammogram for malignant neoplasm of breast    7. Colon cancer screening    8. Cervical cancer screening    9. Need for tetanus booster      PLAN:   1.  Weight reduction.  Exercise regularly.  2.  Age-appropriate counseling.  3.  Fasting lab.  4.  Mammogram.  5.  Colonoscopy.  6.  Refill medications.  7.  TD booster.  8.  Follow-up annually.    This note is generated with speech recognition software and is subject to transcription error and sound alike phrases that may be missed by proofreading.

## 2019-04-11 ENCOUNTER — HOSPITAL ENCOUNTER (OUTPATIENT)
Dept: RADIOLOGY | Facility: HOSPITAL | Age: 59
Discharge: HOME | End: 2019-04-11
Attending: FAMILY MEDICINE
Payer: COMMERCIAL

## 2019-04-11 DIAGNOSIS — Z12.31 ENCOUNTER FOR SCREENING MAMMOGRAM FOR MALIGNANT NEOPLASM OF BREAST: ICD-10-CM

## 2019-04-11 PROCEDURE — 77067 MAMMO DIGITAL SCREENING BILAT WITH TOMOSYNTHESIS_CAD: ICD-10-PCS | Mod: 26,,, | Performed by: RADIOLOGY

## 2019-04-11 PROCEDURE — 77063 MAMMO DIGITAL SCREENING BILAT WITH TOMOSYNTHESIS_CAD: ICD-10-PCS | Mod: 26,,, | Performed by: RADIOLOGY

## 2019-04-11 PROCEDURE — 77067 SCR MAMMO BI INCL CAD: CPT | Mod: 26,,, | Performed by: RADIOLOGY

## 2019-04-11 PROCEDURE — 77063 BREAST TOMOSYNTHESIS BI: CPT | Mod: 26,,, | Performed by: RADIOLOGY

## 2019-04-11 PROCEDURE — 77067 SCR MAMMO BI INCL CAD: CPT | Mod: TC

## 2019-04-12 ENCOUNTER — PATIENT MESSAGE (OUTPATIENT)
Dept: FAMILY MEDICINE | Facility: CLINIC | Age: 59
End: 2019-04-12

## 2019-04-12 LAB
HPV HR 12 DNA CVX QL NAA+PROBE: NEGATIVE
HPV16 AG SPEC QL: NEGATIVE
HPV18 DNA SPEC QL NAA+PROBE: NEGATIVE

## 2019-04-24 ENCOUNTER — PATIENT OUTREACH (OUTPATIENT)
Dept: OTHER | Facility: OTHER | Age: 59
End: 2019-04-24

## 2019-04-24 NOTE — PROGRESS NOTES
Last 5 Patient Entered Readings                                      Current 30 Day Average: 113/78     Recent Readings 4/24/2019 4/22/2019 4/17/2019 4/15/2019 4/12/2019    SBP (mmHg) 100 122 103 117 101    DBP (mmHg) 79 81 69 82 75    Pulse 73 61 65 70 64            Digital Medicine: Health  Follow Up    Left voicemail to follow up with Marko Fatuma MERRILL Tashi.  Current BP average 113/78 mmHg is at goal, <130/80

## 2019-05-22 ENCOUNTER — PATIENT MESSAGE (OUTPATIENT)
Dept: OTHER | Facility: OTHER | Age: 59
End: 2019-05-22

## 2019-05-22 NOTE — PROGRESS NOTES
"Last 5 Patient Entered Readings                                      Current 30 Day Average: 108/80     Recent Readings 5/22/2019 5/20/2019 5/17/2019 5/17/2019 5/15/2019    SBP (mmHg) 115 110 99 199 106    DBP (mmHg) 84 82 79 79 79    Pulse 64 69 70 70 71          Patient messaged me the following:  "I got your message, I'll be on vacation through June 16.  I am doing fine and very excited that my blood pressure has gone down on average.  I haven't changed anything significantly but am trying to keep losing weight.  It is a slow and steady fight.  I hope I can maintain through vacation.  We will be doing a lot of walking but also trying out the local food and drink.  Have a nice vacation.  Thanks for all of your help!! "    Provided feedback and will continue to follow up.   "

## 2019-05-22 NOTE — PROGRESS NOTES
Last 5 Patient Entered Readings                                      Current 30 Day Average: 108/80     Recent Readings 5/22/2019 5/20/2019 5/17/2019 5/17/2019 5/15/2019    SBP (mmHg) 115 110 99 199 106    DBP (mmHg) 84 82 79 79 79    Pulse 64 69 70 70 71            Digital Medicine: Health  Follow Up    Left voicemail to follow up with Mrs. Fatuma Akins.  Current BP average 108/80 mmHg is at goal, <130/80.

## 2019-05-29 ENCOUNTER — PATIENT OUTREACH (OUTPATIENT)
Dept: OTHER | Facility: OTHER | Age: 59
End: 2019-05-29

## 2019-05-29 NOTE — PROGRESS NOTES
Last 5 Patient Entered Readings                                      Current 30 Day Average: 107/80     Recent Readings 5/28/2019 5/22/2019 5/20/2019 5/17/2019 5/17/2019    SBP (mmHg) 99 115 110 99 199    DBP (mmHg) 70 84 82 79 79    Pulse 72 64 69 70 70        Hypertension Medications             hydroCHLOROthiazide (MICROZIDE) 12.5 mg capsule Take 1 capsule (12.5 mg total) by mouth once daily.        Called patient to follow up, reviewed BP readings. Per 2017 ACC/ AHA HTN guidelines  (goal of BP < 130/80), current 30-day average is well controlled.  LVM, requested patient call back at her convenience if she has any questions or concerns.  Will continue to monitor. WCB in 6 months, sooner if BP begins to trend up or down.

## 2019-06-20 NOTE — PRE ADMISSION SCREENING
Endoscopy Scheduling Questionnaire:    1. Have you been admitted overnight to the hospital in the past 3 months? no  2. Do you get chest pain and SOB while walking up a flight of stairs? no  3. Have you had a cardiac stent placed in the past 12 months? no  4. Have you had a stroke or heart attack in the past 6 months? no  5. Have you had any chest pain in the past 3 months? no      If so, have you been evaluated by your PCP or Cardiologist? no  6. Do you take medication for weight loss? no  7. Have you been diagnosed with Diverticulitis within the past 3 months? no  8. Are you having any GI symptoms that you feel need to be evaluated prior to your procedure? no  9. Are you on dialysis? no  10. Are you diabetic? no  11. Do you have any other health issues that you feel might limit your ability to safely have the procedure and/or sedation? no  12. Is the patient over 79 yo? no        If so, has the patient been seen by their PCP or GI in the last 3 months? N/A       -I have reviewed the last colonoscopy for recommendations regarding surveillance and bowel prep  Yes  -I have reviewed the patient's medications and allergies. She is not on high risk medication, A clearance request NA  -I have verified the pharmacy information. The prep being used is Suprep. The patient's prep instructions were sent via 8/1/19..    Date Endoscopy Scheduled: Date: 8/1/19

## 2019-07-15 ENCOUNTER — OFFICE VISIT (OUTPATIENT)
Dept: FAMILY MEDICINE | Facility: CLINIC | Age: 59
End: 2019-07-15
Payer: COMMERCIAL

## 2019-07-15 VITALS
WEIGHT: 177.69 LBS | OXYGEN SATURATION: 98 % | HEIGHT: 60 IN | DIASTOLIC BLOOD PRESSURE: 77 MMHG | BODY MASS INDEX: 34.89 KG/M2 | HEART RATE: 91 BPM | TEMPERATURE: 98 F | SYSTOLIC BLOOD PRESSURE: 108 MMHG

## 2019-07-15 DIAGNOSIS — L03.818 CELLULITIS OF OTHER SPECIFIED SITE: ICD-10-CM

## 2019-07-15 DIAGNOSIS — T63.461A WASP STING, ACCIDENTAL OR UNINTENTIONAL, INITIAL ENCOUNTER: Primary | ICD-10-CM

## 2019-07-15 PROCEDURE — 3074F PR MOST RECENT SYSTOLIC BLOOD PRESSURE < 130 MM HG: ICD-10-PCS | Mod: CPTII,S$GLB,, | Performed by: FAMILY MEDICINE

## 2019-07-15 PROCEDURE — 99999 PR PBB SHADOW E&M-EST. PATIENT-LVL III: CPT | Mod: PBBFAC,,, | Performed by: FAMILY MEDICINE

## 2019-07-15 PROCEDURE — 99999 PR PBB SHADOW E&M-EST. PATIENT-LVL III: ICD-10-PCS | Mod: PBBFAC,,, | Performed by: FAMILY MEDICINE

## 2019-07-15 PROCEDURE — 3008F PR BODY MASS INDEX (BMI) DOCUMENTED: ICD-10-PCS | Mod: CPTII,S$GLB,, | Performed by: FAMILY MEDICINE

## 2019-07-15 PROCEDURE — 3078F DIAST BP <80 MM HG: CPT | Mod: CPTII,S$GLB,, | Performed by: FAMILY MEDICINE

## 2019-07-15 PROCEDURE — 99213 OFFICE O/P EST LOW 20 MIN: CPT | Mod: S$GLB,,, | Performed by: FAMILY MEDICINE

## 2019-07-15 PROCEDURE — 3074F SYST BP LT 130 MM HG: CPT | Mod: CPTII,S$GLB,, | Performed by: FAMILY MEDICINE

## 2019-07-15 PROCEDURE — 3078F PR MOST RECENT DIASTOLIC BLOOD PRESSURE < 80 MM HG: ICD-10-PCS | Mod: CPTII,S$GLB,, | Performed by: FAMILY MEDICINE

## 2019-07-15 PROCEDURE — 3008F BODY MASS INDEX DOCD: CPT | Mod: CPTII,S$GLB,, | Performed by: FAMILY MEDICINE

## 2019-07-15 PROCEDURE — 99213 PR OFFICE/OUTPT VISIT, EST, LEVL III, 20-29 MIN: ICD-10-PCS | Mod: S$GLB,,, | Performed by: FAMILY MEDICINE

## 2019-07-15 RX ORDER — CEPHALEXIN 500 MG/1
500 CAPSULE ORAL EVERY 12 HOURS
Qty: 10 CAPSULE | Refills: 0 | Status: SHIPPED | OUTPATIENT
Start: 2019-07-15 | End: 2019-07-20

## 2019-07-15 RX ORDER — MUPIROCIN 20 MG/G
OINTMENT TOPICAL 3 TIMES DAILY
Qty: 15 G | Refills: 0 | Status: SHIPPED | OUTPATIENT
Start: 2019-07-15 | End: 2020-06-08

## 2019-07-15 NOTE — PROGRESS NOTES
Subjective:      Patient ID: Fatuma Akins is a 59 y.o. female.    Chief Complaint: Insect Bite    HPI    Bee sting on right hand 4 days ago   Initial swelling and erythema of entire hand up to mid distal arm   No swelling of face and no difficulty breathing  Swelling has improved and only localized in area of bite  Now notes ring of erythema around bite that wasn't initially there  No pain in hand   No itching although it was itchy yesterday  No fevers, chills, night sweats  Has had swelling reaction in the past to insect bites/wasp stings but has never had erythema like this   Notes she has a vacation scheduled for Wednesday and wanted to get area checked prior to leaving     Past Medical History:   Diagnosis Date    Atrophic vaginitis     Hypertension     Hypertriglyceridemia     Hypothyroidism     Obesity        Past Surgical History:   Procedure Laterality Date    BASAL CELL CARCINOMA EXCISION      Nose    COLONOSCOPY N/A 8/16/2013    Performed by Kt Chapa MD at [facility] ENDO       Family History   Problem Relation Age of Onset    Colon cancer Father 74    Retinal detachment Mother     Hypertension Mother     Diabetes Mother     Cataracts Mother     Hearing loss Mother     Colon cancer Paternal Aunt     Hypertension Maternal Aunt     Colon cancer Maternal Aunt     Kidney cancer Maternal Aunt     Breast cancer Maternal Aunt     Heart disease Maternal Grandmother     Heart disease Maternal Grandfather     Heart disease Paternal Grandfather     Diabetes Other         Maternal great-grandfather    Hypertension Sister     Thyroid disease Sister         Goiter       Social History     Socioeconomic History    Marital status: Single     Spouse name: Not on file    Number of children: Not on file    Years of education: Not on file    Highest education level: Not on file   Occupational History    Not on file   Social Needs    Financial resource strain: Not hard at all    Food  insecurity:     Worry: Never true     Inability: Never true    Transportation needs:     Medical: No     Non-medical: No   Tobacco Use    Smoking status: Never Smoker    Smokeless tobacco: Never Used   Substance and Sexual Activity    Alcohol use: Yes     Alcohol/week: 1.8 oz     Types: 3 Glasses of wine per week     Frequency: 2-3 times a week     Drinks per session: 1 or 2     Binge frequency: Never    Drug use: No    Sexual activity: Not on file   Lifestyle    Physical activity:     Days per week: 5 days     Minutes per session: 60 min    Stress: Not at all   Relationships    Social connections:     Talks on phone: More than three times a week     Gets together: More than three times a week     Attends Taoist service: Not on file     Active member of club or organization: Yes     Attends meetings of clubs or organizations: 1 to 4 times per year     Relationship status: Never    Other Topics Concern    Not on file   Social History Narrative    The patient is single, has no children, and lives alone. She is retired as a banking examiner.                Health Maintenance Topics with due status: Not Due       Topic Last Completion Date    Colonoscopy 08/16/2013    Influenza Vaccine 11/01/2018    TETANUS VACCINE 04/09/2019    Pap Smear 04/09/2019    Lipid Panel 04/09/2019    Mammogram 04/11/2019       Medication List with Changes/Refills   New Medications    CEPHALEXIN (KEFLEX) 500 MG CAPSULE    Take 1 capsule (500 mg total) by mouth every 12 (twelve) hours. for 5 days    MUPIROCIN (BACTROBAN) 2 % OINTMENT    Apply topically 3 (three) times daily.   Current Medications    ASCORBIC ACID/MULTIVIT-MIN (EMERGEN-C IMMUNE PLUS ORAL)        CALCIUM-VITAMIN D3 500 MG(1,250MG) -200 UNIT PER TABLET    Take 1 tablet by mouth 2 (two) times daily with meals.    COQ10, UBIQUINOL, 200 MG CAP    Take by mouth.    FISH OIL-OMEGA-3 FATTY ACIDS 300-1,000 MG CAPSULE    Take 2 g by mouth once daily.     HYDROCHLOROTHIAZIDE (MICROZIDE) 12.5 MG CAPSULE    Take 1 capsule (12.5 mg total) by mouth once daily.    LEVOTHYROXINE (SYNTHROID) 50 MCG TABLET    Take 1 tablet (50 mcg total) by mouth once daily.    MULTIVITAMIN WITH MINERALS TABLET    Take 1 tablet by mouth once daily.    VIT A/C/E AC/ZNOX/CUPRIC OXIDE (EYE VITAMIN AND MINERALS ORAL)    Take by mouth.       Review of patient's allergies indicates:  No Known Allergies    Review of Systems   Constitutional: Negative for chills, fever and malaise/fatigue.   HENT: Negative for ear pain and hearing loss.    Eyes: Negative for pain and discharge.   Respiratory: Negative for shortness of breath and wheezing.    Cardiovascular: Negative for chest pain, palpitations and leg swelling.   Gastrointestinal: Negative for blood in stool, constipation, diarrhea, nausea and vomiting.   Genitourinary: Negative for dysuria and hematuria.   Musculoskeletal: Negative for neck pain.   Skin: Positive for rash. Negative for itching.        Wasp sting   Neurological: Negative for weakness and headaches.   Endo/Heme/Allergies: Negative for polydipsia.       Objective:     Vitals:    07/15/19 1527   BP: 108/77   Pulse: 91   Temp: 98.2 °F (36.8 °C)     Body mass index is 34.7 kg/m².    Physical Exam   Constitutional: She is oriented to person, place, and time. She appears well-developed and well-nourished. No distress.   HENT:   Head: Normocephalic.   Cardiovascular: Normal rate and regular rhythm.   Pulmonary/Chest: Effort normal and breath sounds normal. No respiratory distress. She has no wheezes.   Musculoskeletal: She exhibits no edema.   Neurological: She is alert and oriented to person, place, and time.   Skin: Skin is warm and dry. Lesion noted. There is erythema.        Central lesion/sting wound with surround 4.5 cm in diameter or erythema. Mild tenderness in region. Some swelling on top of hand (no swelling of palm of upper extremity/wrist).   Pulses of RUE normal, sensation  intact. ROM normal as well.    Psychiatric: She has a normal mood and affect.   Nursing note and vitals reviewed.      Assessment and Plan:     Wasp sting, accidental or unintentional, initial encounter    Cellulitis of other specified site    Other orders  -     cephALEXin (KEFLEX) 500 MG capsule; Take 1 capsule (500 mg total) by mouth every 12 (twelve) hours. for 5 days  Dispense: 10 capsule; Refill: 0  -     mupirocin (BACTROBAN) 2 % ointment; Apply topically 3 (three) times daily.  Dispense: 15 g; Refill: 0    will treat with keflex and topical bactroban  Advised patient to keep area clean   Try to wear gloves, long sleeve shirts and cover up as much as possible when in the garden to prevent future bites  Ok to take benadryl as needed for itching for this incident or in the future for swelling/itching after bite   Educated patient on signs of allergic reaction and when to go to ER   Patient understands and agrees with plan     No follow-ups on file.  Answers for HPI/ROS submitted by the patient on 7/14/2019   activity change: No  unexpected weight change: No  rhinorrhea: No  trouble swallowing: No  visual disturbance: No  chest tightness: No  polyuria: No  difficulty urinating: No  menstrual problem: No  joint swelling: No  arthralgias: No  confusion: No  dysphoric mood: No

## 2019-07-29 ENCOUNTER — TELEPHONE (OUTPATIENT)
Dept: ENDOSCOPY | Facility: HOSPITAL | Age: 59
End: 2019-07-29

## 2019-07-29 NOTE — TELEPHONE ENCOUNTER
Spoke with patient regarding suprep instructions.  Stated she had instructions with no prep time and also needed prescription called into [facility] pharmacy.

## 2019-07-29 NOTE — TELEPHONE ENCOUNTER
----- Message from Mary Ramirez LPN sent at 7/29/2019  3:40 PM CDT -----  Contact: Pt      ----- Message -----  From: Biride Gardner  Sent: 7/29/2019   3:31 PM  To: Martha Henriquez Staff    Please give pt a call at .[phone] (home)  She states that she hasn't received directions for her procedure or has she received the bowel prep kit. Procedure is 08/01

## 2019-08-01 ENCOUNTER — HOSPITAL ENCOUNTER (OUTPATIENT)
Facility: HOSPITAL | Age: 59
Discharge: HOME | End: 2019-08-01
Attending: INTERNAL MEDICINE | Admitting: INTERNAL MEDICINE
Payer: COMMERCIAL

## 2019-08-01 ENCOUNTER — ANESTHESIA EVENT (OUTPATIENT)
Dept: ENDOSCOPY | Facility: HOSPITAL | Age: 59
End: 2019-08-01
Payer: COMMERCIAL

## 2019-08-01 ENCOUNTER — ANESTHESIA (OUTPATIENT)
Dept: ENDOSCOPY | Facility: HOSPITAL | Age: 59
End: 2019-08-01
Payer: COMMERCIAL

## 2019-08-01 VITALS
OXYGEN SATURATION: 98 % | RESPIRATION RATE: 18 BRPM | HEIGHT: 60 IN | WEIGHT: 174.63 LBS | TEMPERATURE: 99 F | SYSTOLIC BLOOD PRESSURE: 118 MMHG | BODY MASS INDEX: 34.28 KG/M2 | DIASTOLIC BLOOD PRESSURE: 74 MMHG | HEART RATE: 56 BPM

## 2019-08-01 DIAGNOSIS — Z12.11 COLON CANCER SCREENING: Primary | ICD-10-CM

## 2019-08-01 PROCEDURE — 63600175 PHARM REV CODE 636 W HCPCS: Performed by: INTERNAL MEDICINE

## 2019-08-01 PROCEDURE — G0105 COLORECTAL SCRN; HI RISK IND: ICD-10-PCS | Mod: ,,, | Performed by: INTERNAL MEDICINE

## 2019-08-01 PROCEDURE — G0105 COLORECTAL SCRN; HI RISK IND: HCPCS | Mod: ,,, | Performed by: INTERNAL MEDICINE

## 2019-08-01 PROCEDURE — 63600175 PHARM REV CODE 636 W HCPCS: Performed by: NURSE ANESTHETIST, CERTIFIED REGISTERED

## 2019-08-01 PROCEDURE — 37000009 HC ANESTHESIA EA ADD 15 MINS: Performed by: INTERNAL MEDICINE

## 2019-08-01 PROCEDURE — 37000008 HC ANESTHESIA 1ST 15 MINUTES: Performed by: INTERNAL MEDICINE

## 2019-08-01 PROCEDURE — 25000003 PHARM REV CODE 250: Performed by: NURSE ANESTHETIST, CERTIFIED REGISTERED

## 2019-08-01 PROCEDURE — G0105 COLORECTAL SCRN; HI RISK IND: HCPCS | Performed by: INTERNAL MEDICINE

## 2019-08-01 RX ORDER — SODIUM CHLORIDE, SODIUM LACTATE, POTASSIUM CHLORIDE, CALCIUM CHLORIDE 600; 310; 30; 20 MG/100ML; MG/100ML; MG/100ML; MG/100ML
INJECTION, SOLUTION INTRAVENOUS CONTINUOUS
Status: DISCONTINUED | OUTPATIENT
Start: 2019-08-01 | End: 2019-08-01 | Stop reason: HOSPADM

## 2019-08-01 RX ORDER — LIDOCAINE HYDROCHLORIDE 10 MG/ML
INJECTION, SOLUTION EPIDURAL; INFILTRATION; INTRACAUDAL; PERINEURAL
Status: DISCONTINUED | OUTPATIENT
Start: 2019-08-01 | End: 2019-08-01

## 2019-08-01 RX ORDER — PROPOFOL 10 MG/ML
VIAL (ML) INTRAVENOUS
Status: DISCONTINUED | OUTPATIENT
Start: 2019-08-01 | End: 2019-08-01

## 2019-08-01 RX ADMIN — SODIUM CHLORIDE, SODIUM LACTATE, POTASSIUM CHLORIDE, AND CALCIUM CHLORIDE: 600; 310; 30; 20 INJECTION, SOLUTION INTRAVENOUS at 06:08

## 2019-08-01 RX ADMIN — PROPOFOL 50 MG: 10 INJECTION, EMULSION INTRAVENOUS at 07:08

## 2019-08-01 RX ADMIN — PROPOFOL 100 MG: 10 INJECTION, EMULSION INTRAVENOUS at 06:08

## 2019-08-01 RX ADMIN — LIDOCAINE HYDROCHLORIDE 50 MG: 10 INJECTION, SOLUTION EPIDURAL; INFILTRATION; INTRACAUDAL; PERINEURAL at 06:08

## 2019-08-01 RX ADMIN — PROPOFOL 20 MG: 10 INJECTION, EMULSION INTRAVENOUS at 07:08

## 2019-08-01 NOTE — H&P
PRE PROCEDURE H&P    Patient Name: Fatuma Akins  MRN: [id number]  : 1960  Date of Procedure:  2019  Referring Physician: Mercy Fitzpatrick MD  Primary Physician: Mercy Fitzpatrick MD  Procedure Physician: Florence Thomas MD       Planned Procedure: Colonoscopy  Diagnosis: family history of colon cancer  Chief Complaint: Same as above    HPI: Patient is an 59 y.o. female is here for crcs.     Last colonoscopy: 6 yrs ago  Family history: father and aunt  Anticoagulation: none    Past Medical History:   Past Medical History:   Diagnosis Date    Atrophic vaginitis     Hypertension     Hypertriglyceridemia     Hypothyroidism     Obesity         Past Surgical History:  Past Surgical History:   Procedure Laterality Date    BASAL CELL CARCINOMA EXCISION      Nose    COLONOSCOPY N/A 2013    Performed by Kt Chapa MD at [facility] END[facility]        Home Medications:  Prior to Admission medications    Medication Sig Start Date End Date Taking? Authorizing Provider   ascorbic acid/multivit-min (EMERGEN-C IMMUNE PLUS ORAL)  19  Yes Historical Provider, MD   calcium-vitamin D3 500 mg(1,250mg) -200 unit per tablet Take 1 tablet by mouth 2 (two) times daily with meals.   Yes Historical Provider, MD   coQ10, ubiquinol, 200 mg Cap Take by mouth.   Yes Historical Provider, MD   fish oil-omega-3 fatty acids 300-1,000 mg capsule Take 2 g by mouth once daily.   Yes Historical Provider, MD   hydroCHLOROthiazide (MICROZIDE) 12.5 mg capsule Take 1 capsule (12.5 mg total) by mouth once daily. 19  Yes Mercy Fitzpatrick MD   levothyroxine (SYNTHROID) 50 MCG tablet Take 1 tablet (50 mcg total) by mouth once daily. 19  Yes Mercy Fitzpatrick MD   mupirocin (BACTROBAN) 2 % ointment Apply topically 3 (three) times daily. 7/15/19  Yes Kaci Tello MD   vit A/C/E ac/ZnOx/cupric oxide (EYE VITAMIN AND MINERALS ORAL) Take by mouth.   Yes Historical Provider, MD   multivitamin with minerals tablet Take 1 tablet  by mouth once daily.    Historical Provider, MD        Allergies:  Review of patient's allergies indicates:  No Known Allergies     Social History:   Social History     Socioeconomic History    Marital status: Single     Spouse name: Not on file    Number of children: Not on file    Years of education: Not on file    Highest education level: Not on file   Occupational History    Not on file   Social Needs    Financial resource strain: Not hard at all    Food insecurity:     Worry: Never true     Inability: Never true    Transportation needs:     Medical: No     Non-medical: No   Tobacco Use    Smoking status: Never Smoker    Smokeless tobacco: Never Used   Substance and Sexual Activity    Alcohol use: Yes     Alcohol/week: 1.8 oz     Types: 3 Glasses of wine per week     Frequency: 2-3 times a week     Drinks per session: 1 or 2     Binge frequency: Never    Drug use: No    Sexual activity: Not on file   Lifestyle    Physical activity:     Days per week: 5 days     Minutes per session: 60 min    Stress: Not at all   Relationships    Social connections:     Talks on phone: More than three times a week     Gets together: More than three times a week     Attends Adventism service: Not on file     Active member of club or organization: Yes     Attends meetings of clubs or organizations: 1 to 4 times per year     Relationship status: Never    Other Topics Concern    Not on file   Social History Narrative    The patient is single, has no children, and lives alone. She is retired as a banking examiner.                Family History:  Family History   Problem Relation Age of Onset    Colon cancer Father 74    Retinal detachment Mother     Hypertension Mother     Diabetes Mother     Cataracts Mother     Hearing loss Mother     Colon cancer Paternal Aunt     Hypertension Maternal Aunt     Colon cancer Maternal Aunt     Kidney cancer Maternal Aunt     Breast cancer Maternal Aunt     Heart  disease Maternal Grandmother     Heart disease Maternal Grandfather     Heart disease Paternal Grandfather     Diabetes Other         Maternal great-grandfather    Hypertension Sister     Thyroid disease Sister         Goiter       ROS: No acute cardiac events, no acute respiratory complaints.     Physical Exam (all patients):    /79 (BP Location: Left arm, Patient Position: Lying)   Pulse 68   Temp 97.9 °F (36.6 °C) (Oral)   Resp 18   Ht 5' (1.524 m)   Wt 79.2 kg (174 lb 9.7 oz)   SpO2 (!) 93%   Breastfeeding? No   BMI 34.10 kg/m²   Lungs: Clear to auscultation bilaterally, respirations unlabored  Heart: Regular rate and rhythm, S1 and S2 normal, no obvious murmurs  Abdomen:         Soft, non-tender, bowel sounds normal, no masses, no organomegaly    Lab Results   Component Value Date    WBC 9.62 04/09/2019    MCV 87 04/09/2019    RDW 13.6 04/09/2019     (H) 04/09/2019    GLU 96 04/09/2019    BUN 13 04/09/2019     04/09/2019    K 4.3 04/09/2019     04/09/2019        SEDATION PLAN: per anesthesia      History reviewed, vital signs satisfactory, cardiopulmonary status satisfactory, sedation options, risks and plans have been discussed with the patient  All their questions were answered and the patient agrees to the sedation procedures as planned and the patient is deemed an appropriate candidate for the sedation as planned.    Procedure explained to patient, informed consent obtained and placed in chart.    Florence Thomas  8/1/2019  6:52 AM

## 2019-08-01 NOTE — DISCHARGE SUMMARY
Endoscopy Discharge Summary      Admit Date: 8/1/2019    Discharge Date and Time:  8/1/2019 7:12 AM    Attending Physician: Florence Thomas MD     Discharge Physician: Florence Thomas MD     Principal Admitting Diagnoses: Colon cancer screening         Discharge Diagnosis: The encounter diagnosis was Colon cancer screening.     Discharged Condition: Good    Indication for Admission: Colon cancer screening     Hospital Course: Patient was admitted for an inpatient procedure and tolerated the procedure well with no complications.    Significant Diagnostic Studies: Colonoscopy     Pathology (if any):  Specimen (12h ago, onward)    None          Estimated Blood Loss: 0 ml.    Discussed with: patient.    Disposition: Home.    Follow Up/Patient Instructions:   Current Discharge Medication List      CONTINUE these medications which have NOT CHANGED    Details   ascorbic acid/multivit-min (EMERGEN-C IMMUNE PLUS ORAL)       calcium-vitamin D3 500 mg(1,250mg) -200 unit per tablet Take 1 tablet by mouth 2 (two) times daily with meals.      coQ10, ubiquinol, 200 mg Cap Take by mouth.      fish oil-omega-3 fatty acids 300-1,000 mg capsule Take 2 g by mouth once daily.      hydroCHLOROthiazide (MICROZIDE) 12.5 mg capsule Take 1 capsule (12.5 mg total) by mouth once daily.  Qty: 90 capsule, Refills: 3      levothyroxine (SYNTHROID) 50 MCG tablet Take 1 tablet (50 mcg total) by mouth once daily.  Qty: 90 tablet, Refills: 3      mupirocin (BACTROBAN) 2 % ointment Apply topically 3 (three) times daily.  Qty: 15 g, Refills: 0      vit A/C/E ac/ZnOx/cupric oxide (EYE VITAMIN AND MINERALS ORAL) Take by mouth.      multivitamin with minerals tablet Take 1 tablet by mouth once daily.             Discharge Procedure Orders   Diet general     Call MD for:  temperature >100.4     Call MD for:  persistent nausea and vomiting     Call MD for:  severe uncontrolled pain     Call MD for:  difficulty breathing, headache or visual disturbances      Activity as tolerated

## 2019-08-01 NOTE — TRANSFER OF CARE
Anesthesia Transfer of Care Note    Patient: Fatuma Akins    Procedure(s) Performed: Procedure(s) (LRB):  COLONOSCOPY (N/A)    Patient location: GI    Anesthesia Type: MAC    Transport from OR: Transported from OR on room air with adequate spontaneous ventilation    Post pain: adequate analgesia    Post assessment: no apparent anesthetic complications and tolerated procedure well    Post vital signs: stable    Level of consciousness: awake, alert and oriented    Nausea/Vomiting: no nausea/vomiting    Complications: none    Transfer of care protocol was followed      Last vitals:   Visit Vitals  /79 (BP Location: Left arm, Patient Position: Lying)   Pulse 68   Temp 36.6 °C (97.9 °F) (Oral)   Resp 18   Ht 5' (1.524 m)   Wt 79.2 kg (174 lb 9.7 oz)   SpO2 (!) 93%   Breastfeeding? No   BMI 34.10 kg/m²

## 2019-08-01 NOTE — ANESTHESIA PREPROCEDURE EVALUATION
08/01/2019  Fatuma Akins is a 59 y.o., female.    Pre-op Assessment    I have reviewed the Patient Summary Reports.    I have reviewed the Nursing Notes.   I have reviewed the Medications.     Review of Systems  Anesthesia Hx:  No problems with previous Anesthesia  Neg history of prior surgery. Denies Family Hx of Anesthesia complications.   Denies Personal Hx of Anesthesia complications.   Social:  No Alcohol Use, Non-Smoker    Hematology/Oncology:  Hematology Normal   Oncology Normal     EENT/Dental:EENT/Dental Normal   Cardiovascular:   Hypertension NYHA Classification I ECG has been reviewed.  Hypertension    Pulmonary:  Pulmonary Normal    Renal/:  Renal/ Normal     Hepatic/GI:  Hepatic/GI Normal    Musculoskeletal:  Musculoskeletal Normal    Neurological:  Neurology Normal    Endocrine:   Hypothyroidism    Dermatological:  Skin Normal    Psych:  Psychiatric Normal           Physical Exam  General:  Well nourished    Airway/Jaw/Neck:  AIRWAY FINDINGS: Normal      Eyes/Ears/Nose:  EYES/EARS/NOSE FINDINGS: Normal   Dental:  DENTAL FINDINGS: Normal   Chest/Lungs:  Chest/Lungs Clear    Heart/Vascular:  Heart Findings: Normal Heart murmur: negative Vascular Findings: Normal    Abdomen:  Abdomen Findings: Normal    Musculoskeletal:  Musculoskeletal Findings: Normal   Skin:  Skin Findings: Normal    Mental Status:  Mental Status Findings: Normal        Anesthesia Plan  Type of Anesthesia, risks & benefits discussed:  Anesthesia Type:  MAC  Patient's Preference:   Intra-op Monitoring Plan: standard ASA monitors  Intra-op Monitoring Plan Comments:   Post Op Pain Control Plan: IV/PO Opioids PRN  Post Op Pain Control Plan Comments:   Induction:   IV  Beta Blocker:  Patient is not currently on a Beta-Blocker (No further documentation required).       Informed Consent: Patient understands risks and agrees with  Anesthesia plan.  Questions answered. Anesthesia consent signed with patient.  ASA Score: 2     Day of Surgery Review of History & Physical: I have interviewed and examined the patient. I have reviewed the patient's H&P dated:  There are no significant changes.  H&P update referred to the surgeon.  H&P completed by Anesthesiologist.

## 2019-08-01 NOTE — ANESTHESIA POSTPROCEDURE EVALUATION
Anesthesia Post Evaluation    Patient: Fatuma Akins    Procedure(s) Performed: Procedure(s) (LRB):  COLONOSCOPY (N/A)    Final Anesthesia Type: MAC  Patient location during evaluation: GI PACU  Patient participation: Yes- Able to Participate  Level of consciousness: awake and alert and oriented  Post-procedure vital signs: reviewed and stable  Pain management: adequate  Airway patency: patent  PONV status at discharge: No PONV  Anesthetic complications: no      Cardiovascular status: stable  Respiratory status: unassisted, spontaneous ventilation and room air  Hydration status: euvolemic  Follow-up not needed.          Vitals Value Taken Time   /86 8/1/2019  7:11 AM   Temp 37.1 °C (98.8 °F) 8/1/2019  7:11 AM   Pulse 75 8/1/2019  7:11 AM   Resp 20 8/1/2019  7:11 AM   SpO2 98 % 8/1/2019  7:11 AM         No case tracking events are documented in the log.      Pain/Jack Score: Jack Score: 10 (8/1/2019  7:11 AM)

## 2019-08-01 NOTE — ANESTHESIA RELEASE NOTE
Anesthesia Release from PACU Note    Patient: Fatuma Akins    Procedure(s) Performed: Procedure(s) (LRB):  COLONOSCOPY (N/A)    Anesthesia type: MAC    Post pain: Adequate analgesia    Post assessment: no apparent anesthetic complications    Last Vitals:   Visit Vitals  /86   Pulse 75   Temp 37.1 °C (98.8 °F)   Resp 20   Ht 5' (1.524 m)   Wt 79.2 kg (174 lb 9.7 oz)   SpO2 98%   Breastfeeding? No   BMI 34.10 kg/m²       Post vital signs: stable    Level of consciousness: awake    Nausea/Vomiting: no nausea/no vomiting    Complications: none    Airway Patency: patent    Respiratory: unassisted    Cardiovascular: stable and blood pressure at baseline    Hydration: euvolemic

## 2019-08-01 NOTE — [employer] PATIENT INSTRUCTIONS
Discharge Summary/Instructions after an Endoscopic Procedure  Patient Name: Fatuma Akins  Patient MRN: [id number]  Patient YOB: 1960 Thursday, August 01, 2019 Florence Thomas MD  RESTRICTIONS:  During your procedure today, you received medications for sedation.  These   medications may affect your judgment, balance and coordination.  Therefore,   for 24 hours, you have the following restrictions:   - DO NOT drive a car, operate machinery, make legal/financial decisions,   sign important papers or drink alcohol.    ACTIVITY:  Today: no heavy lifting, straining or running due to procedural   sedation/anesthesia.  The following day: return to full activity including work.  DIET:  Eat and drink normally unless instructed otherwise.     TREATMENT FOR COMMON SIDE EFFECTS:  - Mild abdominal pain, nausea, belching, bloating or excessive gas:  rest,   eat lightly and use a heating pad.  - Sore Throat: treat with throat lozenges and/or gargle with warm salt   water.  - Because air was used during the procedure, expelling large amounts of air   from your rectum or belching is normal.  - If a bowel prep was taken, you may not have a bowel movement for 1-3 days.    This is normal.  SYMPTOMS TO WATCH FOR AND REPORT TO YOUR PHYSICIAN:  1. Abdominal pain or bloating, other than gas cramps.  2. Chest pain.  3. Back pain.  4. Signs of infection such as: chills or fever occurring within 24 hours   after the procedure.  5. Rectal bleeding, which would show as bright red, maroon, or black stools.   (A tablespoon of blood from the rectum is not serious, especially if   hemorrhoids are present.)  6. Vomiting.  7. Weakness or dizziness.  GO DIRECTLY TO THE NEAREST EMERGENCY ROOM IF YOU HAVE ANY OF THE FOLLOWING:      Difficulty breathing              Chills and/or fever over 101 F   Persistent vomiting and/or vomiting blood   Severe abdominal pain   Severe chest pain   Black, tarry stools   Bleeding- more than one  tablespoon   Any other symptom or condition that you feel may need urgent attention  Your doctor recommends these additional instructions:  If any biopsies were taken, your doctors clinic will contact you in 1 to 2   weeks with any results.  - Patient has a contact number available for emergencies.  The signs and   symptoms of potential delayed complications were discussed with the   patient.  Return to normal activities tomorrow.  Written discharge   instructions were provided to the patient.   - Discharge patient to home (via wheelchair).   - Resume previous diet today.   - Continue present medications.   - Repeat colonoscopy in 5 years for screening purposes.  For questions, problems or results please call your physician Florence Thomas MD at Work:  [phone]  If you have any questions about the above instructions, call the GI   department at [phone] or call the endoscopy unit at [phone]   from 7am until 3 pm.  [facility] [place], EMERGENCY ROOM PHONE NUMBER:   [phone]  IF A COMPLICATION OR EMERGENCY SITUATION ARISES AND YOU ARE UNABLE TO REACH   YOUR PHYSICIAN - GO DIRECTLY TO THE EMERGENCY ROOM.  I have read or have had read to me these discharge instructions for my   procedure and have received a written copy.  I understand these   instructions and will follow-up with my physician if I have any questions.     __________________________________       _____________________________________  Nurse Signature                                          Patient/Designated   Responsible Party Signature  MD Florence Camilo MD  8/1/2019 7:14:54 AM  This report has been verified and signed electronically.  [employer]

## 2019-08-22 ENCOUNTER — OFFICE VISIT (OUTPATIENT)
Dept: DERMATOLOGY | Facility: CLINIC | Age: 59
End: 2019-08-22
Payer: COMMERCIAL

## 2019-08-22 DIAGNOSIS — L90.5 SCAR CONDITIONS/SKIN FIBROSIS: Primary | ICD-10-CM

## 2019-08-22 DIAGNOSIS — Z85.828 HISTORY OF SKIN CANCER: ICD-10-CM

## 2019-08-22 DIAGNOSIS — Z12.83 SCREENING, MALIGNANT NEOPLASM, SKIN: ICD-10-CM

## 2019-08-22 DIAGNOSIS — D22.9 MULTIPLE NEVI: ICD-10-CM

## 2019-08-22 PROCEDURE — 99213 OFFICE O/P EST LOW 20 MIN: CPT | Mod: S$GLB,,, | Performed by: DERMATOLOGY

## 2019-08-22 PROCEDURE — 99999 PR PBB SHADOW E&M-EST. PATIENT-LVL III: ICD-10-PCS | Mod: PBBFAC,,, | Performed by: DERMATOLOGY

## 2019-08-22 PROCEDURE — 99213 PR OFFICE/OUTPT VISIT, EST, LEVL III, 20-29 MIN: ICD-10-PCS | Mod: S$GLB,,, | Performed by: DERMATOLOGY

## 2019-08-22 PROCEDURE — 99999 PR PBB SHADOW E&M-EST. PATIENT-LVL III: CPT | Mod: PBBFAC,,, | Performed by: DERMATOLOGY

## 2019-08-22 NOTE — PROGRESS NOTES
Subjective:       Patient ID:  Fatuma Akins is a 59 y.o. female who presents for   Chief Complaint   Patient presents with    Skin Check     TBSE,,,hx of  NMSC     Hx of NMSC of the nose (s/p Mohs by Dr. Elizabeth > 10 years ago), last seen on 1/1/7/19. This is a high risk patient here to check for the development of new lesions. Denies bleeding, tender, growing, or concerning lesions.         Review of Systems   Constitutional: Negative for fever and chills.   Gastrointestinal: Negative for nausea and vomiting.   Skin: Positive for daily sunscreen use and activity-related sunscreen use. Negative for recent sunburn.   Hematologic/Lymphatic: Does not bruise/bleed easily.        Objective:    Physical Exam   Constitutional: She appears well-developed and well-nourished. No distress.   Neurological: She is alert and oriented to person, place, and time. She is not disoriented.   Psychiatric: She has a normal mood and affect.   Skin:   Areas Examined (abnormalities noted in diagram):   Head / Face Inspection Performed  Neck Inspection Performed  Chest / Axilla Inspection Performed  Abdomen Inspection Performed  Back Inspection Performed  RUE Inspected  LUE Inspection Performed  RLE Inspected  LLE Inspection Performed  Nails and Digits Inspection Performed                   Diagram Legend     Erythematous scaling macule/papule c/w actinic keratosis       Vascular papule c/w angioma      Pigmented verrucoid papule/plaque c/w seborrheic keratosis      Yellow umbilicated papule c/w sebaceous hyperplasia      Irregularly shaped tan macule c/w lentigo     1-2 mm smooth white papules consistent with Milia      Movable subcutaneous cyst with punctum c/w epidermal inclusion cyst      Subcutaneous movable cyst c/w pilar cyst      Firm pink to brown papule c/w dermatofibroma      Pedunculated fleshy papule(s) c/w skin tag(s)      Evenly pigmented macule c/w junctional nevus     Mildly variegated pigmented, slightly  irregular-bordered macule c/w mildly atypical nevus      Flesh colored to evenly pigmented papule c/w intradermal nevus       Pink pearly papule/plaque c/w basal cell carcinoma      Erythematous hyperkeratotic cursted plaque c/w SCC      Surgical scar with no sign of skin cancer recurrence      Open and closed comedones      Inflammatory papules and pustules      Verrucoid papule consistent consistent with wart     Erythematous eczematous patches and plaques     Dystrophic onycholytic nail with subungual debris c/w onychomycosis     Umbilicated papule    Erythematous-base heme-crusted tan verrucoid plaque consistent with inflamed seborrheic keratosis     Erythematous Silvery Scaling Plaque c/w Psoriasis     See annotation      Assessment / Plan:        Scar conditions/skin fibrosis  Screening, malignant neoplasm, skin  History of skin cancer  Scar of the nose, hx of NMSC.  No evidence of recurrence on physical exam today.  Continue routine skin surveillance. Daily sunscreen advised.    Multiple nevi  Reassurance given.  Lesions are benign.             Follow up in about 1 year (around 8/22/2020).

## 2019-09-02 ENCOUNTER — PATIENT MESSAGE (OUTPATIENT)
Dept: ADMINISTRATIVE | Facility: OTHER | Age: 59
End: 2019-09-02

## 2019-09-05 ENCOUNTER — PATIENT OUTREACH (OUTPATIENT)
Dept: OTHER | Facility: OTHER | Age: 59
End: 2019-09-05

## 2019-09-05 NOTE — PROGRESS NOTES
Last 5 Patient Entered Readings                                      Current 30 Day Average: 111/81     Recent Readings 9/5/2019 9/4/2019 8/30/2019 8/29/2019 8/28/2019    SBP (mmHg) 112 118 106 109 107    DBP (mmHg) 83 85 84 84 80    Pulse 60 74 70 69 68            Digital Medicine: Health  Follow Up    Left voicemail to follow up with Mrs. Fatuma Akins.  Current BP average 111/81 mmHg is borderline at goal, <130/80.

## 2019-10-23 ENCOUNTER — OFFICE VISIT (OUTPATIENT)
Dept: OPHTHALMOLOGY | Facility: CLINIC | Age: 59
End: 2019-10-23
Payer: COMMERCIAL

## 2019-10-23 ENCOUNTER — IMMUNIZATION (OUTPATIENT)
Dept: INTERNAL MEDICINE | Facility: CLINIC | Age: 59
End: 2019-10-23
Payer: COMMERCIAL

## 2019-10-23 DIAGNOSIS — H52.13 MYOPIA, BILATERAL: ICD-10-CM

## 2019-10-23 DIAGNOSIS — H52.4 BILATERAL PRESBYOPIA: ICD-10-CM

## 2019-10-23 DIAGNOSIS — H25.13 CATARACT, NUCLEAR SCLEROTIC SENILE, BILATERAL: ICD-10-CM

## 2019-10-23 DIAGNOSIS — I10 ESSENTIAL HYPERTENSION: Primary | ICD-10-CM

## 2019-10-23 PROCEDURE — 90471 FLU VACCINE (QUAD) GREATER THAN OR EQUAL TO 3YO PRESERVATIVE FREE IM: ICD-10-PCS | Mod: S$GLB,,, | Performed by: NURSE PRACTITIONER

## 2019-10-23 PROCEDURE — 99999 PR PBB SHADOW E&M-EST. PATIENT-LVL II: ICD-10-PCS | Mod: PBBFAC,,, | Performed by: OPTOMETRIST

## 2019-10-23 PROCEDURE — 92014 PR EYE EXAM, EST PATIENT,COMPREHESV: ICD-10-PCS | Mod: S$GLB,,, | Performed by: OPTOMETRIST

## 2019-10-23 PROCEDURE — 90686 FLU VACCINE (QUAD) GREATER THAN OR EQUAL TO 3YO PRESERVATIVE FREE IM: ICD-10-PCS | Mod: S$GLB,,, | Performed by: NURSE PRACTITIONER

## 2019-10-23 PROCEDURE — 99999 PR PBB SHADOW E&M-EST. PATIENT-LVL II: CPT | Mod: PBBFAC,,,

## 2019-10-23 PROCEDURE — 92014 COMPRE OPH EXAM EST PT 1/>: CPT | Mod: S$GLB,,, | Performed by: OPTOMETRIST

## 2019-10-23 PROCEDURE — 90471 IMMUNIZATION ADMIN: CPT | Mod: S$GLB,,, | Performed by: NURSE PRACTITIONER

## 2019-10-23 PROCEDURE — 99999 PR PBB SHADOW E&M-EST. PATIENT-LVL II: ICD-10-PCS | Mod: PBBFAC,,,

## 2019-10-23 PROCEDURE — 99999 PR PBB SHADOW E&M-EST. PATIENT-LVL II: CPT | Mod: PBBFAC,,, | Performed by: OPTOMETRIST

## 2019-10-23 PROCEDURE — 92015 PR REFRACTION: ICD-10-PCS | Mod: S$GLB,,, | Performed by: OPTOMETRIST

## 2019-10-23 PROCEDURE — 90686 IIV4 VACC NO PRSV 0.5 ML IM: CPT | Mod: S$GLB,,, | Performed by: NURSE PRACTITIONER

## 2019-10-23 PROCEDURE — 92015 DETERMINE REFRACTIVE STATE: CPT | Mod: S$GLB,,, | Performed by: OPTOMETRIST

## 2019-10-23 NOTE — PROGRESS NOTES
HPI     No visual complaints.   Last eye exam 10/24/2018 TRF.  Update glasses RX.    Last edited by Anabella Love on 10/23/2019  1:17 PM. (History)            Assessment /Plan     For exam results, see Encounter Report.    Essential hypertension    Cataract, nuclear sclerotic senile, bilateral    Myopia, bilateral    Bilateral presbyopia      No HTN Retinopathy    Minimal cataracts OU, not surgical    Dispense Final Rx for glasses.  RTC 1 year  Discussed above and answered questions.

## 2019-11-11 ENCOUNTER — PATIENT OUTREACH (OUTPATIENT)
Dept: OTHER | Facility: OTHER | Age: 59
End: 2019-11-11

## 2019-11-11 NOTE — PROGRESS NOTES
Last 5 Patient Entered Readings                                      Current 30 Day Average: 113/79     Recent Readings 11/11/2019 11/8/2019 11/4/2019 10/28/2019 10/28/2019    SBP (mmHg) 110 123 109 120 98    DBP (mmHg) 82 78 79 79 78    Pulse 64 66 70 79 73        Hypertension Medications             hydroCHLOROthiazide (MICROZIDE) 12.5 mg capsule Take 1 capsule (12.5 mg total) by mouth once daily.        Called patient to follow up, reviewed BP readings. Per 2017 ACC/ AHA HTN guidelines  (goal of BP < 130/80), current 30-day average is well controlled.  LVM, requested patient call back at her convenience if she has any questions or concerns.  Will continue to monitor. WCB in 6 months, sooner if BP begins to trend up or down.

## 2020-02-17 ENCOUNTER — PATIENT MESSAGE (OUTPATIENT)
Dept: ADMINISTRATIVE | Facility: OTHER | Age: 60
End: 2020-02-17

## 2020-02-18 DIAGNOSIS — Z13.79 GENETIC SCREENING: ICD-10-CM

## 2020-02-21 NOTE — PROGRESS NOTES
Digital Medicine: Health  Follow-Up    HPI    Intervention/Plan    There are no preventive care reminders to display for this patient.    Last 5 Patient Entered Readings                                      Current 30 Day Average: 114/81     Recent Readings 2/17/2020 2/14/2020 2/13/2020 2/12/2020 2/11/2020    SBP (mmHg) 123 118 110 110 116    DBP (mmHg) 78 85 80 82 81    Pulse 64 61 56 67 69                  Screenings    SDOH

## 2020-02-26 ENCOUNTER — PATIENT MESSAGE (OUTPATIENT)
Dept: ADMINISTRATIVE | Facility: OTHER | Age: 60
End: 2020-02-26

## 2020-04-06 ENCOUNTER — PATIENT MESSAGE (OUTPATIENT)
Dept: FAMILY MEDICINE | Facility: CLINIC | Age: 60
End: 2020-04-06

## 2020-04-06 RX ORDER — HYDROCHLOROTHIAZIDE 12.5 MG/1
12.5 CAPSULE ORAL DAILY
Qty: 90 CAPSULE | Refills: 0 | Status: SHIPPED | OUTPATIENT
Start: 2020-04-06 | End: 2020-06-12 | Stop reason: SDUPTHER

## 2020-04-06 RX ORDER — LEVOTHYROXINE SODIUM 50 UG/1
50 TABLET ORAL DAILY
Qty: 90 TABLET | Refills: 0 | Status: SHIPPED | OUTPATIENT
Start: 2020-04-06 | End: 2020-06-12 | Stop reason: SDUPTHER

## 2020-04-13 NOTE — PROGRESS NOTES
Digital Medicine: Health  Follow-Up    The history is provided by the patient.     Follow Up  Follow-up reason(s): routine education      Routine Education Topics: physical activity  Patient stated that she is doing well and feeling good. She denies any major issues related to her BP at this time.     Encouraged her to continue submitting her BP readings weekly.       Intervention/Plan        Topic    Lipid (Cholesterol) Test        Last 5 Patient Entered Readings                                      Current 30 Day Average: 115/82     Recent Readings 4/11/2020 4/2/2020 3/25/2020 3/18/2020 3/10/2020    SBP (mmHg) 122 108 116 113 101    DBP (mmHg) 81 84 79 84 82    Pulse 56 60 60 73 64                      Physical Activity Screening   When asked if exercising, patient responded: yesHer level of intensity when exercising is low.    Patient participates in the following activities: yard/housework    Patient stated that she has not been doing much physical activity because she rather go to group classes with other people which she cannot do right now because of covid. She did mention that she does have a few exercise tapes that she can do while at home along with gardening and doing other yard work. She will work on getting more active.     Medication Adherence Screening   She did not miss a dose this month.    Patient identified the following reasons for non-compliance: None      SDOH

## 2020-04-27 ENCOUNTER — PATIENT OUTREACH (OUTPATIENT)
Dept: OTHER | Facility: OTHER | Age: 60
End: 2020-04-27

## 2020-06-09 ENCOUNTER — OFFICE VISIT (OUTPATIENT)
Dept: FAMILY MEDICINE | Facility: CLINIC | Age: 60
End: 2020-06-09
Payer: COMMERCIAL

## 2020-06-09 ENCOUNTER — HOSPITAL ENCOUNTER (OUTPATIENT)
Dept: RADIOLOGY | Facility: HOSPITAL | Age: 60
Discharge: HOME | End: 2020-06-09
Attending: FAMILY MEDICINE
Payer: COMMERCIAL

## 2020-06-09 VITALS
WEIGHT: 183.88 LBS | SYSTOLIC BLOOD PRESSURE: 110 MMHG | BODY MASS INDEX: 36.1 KG/M2 | OXYGEN SATURATION: 96 % | DIASTOLIC BLOOD PRESSURE: 80 MMHG | TEMPERATURE: 99 F | RESPIRATION RATE: 18 BRPM | HEIGHT: 60 IN | HEART RATE: 94 BPM

## 2020-06-09 DIAGNOSIS — M65.332 TRIGGER MIDDLE FINGER OF LEFT HAND: ICD-10-CM

## 2020-06-09 DIAGNOSIS — M25.511 CHRONIC PAIN OF BOTH SHOULDERS: ICD-10-CM

## 2020-06-09 DIAGNOSIS — M25.512 CHRONIC PAIN OF BOTH SHOULDERS: ICD-10-CM

## 2020-06-09 DIAGNOSIS — Z00.00 PREVENTATIVE HEALTH CARE: Primary | ICD-10-CM

## 2020-06-09 DIAGNOSIS — E78.1 HYPERTRIGLYCERIDEMIA: ICD-10-CM

## 2020-06-09 DIAGNOSIS — G89.29 CHRONIC PAIN OF BOTH SHOULDERS: ICD-10-CM

## 2020-06-09 DIAGNOSIS — Z12.31 ENCOUNTER FOR SCREENING MAMMOGRAM FOR MALIGNANT NEOPLASM OF BREAST: ICD-10-CM

## 2020-06-09 DIAGNOSIS — I10 ESSENTIAL HYPERTENSION: ICD-10-CM

## 2020-06-09 DIAGNOSIS — E03.9 ACQUIRED HYPOTHYROIDISM: ICD-10-CM

## 2020-06-09 PROBLEM — Z12.11 COLON CANCER SCREENING: Status: RESOLVED | Noted: 2019-08-01 | Resolved: 2020-06-09

## 2020-06-09 PROCEDURE — 99396 PR PREVENTIVE VISIT,EST,40-64: ICD-10-PCS | Mod: S$GLB,,, | Performed by: FAMILY MEDICINE

## 2020-06-09 PROCEDURE — 3074F SYST BP LT 130 MM HG: CPT | Mod: CPTII,S$GLB,, | Performed by: FAMILY MEDICINE

## 2020-06-09 PROCEDURE — 99999 PR PBB SHADOW E&M-EST. PATIENT-LVL V: ICD-10-PCS | Mod: PBBFAC,,, | Performed by: FAMILY MEDICINE

## 2020-06-09 PROCEDURE — 3079F DIAST BP 80-89 MM HG: CPT | Mod: CPTII,S$GLB,, | Performed by: FAMILY MEDICINE

## 2020-06-09 PROCEDURE — 99999 PR PBB SHADOW E&M-EST. PATIENT-LVL V: CPT | Mod: PBBFAC,,, | Performed by: FAMILY MEDICINE

## 2020-06-09 PROCEDURE — 73030 X-RAY EXAM OF SHOULDER: CPT | Mod: 26,LT,, | Performed by: RADIOLOGY

## 2020-06-09 PROCEDURE — 3079F PR MOST RECENT DIASTOLIC BLOOD PRESSURE 80-89 MM HG: ICD-10-PCS | Mod: CPTII,S$GLB,, | Performed by: FAMILY MEDICINE

## 2020-06-09 PROCEDURE — 99396 PREV VISIT EST AGE 40-64: CPT | Mod: S$GLB,,, | Performed by: FAMILY MEDICINE

## 2020-06-09 PROCEDURE — 73030 X-RAY EXAM OF SHOULDER: CPT | Mod: 26,RT,, | Performed by: RADIOLOGY

## 2020-06-09 PROCEDURE — 73030 X-RAY EXAM OF SHOULDER: CPT | Mod: TC,FY,PO,LT

## 2020-06-09 PROCEDURE — 73030 X-RAY EXAM OF SHOULDER: CPT | Mod: TC,FY,PO,RT

## 2020-06-09 PROCEDURE — 3074F PR MOST RECENT SYSTOLIC BLOOD PRESSURE < 130 MM HG: ICD-10-PCS | Mod: CPTII,S$GLB,, | Performed by: FAMILY MEDICINE

## 2020-06-09 PROCEDURE — 73030 XR SHOULDER COMPLETE 2 OR MORE VIEWS LEFT: ICD-10-PCS | Mod: 26,LT,, | Performed by: RADIOLOGY

## 2020-06-09 RX ORDER — NAPROXEN 500 MG/1
500 TABLET ORAL 2 TIMES DAILY
Qty: 30 TABLET | Refills: 2 | Status: SHIPPED | OUTPATIENT
Start: 2020-06-09 | End: 2020-07-22 | Stop reason: SDUPTHER

## 2020-06-09 RX ORDER — ACETAMINOPHEN 500 MG
TABLET ORAL
COMMUNITY
Start: 2020-03-01

## 2020-06-09 NOTE — PROGRESS NOTES
CHIEF COMPLAINT:  This is a 60-year-old female here for preventive health exam.     SUBJECTIVE: The patient is doing well without complaints except for shoulder pain, bilaterally, left greater than right for one year. She complains of left middle trigger finger.  Her blood pressure is controlled on hydrochlorothiazide 12.5 mg daily.  She also takes levothyroxine for acquired hypothyroidism.  Patient has a strong family history of colon cancer and requests colonoscopy.  No menses for 8 years.     Eye exam October 2019 . Pap smear April 2019, due again in March 2022. Mammogram April 2019. Colonoscopy August 2019, due again in August 2024. Tdap August 2009. Flu vaccine October 2019.     ROS:  GENERAL: Patient denies fever, chills, night sweats. Patient denies weight gain. Patient denies anorexia, fatigue, weakness or swollen glands.  SKIN: Patient denies rash or hair loss.  HEENT: Patient denies sore throat, ear pain, hearing loss, nasal congestion, or runny nose. Patient denies visual disturbance, eye irritation or discharge.  LUNGS: Patient denies cough, wheeze or hemoptysis.  CARDIOVASCULAR: Patient denies chest pain, shortness of breath, palpitations, syncope or lower extremity edema.  GI: Patient denies abdominal pain, nausea, vomiting, diarrhea, constipation, blood in stool or melena.  GENITOURINARY: Patient denies pelvic pain, vaginal discharge, itch or odor. Patient denies irregular vaginal bleeding. Patient denies dysuria, frequency, hematuria, nocturia, urgency or incontinence.  BREASTS: Patient denies breast pain, mass or nipple discharge.  MUSCULOSKELETAL: Patient denies joint pain, swelling, redness or warmth.  NEUROLOGIC: Patient denies headache, vertigo, paresthesias, weakness in limb, dysarthria, dysphagia or abnormality of gait.  PSYCHIATRIC: Patient denies anxiety, depression, or memory loss.     OBJECTIVE:   GENERAL: Well-developed well-nourished, obese, white female alert and oriented x3, in no  acute distress. Memory, judgment and cognition without deficit.  SKIN: Clear without rash. Normal color and tone.  HEENT: Eyes: Clear conjunctivae. Pupils equal reactive to light and accommodation. Ears: Clear TMs. Clear canals. Nose: Without congestion. Pharynx: Without injection or exudates.  NECK: Supple, normal range of motion. No masses, lymphadenopathy or enlarged thyroid. No JVD. Carotids 2+ and equal. No bruits.  LUNGS: Clear to auscultation. Normal respiratory effort.  CARDIOVASCULAR: Regular rhythm, normal S1, S2 without murmur, gallop or rub.  BACK: No CVA or spinal tenderness.  BREASTS: No masses, tenderness or nipple discharge.  ABDOMEN: Normal appearance. Active bowel sounds. Soft, nontender without mass or organomegaly. No rebound or guarding.  EXTREMITIES: Without cyanosis, clubbing or edema. Distal pulses 2+ and equal. Normal range of motion in all extremities. No joint effusion, erythema or warmth.  Negative impingement sign.  Triggering of left middle finger.  NEUROLOGIC: Cranial nerves II through XII without deficit. Motor strength equal bilaterally. Sensation normal to touch. Deep tendon reflexes 2+ and equal. Gait without abnormality. No tremor. Negative cerebellar signs.  PELVIC: External: Without lesions or inflammation. Vaginal: Clear, atrophic changes. Cervix: Friable, atrophic. Nontender. Pap x2. Uterus: Normal size and shape. Adnexa: Non-tender, without masses. Rectovaginal: Confirms, heme-negative stool x2.    ASSESSMENT:  1. Preventative health care    2. Acquired hypothyroidism    3. Essential hypertension    4. Hypertriglyceridemia    5. Chronic pain of both shoulders    6. Trigger middle finger of left hand    7. Encounter for screening mammogram for malignant neoplasm of breast      PLAN:  1.  Weight reduction. Exercise regularly.  2.  Age-appropriate counseling.  3.  Fasting lab.  4.  Mammogram.  5.  Bilateral shoulder x-rays.  6.  Naproxen 500 mg twice daily.  7.  Shoulder  exercises.  8.  Physical therapy.    This note is generated with speech recognition software and is subject to transcription error and sound alike phrases that may be missed by proofreading.

## 2020-06-12 RX ORDER — LEVOTHYROXINE SODIUM 50 UG/1
50 TABLET ORAL DAILY
Qty: 90 TABLET | Refills: 3 | Status: SHIPPED | OUTPATIENT
Start: 2020-06-12 | End: 2021-06-29 | Stop reason: SDUPTHER

## 2020-06-12 RX ORDER — HYDROCHLOROTHIAZIDE 12.5 MG/1
12.5 CAPSULE ORAL DAILY
Qty: 90 CAPSULE | Refills: 3 | Status: SHIPPED | OUTPATIENT
Start: 2020-06-12 | End: 2021-03-29

## 2020-06-16 ENCOUNTER — CLINICAL SUPPORT (OUTPATIENT)
Dept: REHABILITATION | Facility: HOSPITAL | Age: 60
End: 2020-06-16
Payer: COMMERCIAL

## 2020-06-16 DIAGNOSIS — M25.511 CHRONIC PAIN OF BOTH SHOULDERS: ICD-10-CM

## 2020-06-16 DIAGNOSIS — M25.512 CHRONIC PAIN OF BOTH SHOULDERS: ICD-10-CM

## 2020-06-16 DIAGNOSIS — G89.29 CHRONIC PAIN OF BOTH SHOULDERS: ICD-10-CM

## 2020-06-16 PROCEDURE — 97161 PT EVAL LOW COMPLEX 20 MIN: CPT | Performed by: PHYSICAL THERAPIST

## 2020-06-16 NOTE — PLAN OF CARE
[facility]  Physical Therapy Initial Evaluation    Name: Fatuma Akins  Clinic Number: [id number]    Therapy Diagnosis:   Encounter Diagnosis   Name Primary?    Chronic pain of both shoulders      Physician: Mercy Fitzpatrick MD    Physician Orders: PT Eval and Treat  Medical Diagnosis from Referral: Chronic pain of both shoulders  Evaluation Date: 6/16/2020  Authorization Period Expiration: 8/15/2020  Plan of Care Expiration: 8/15/2020  Visit # / Visits authorized: 1/ 20    Precautions: Standard    Time In: 950  Time Out: 1030  Total Billable Time: 5 minutes    SUBJECTIVE   Date of onset: 6-8 months ago  History of current condition - Fatuma is a 60 y.o. female whom reports she started doing TRX classes, developed a soreness in the shoulders.  She reports that she developed an underlying shoulder pain as the soreness wore off.  She reports that she has difficulty with lifting heavy bags of soil for gardening, and is unable to lift iron pot when cleaning dishes.  She reports that she has pain on the left greater than the right.  Patient reports she is premedicated for today's visit.      Medical History:   Past Medical History:   Diagnosis Date    Atrophic vaginitis     Hypertension     Hypertriglyceridemia     Hypothyroidism     Obesity        Surgical History:   Fatuma Akins  has a past surgical history that includes Excision basal cell carcinoma and Colonoscopy (N/A, 8/1/2019).    Medications:   Fatuma has a current medication list which includes the following prescription(s): ascorbate calcium, ascorbic acid/multivit-min, calcium-vitamin d3, cholecalciferol (vitamin d3), coq10 (ubiquinol), fish oil-omega-3 fatty acids, glucosam/chond-msm1/c/zari/bor, hydrochlorothiazide, levothyroxine, magnesium citrate, multivitamin with minerals, naproxen, turmeric, and vit a/c/e ac/znox/cupric oxide.    Allergies:   Review of patient's allergies indicates:  No Known Allergies     Imaging:  Shoulder X-Ray on 6/9/2020   FINDINGS:  AC and glenohumeral joints are intact.  No fracture or dislocation.  Soft tissues appear normal.    Prior Therapy: N/A, had chiropractic adjustment before a trip to decrease shoulder pain, but it didn't really help  Social History: Patient lives alone  Occupation: Patient is retired from [email or web address]  Prior Level of Function: Independent with all activities of daily living  Current Level of Function: Patient reports difficulty cleaning pots and gardening due to pain in shoulders    Pain:  Current 0/10, worst 5/10, best 0/10   Location: left greater than right shoulder  Description: Burning, Shooting and feels like a rubber band stretching  Aggravating Factors: Lifting and using the shoulder to lift things away from the shoulder  Easing Factors: Stopping the activity that aggravates it, using the other arm to help    Dominant Extremity: Right    Pts goals: Pt reported goals are to be able to lift straight out.    OBJECTIVE   (x = not tested due to pain and/or inability to obtain test position)    RANGE OF MOTION:    Shoulder ROM Right  6/16/2020 Left  6/16/2020 Pain/Dysfunction with Movement Goal   Shoulder Flexion (180) 180 155  165 LUE   Shoulder Extension (60) - -     Shoulder Abduction (180) 140 140     Shoulder ER (90) 100 105     Shoulder IR (70) 40 45       STRENGTH:    U/E MMT Right  6/16/2020 Left  6/16/2020 Pain/Dysfunction with Movement Goal   Shoulder Flexion 4+/5 4+/5 Pain on left 5/5 B   Shoulder Extension 4/5 4/5  5/5 B   Shoulder Abduction 4+/5 4/5 Pain on left 5/5 B   Shoulder Adduction 5/5 5/5  5/5 B   Shoulder IR  4+/5   4+/5  5/5 B   Shoulder ER   4+/5 4/5  5/5 B   Middle Trapezius   4+/5 4/5 Pain on left 5/5 B   Lower Trapezius 4+/5 4/5  5/5 B   Elbow Flexion  4/5 4/5 Pain on left 5/5 B     MUSCLE LENGTH:     Muscle Tested  Right  6/16/2020 Left   6/16/2020 Goal   Upper Trapezius  normal normal Normal B    Levator Scapulae  normal normal Normal B    Pectoralis Minor  decreased decreased Normal B   Pectoralis Major decreased decreased Normal B     JOINT MOBILITY:     Joint Motion Tested Right  (spine)  6/16/2020 Left   6/16/2020 Goal   Glenohumeral anterior glide Normal Normal Normal B   Glenohumeral posterior glide Normal Normal Normal B   Glenohumeral inferior glide Normal Normal Normal B     SPECIAL TESTS:     Right  (spine)  6/16/2020 Left   6/16/2020 Goal   Impingement sign Positive Positive Negative B      Sensation:  Sensation is intact to light touch    Palpation: Increased tone and tenderness noted with palpation of left infraspinatus . Increased tenderness noted with palpation of bilateral supraspinatus and biceps tendons.     Posture:  Pt presents with postural abnormalities which include: rounded shoulders     Movement Analysis: Unremarkable     FUNCTION:     CMS Impairment/Limitation/Restriction for FOTO Shoulder Survey    Therapist reviewed FOTO scores for Fatuma Akins on 6/16/2020.   FOTO documents entered into EPIC - see Media section.    Limitation Score: 39%         TREATMENT   Treatment Time In: 1025  Treatment Time Out: 1030  Total Treatment time separate from Evaluation: 5 minutes    Fatuma received therapeutic exercises to develop posture for 5 minutes including:    Exercise 6/16/2020   Scapular set 10 x 10 seconds                               x = exercise details same as prior session    Home Exercises and Patient Education Provided    Education/Self-Care provided:   Patient educated on the impairments noted above and the effects of physical therapy intervention to improve overall condition and QOL.     Home Exercises Provided: yes.  Exercises were reviewed and Fatuma was able to demonstrate them prior to the end of the session.  Fatuma demonstrated good  understanding of the education provided.     See EMR under Patient Instructions for exercises provided 6/16/2020.    ASSESSMENT   Fatuma is a 60 y.o. female referred to outpatient  "Physical Therapy with a medical diagnosis of chronic bilateral shoulder pain. Pt presents with impairments including: decreased ROM, decreased strength and decreased muscle length.    Pt prognosis is Good.   Pt will benefit from skilled outpatient Physical Therapy to address the deficits stated above and in the chart below, provide pt/family education, and to maximize pt's level of independence.     Plan of care discussed with patient: Yes  Pt's spiritual, cultural and educational needs considered and patient is agreeable to the plan of care and goals as stated below:     Anticipated Barriers for therapy: co-morbidities, chronicity of condition, repetitive injury    Medical Necessity is demonstrated by the following  History  Co-morbidities and personal factors that may impact the plan of care Co-morbidities:   high BMI and HTN    Personal Factors:   no deficits     low   Examination  Body Structures and Functions, activity limitations and participation restrictions that may impact the plan of care Body Regions:   upper extremities    Body Systems:    ROM  strength    Participation Restrictions:   See above in "Current Level of Function"     Activity limitations:   Learning and applying knowledge  no deficits    General Tasks and Commands  no deficits    Communication  no deficits    Mobility  lifting and carrying objects    Self care  washing oneself (bathing, drying, washing hands)    Domestic Life  cooking  doing house work (cleaning house, washing dishes, laundry)    Interactions/Relationships  no deficits    Life Areas  no deficits    Community and Social Life  community life  recreation and leisure         moderate   Clinical Presentation stable and uncomplicated low   Decision Making/ Complexity Score: low       GOALS:    Short Term Goals:  4 weeks    1. Pain: Pt will demonstrate improved pain by reports of less than or equal to 4/10 worst pain on the verbal rating scale in order to progress toward maximal " functional ability and improve QOL.    2. Function: Patient will demonstrate improved function as indicated by a functional status score of less than or equal to 37 out of 100 on FOTO.    3. Mobility: Patient will improve AROM to 50% of stated goals, listed in objective measures above, in order to progress towards independence with functional activities.     4. Strength: Patient will improve strength to 50% of stated goals, listed in objective measures above, in order to progress towards independence with functional activities.     5. HEP: Patient will demonstrate independence with HEP in order to progress toward functional independence.      Long Term Goals:  8 weeks    1. Pain: Pt will demonstrate improved pain by reports of less than or equal to 3/10 worst pain on the verbal rating scale in order to progress toward maximal functional ability and improve QOL.      2. Function: Patient will demonstrate improved function as indicated by a functional status score of less than or equal to 35 out of 100 on FOTO.    3. Mobility: Patient will improve AROM to stated goals, listed in objective measures above, in order to return to maximal functional potential and improve quality of life.    4. Strength: Patient will improve strength to stated goals, listed in objective measures above, in order to improve functional independence and quality of life.    5. Patient will return to normal ADL's, IADL's, community involvement, recreational activities, and work-related activities with less than or equal to 3/10 pain and maximal function.         PLAN   Plan of care Certification: 6/16/2020 to 8/15/2020.    Outpatient Physical Therapy 2 times weekly for 8 weeks to include any combination of the following interventions: dry needling, modalities, electrical stimulation (IFC, Pre-Mod, Attended with Functional Dry Needling), Manual Therapy, Neuromuscular Re-ed, Patient Education, Self Care, Therapeutic Activites and Therapeutic  Exercise     Thank you for this referral.    These services are reasonable and necessary for the conditions set forth above while under my care.    Rae Jacobs, PT, DPT, CSCS, PPCES

## 2020-06-23 ENCOUNTER — CLINICAL SUPPORT (OUTPATIENT)
Dept: REHABILITATION | Facility: HOSPITAL | Age: 60
End: 2020-06-23
Payer: COMMERCIAL

## 2020-06-23 DIAGNOSIS — M25.512 CHRONIC PAIN OF BOTH SHOULDERS: ICD-10-CM

## 2020-06-23 DIAGNOSIS — G89.29 CHRONIC PAIN OF BOTH SHOULDERS: ICD-10-CM

## 2020-06-23 DIAGNOSIS — M25.511 CHRONIC PAIN OF BOTH SHOULDERS: ICD-10-CM

## 2020-06-23 PROCEDURE — 97110 THERAPEUTIC EXERCISES: CPT | Performed by: PHYSICAL THERAPIST

## 2020-06-23 PROCEDURE — 97140 MANUAL THERAPY 1/> REGIONS: CPT | Performed by: PHYSICAL THERAPIST

## 2020-06-23 NOTE — PROGRESS NOTES
"    Physical Therapy Daily Treatment Note     Name: Fatuma Akins  Clinic Number: [id number]    Therapy Diagnosis:   Encounter Diagnosis   Name Primary?    Chronic pain of both shoulders      Physician: Mercy Fitzpatrick MD    Visit Date: 6/23/2020    Physician Orders: PT Eval and Treat  Medical Diagnosis from Referral: Chronic pain of both shoulders  Evaluation Date: 6/16/2020  Authorization Period Expiration: 8/15/2020  Plan of Care Expiration: 8/15/2020  Visit # / Visits authorized: 2/ 20     Precautions: Standard    Time In: 1245  Time Out: 1330  Total Billable Time: 45 minutes    SUBJECTIVE     Pt reports: she is feeling better.  She was compliant with home exercise program.  Response to previous treatment: Decrease in pain symptoms   Functional change: No changes    Pain: 7/10 when reaching the left arm across her chest  Location: left greater than right shoulder      TREATMENT     Fatuma received therapeutic exercises to develop flexibility and posture for 30 minutes including:    Exercise 6/23/2020       Upper body ergometer with verbal cues for symmetrical scapular rhythm 5 minutes       Prone scapular set 10 x 10 "       Pectoralis stretch over half foam X 3 '       Overhead wand activity with scapular rhythm facilitation 20 x        Upper trapezius stretch 3 x 30 "       Seated low V isometric 10 x 10 "       Ball roll out lat stretch 10 x 10 "               X = exercise details same as prior session  * = exercise added to HEP     Fatuma received the following manual therapy techniques: Myofacial release and Soft tissue Mobilization were applied to the inferior and lateral borders of the scapula, upper trapezius muscles, levator scapula muscles, thoracic and cervical paraspinal muscles as well as the latissimus dorsi for 15 minutes in prone positioning.    Home Exercises Provided and Patient Education Provided     Education provided:    Patient educated on the impairments noted above and the effects of " physical therapy intervention to improve overall condition and QOL.    Patient educated on biomechanical justification for therapeutic exercise and importance of compliance with HEP in order to improve overall impairments and QOL    Patient educated on postural awareness and the use of a lumbar roll when in a seated position to reduce stress and maintain optimal alignment of the spine.    Patient educated on proper ergonomics at the work station in order to maintain optimal alignment of the musculoskeletal system and improve efficiency in the work environment.   Patient educated on the importance of improved core and hip strength in order to improve alignment of the spine and lower extremities with static positions and dynamic movement.    Patient educated on the importance of strong core and lower extremity musculature in order to improve both static and dynamic balance, improve gait mechanics, reduce fall risk and improve household and community mobility.     Written Home Exercises Provided: yes.  Exercises were reviewed and Fatuma was able to demonstrate them prior to the end of the session.  Fatuma demonstrated good  understanding of the education provided.     See EMR under Patient Instructions for exercises provided 6/23/2020.    ASSESSMENT   Patient had adhesions along the left greater than right scapula, which improved with manual therapy.  She had decreased pain with overhead flexion following myofascial release of the latissimus dorsi.     Fatuma is progressing well towards her goals.   Pt prognosis is Good.     Pt will continue to benefit from skilled outpatient physical therapy to address the deficits listed in the problem list box on initial evaluation, provide pt/family education and to maximize pt's level of independence in the home and community environment.     Pt's spiritual, cultural and educational needs considered and pt agreeable to plan of care and goals.     Anticipated Barriers for  therapy: co-morbidities, chronicity of condition, repetitive injury     Goals:   Short Term Goals:  4 weeks     1. Pain: Pt will demonstrate improved pain by reports of less than or equal to 4/10 worst pain on the verbal rating scale in order to progress toward maximal functional ability and improve QOL.     2. Function: Patient will demonstrate improved function as indicated by a functional status score of less than or equal to 37 out of 100 on FOTO.     3. Mobility: Patient will improve AROM to 50% of stated goals, listed in objective measures above, in order to progress towards independence with functional activities.      4. Strength: Patient will improve strength to 50% of stated goals, listed in objective measures above, in order to progress towards independence with functional activities.      5. HEP: Patient will demonstrate independence with HEP in order to progress toward functional independence.        Long Term Goals:  8 weeks     1. Pain: Pt will demonstrate improved pain by reports of less than or equal to 3/10 worst pain on the verbal rating scale in order to progress toward maximal functional ability and improve QOL.       2. Function: Patient will demonstrate improved function as indicated by a functional status score of less than or equal to 35 out of 100 on FOTO.     3. Mobility: Patient will improve AROM to stated goals, listed in objective measures above, in order to return to maximal functional potential and improve quality of life.     4. Strength: Patient will improve strength to stated goals, listed in objective measures above, in order to improve functional independence and quality of life.     5. Patient will return to normal ADL's, IADL's, community involvement, recreational activities, and work-related activities with less than or equal to 3/10 pain and maximal function.           PLAN   Continue Plan of Care (POC) and progress per patient tolerance.    Rae Jacobs, PT, DPT, CSCS,  PPCES

## 2020-06-25 ENCOUNTER — HOSPITAL ENCOUNTER (OUTPATIENT)
Dept: RADIOLOGY | Facility: HOSPITAL | Age: 60
Discharge: HOME | End: 2020-06-25
Attending: FAMILY MEDICINE
Payer: COMMERCIAL

## 2020-06-25 ENCOUNTER — TELEPHONE (OUTPATIENT)
Dept: FAMILY MEDICINE | Facility: CLINIC | Age: 60
End: 2020-06-25

## 2020-06-25 VITALS — BODY MASS INDEX: 36.1 KG/M2 | WEIGHT: 183.88 LBS | HEIGHT: 60 IN

## 2020-06-25 DIAGNOSIS — Z12.31 ENCOUNTER FOR SCREENING MAMMOGRAM FOR MALIGNANT NEOPLASM OF BREAST: ICD-10-CM

## 2020-06-25 DIAGNOSIS — M65.332 TRIGGER MIDDLE FINGER OF LEFT HAND: Primary | ICD-10-CM

## 2020-06-25 PROCEDURE — 77067 SCR MAMMO BI INCL CAD: CPT | Mod: TC

## 2020-06-25 PROCEDURE — 77067 MAMMO DIGITAL SCREENING BILAT WITH TOMOSYNTHESIS_CAD: ICD-10-PCS | Mod: 26,,, | Performed by: RADIOLOGY

## 2020-06-25 PROCEDURE — 77067 SCR MAMMO BI INCL CAD: CPT | Mod: 26,,, | Performed by: RADIOLOGY

## 2020-06-25 PROCEDURE — 77063 MAMMO DIGITAL SCREENING BILAT WITH TOMOSYNTHESIS_CAD: ICD-10-PCS | Mod: 26,,, | Performed by: RADIOLOGY

## 2020-06-25 PROCEDURE — 77063 BREAST TOMOSYNTHESIS BI: CPT | Mod: 26,,, | Performed by: RADIOLOGY

## 2020-06-25 NOTE — TELEPHONE ENCOUNTER
----- Message from Rae Jacobs PT sent at 6/25/2020  8:49 AM CDT -----  Regarding: RE: Trigger Finger  Awesome!  She will need a new order for OT for her trigger finger.    Thanks so much!    Rae Jacobs PT, DPT, CSCS, PPCES  ----- Message -----  From: Mercy Fitzpatrick MD  Sent: 6/24/2020   4:29 PM CDT  To: Rae Jacobs PT  Subject: RE: Trigger Finger                               That's fine with me.  Dr. Fitzpatrick  ----- Message -----  From: Rae Jacobs PT  Sent: 6/24/2020   4:02 PM CDT  To: Mercy Fitzpatrick MD  Subject: Trigger Finger                                   Hi Dr. Fitzpatrick,    I'm seeing Ms. Burris for physical therapy for her shoulder pain.  She mentioned in our evaluation that she also has trigger finger and would like to see our Occupational Therapist.  What're your thoughts on this?    Thanks so much,    Rae Jacobs, PT, DPT, CSCS, PPCES

## 2020-06-26 ENCOUNTER — CLINICAL SUPPORT (OUTPATIENT)
Dept: REHABILITATION | Facility: HOSPITAL | Age: 60
End: 2020-06-26
Payer: COMMERCIAL

## 2020-06-26 DIAGNOSIS — G89.29 CHRONIC PAIN OF BOTH SHOULDERS: ICD-10-CM

## 2020-06-26 DIAGNOSIS — M25.511 CHRONIC PAIN OF BOTH SHOULDERS: ICD-10-CM

## 2020-06-26 DIAGNOSIS — M25.512 CHRONIC PAIN OF BOTH SHOULDERS: ICD-10-CM

## 2020-06-26 PROCEDURE — 97110 THERAPEUTIC EXERCISES: CPT | Performed by: PHYSICAL THERAPIST

## 2020-06-26 PROCEDURE — 97140 MANUAL THERAPY 1/> REGIONS: CPT | Performed by: PHYSICAL THERAPIST

## 2020-06-26 NOTE — PROGRESS NOTES
"    Physical Therapy Daily Treatment Note     Name: Fatuma Akins  Clinic Number: [id number]    Therapy Diagnosis:   Encounter Diagnosis   Name Primary?    Chronic pain of both shoulders      Physician: Mercy Fitzpatrick MD    Visit Date: 6/26/2020    Physician Orders: PT Eval and Treat  Medical Diagnosis from Referral: Chronic pain of both shoulders  Evaluation Date: 6/16/2020  Authorization Period Expiration: 8/15/2020  Plan of Care Expiration: 8/15/2020  Visit # / Visits authorized: 3/ 20     Precautions: Standard    Time In: 1330  Time Out: 1415  Total Billable Time: 45 minutes    SUBJECTIVE     Pt reports: she is feeling better.  She was compliant with home exercise program.  Response to previous treatment: Decrease in pain symptoms   Functional change: No changes    Pain: 7/10 when reaching the left arm across her chest  Location: left greater than right shoulder      TREATMENT     Fatuma received therapeutic exercises to develop flexibility and posture for 30 minutes including:    Exercise 6/23/2020 6/26/2020      Upper body ergometer with verbal cues for symmetrical scapular rhythm 5 minutes deferred      Prone scapular set 10 x 10 " x      Prone row  2 x 10 each      Pectoralis stretch over half foam X 3 ' x      Overhead wand activity with scapular rhythm facilitation 20 x        Upper trapezius stretch 3 x 30 "       Seated low V isometric 10 x 10 " x      Ball roll out lat stretch 10 x 10 " x      Shoulder extension yellow theraband  2 x 10       Antonio external rotation yellow theraband  2 x 10      Pectoralis minor stretch  90 seconds                      X = exercise details same as prior session  * = exercise added to HEP     Fatuma received the following manual therapy techniques: Myofacial release and Soft tissue Mobilization were applied to the inferior and lateral borders of the scapula, upper trapezius muscles, levator scapula muscles, thoracic and cervical paraspinal muscles as well as the " latissimus dorsi for 15 minutes in supine positioning.    Home Exercises Provided and Patient Education Provided     Education provided:    Patient educated on the impairments noted above and the effects of physical therapy intervention to improve overall condition and QOL.    Patient educated on biomechanical justification for therapeutic exercise and importance of compliance with HEP in order to improve overall impairments and QOL    Patient educated on postural awareness and the use of a lumbar roll when in a seated position to reduce stress and maintain optimal alignment of the spine.    Patient educated on proper ergonomics at the work station in order to maintain optimal alignment of the musculoskeletal system and improve efficiency in the work environment.   Patient educated on the importance of improved core and hip strength in order to improve alignment of the spine and lower extremities with static positions and dynamic movement.    Patient educated on the importance of strong core and lower extremity musculature in order to improve both static and dynamic balance, improve gait mechanics, reduce fall risk and improve household and community mobility.     Written Home Exercises Provided: yes.  Exercises were reviewed and Fatuma was able to demonstrate them prior to the end of the session.  Fatuma demonstrated good  understanding of the education provided.     See EMR under Patient Instructions for exercises provided 6/23/2020.    ASSESSMENT   Patient fatigued with theraband strengthening activities, but had no increase in pain symptoms.    Fatuma is progressing well towards her goals.   Pt prognosis is Good.     Pt will continue to benefit from skilled outpatient physical therapy to address the deficits listed in the problem list box on initial evaluation, provide pt/family education and to maximize pt's level of independence in the home and community environment.     Pt's spiritual, cultural and  educational needs considered and pt agreeable to plan of care and goals.     Anticipated Barriers for therapy: co-morbidities, chronicity of condition, repetitive injury     Goals:   Short Term Goals:  4 weeks     1. Pain: Pt will demonstrate improved pain by reports of less than or equal to 4/10 worst pain on the verbal rating scale in order to progress toward maximal functional ability and improve QOL.     2. Function: Patient will demonstrate improved function as indicated by a functional status score of less than or equal to 37 out of 100 on FOTO.     3. Mobility: Patient will improve AROM to 50% of stated goals, listed in objective measures above, in order to progress towards independence with functional activities.      4. Strength: Patient will improve strength to 50% of stated goals, listed in objective measures above, in order to progress towards independence with functional activities.      5. HEP: Patient will demonstrate independence with HEP in order to progress toward functional independence.        Long Term Goals:  8 weeks     1. Pain: Pt will demonstrate improved pain by reports of less than or equal to 3/10 worst pain on the verbal rating scale in order to progress toward maximal functional ability and improve QOL.       2. Function: Patient will demonstrate improved function as indicated by a functional status score of less than or equal to 35 out of 100 on FOTO.     3. Mobility: Patient will improve AROM to stated goals, listed in objective measures above, in order to return to maximal functional potential and improve quality of life.     4. Strength: Patient will improve strength to stated goals, listed in objective measures above, in order to improve functional independence and quality of life.     5. Patient will return to normal ADL's, IADL's, community involvement, recreational activities, and work-related activities with less than or equal to 3/10 pain and maximal function.           PLAN    Continue Plan of Care (POC) and progress per patient tolerance.    Rae Jacobs, PT, DPT, CSCS, PPCES

## 2020-06-30 ENCOUNTER — CLINICAL SUPPORT (OUTPATIENT)
Dept: REHABILITATION | Facility: HOSPITAL | Age: 60
End: 2020-06-30
Payer: COMMERCIAL

## 2020-06-30 DIAGNOSIS — M25.511 CHRONIC PAIN OF BOTH SHOULDERS: ICD-10-CM

## 2020-06-30 DIAGNOSIS — G89.29 CHRONIC PAIN OF BOTH SHOULDERS: ICD-10-CM

## 2020-06-30 DIAGNOSIS — M25.512 CHRONIC PAIN OF BOTH SHOULDERS: ICD-10-CM

## 2020-06-30 PROCEDURE — 97140 MANUAL THERAPY 1/> REGIONS: CPT | Performed by: PHYSICAL THERAPIST

## 2020-06-30 PROCEDURE — 97110 THERAPEUTIC EXERCISES: CPT | Performed by: PHYSICAL THERAPIST

## 2020-06-30 NOTE — PROGRESS NOTES
"      Physical Therapy Daily Treatment Note     Name: Fatuma Akins  Clinic Number: [id number]    Therapy Diagnosis:   Encounter Diagnosis   Name Primary?    Chronic pain of both shoulders      Physician: Mercy Fitzpatrick MD    Visit Date: 6/30/2020    Physician Orders: PT Eval and Treat  Medical Diagnosis from Referral: Chronic pain of both shoulders  Evaluation Date: 6/16/2020  Authorization Period Expiration: 8/15/2020  Plan of Care Expiration: 8/15/2020  Visit # / Visits authorized: 4/ 20     Precautions: Standard    Time In: 0949  Time Out: 1030  Total Billable Time: 41 minutes    SUBJECTIVE     Pt reports: she had increased pain when swatting at a bug on Saturday.  She was compliant with home exercise program.  Response to previous treatment: Decrease in pain symptoms   Functional change: Patient reports she is able to don her seatbelt with less pain    Pain: 5/10 when reaching the left arm across her chest  Location: left greater than right shoulder      TREATMENT     Fatuma received therapeutic exercises to develop flexibility and posture for 30 minutes including:    Exercise 6/23/2020 6/26/2020 6/30/2020     Upper body ergometer with verbal cues for symmetrical scapular rhythm 5 minutes deferred      Prone scapular set 10 x 10 " x x     Prone row  2 x 10 each      Pectoralis stretch over half foam X 3 ' x x     [place] over foam roll   20 x     Overhead wand activity with scapular rhythm facilitation 20 x        Upper trapezius stretch 3 x 30 "       Seated low V isometric 10 x 10 " x      Ball roll out lat stretch 10 x 10 " x      Foam roll rotator cuff stretch   10 x 10 "     Shoulder extension yellow theraband  2 x 10  x     Shoulder flexion yellow theraband   2 x 10     Shoulder internal rotation yellow theraband   2 x 10     Shoulder external rotation yellow theraband   2 x 10     Antonio external rotation yellow theraband  2 x 10      Pectoralis minor stretch  90 seconds      Sidelying external rotation "   2 x 10 LUE             X = exercise details same as prior session  * = exercise added to HEP     Fatuma received the following manual therapy techniques: Myofacial release and Soft tissue Mobilization were applied to the inferior and lateral borders of the scapula, upper trapezius muscles, levator scapula muscles, thoracic and cervical paraspinal muscles as well as the latissimus dorsi for 15 minutes in supine positioning.    Home Exercises Provided and Patient Education Provided     Education provided:    Patient educated on the impairments noted above and the effects of physical therapy intervention to improve overall condition and QOL.    Patient educated on biomechanical justification for therapeutic exercise and importance of compliance with HEP in order to improve overall impairments and QOL    Patient educated on postural awareness and the use of a lumbar roll when in a seated position to reduce stress and maintain optimal alignment of the spine.    Patient educated on proper ergonomics at the work station in order to maintain optimal alignment of the musculoskeletal system and improve efficiency in the work environment.   Patient educated on the importance of improved core and hip strength in order to improve alignment of the spine and lower extremities with static positions and dynamic movement.    Patient educated on the importance of strong core and lower extremity musculature in order to improve both static and dynamic balance, improve gait mechanics, reduce fall risk and improve household and community mobility.     Written Home Exercises Provided: yes.  Exercises were reviewed and Fatuma was able to demonstrate them prior to the end of the session.  Fatuma demonstrated good  understanding of the education provided.     See EMR under Patient Instructions for exercises provided 6/23/2020.    ASSESSMENT   Patient had impingement pain with shoulder strengthening, which resolved with facilitation of  scapular stabilization.  Tension of the left upper trapezius decreased with manual therapy.    Fatuma is progressing well towards her goals.   Pt prognosis is Good.     Pt will continue to benefit from skilled outpatient physical therapy to address the deficits listed in the problem list box on initial evaluation, provide pt/family education and to maximize pt's level of independence in the home and community environment.     Pt's spiritual, cultural and educational needs considered and pt agreeable to plan of care and goals.     Anticipated Barriers for therapy: co-morbidities, chronicity of condition, repetitive injury     Goals:   Short Term Goals:  4 weeks     1. Pain: Pt will demonstrate improved pain by reports of less than or equal to 4/10 worst pain on the verbal rating scale in order to progress toward maximal functional ability and improve QOL.     2. Function: Patient will demonstrate improved function as indicated by a functional status score of less than or equal to 37 out of 100 on FOTO.     3. Mobility: Patient will improve AROM to 50% of stated goals, listed in objective measures above, in order to progress towards independence with functional activities.      4. Strength: Patient will improve strength to 50% of stated goals, listed in objective measures above, in order to progress towards independence with functional activities.      5. HEP: Patient will demonstrate independence with HEP in order to progress toward functional independence.        Long Term Goals:  8 weeks     1. Pain: Pt will demonstrate improved pain by reports of less than or equal to 3/10 worst pain on the verbal rating scale in order to progress toward maximal functional ability and improve QOL.       2. Function: Patient will demonstrate improved function as indicated by a functional status score of less than or equal to 35 out of 100 on FOTO.     3. Mobility: Patient will improve AROM to stated goals, listed in objective  measures above, in order to return to maximal functional potential and improve quality of life.     4. Strength: Patient will improve strength to stated goals, listed in objective measures above, in order to improve functional independence and quality of life.     5. Patient will return to normal ADL's, IADL's, community involvement, recreational activities, and work-related activities with less than or equal to 3/10 pain and maximal function.           PLAN   Continue Plan of Care (POC) and progress per patient tolerance.    Rae Jacobs, PT, DPT, CSCS, PPCES

## 2020-07-02 ENCOUNTER — CLINICAL SUPPORT (OUTPATIENT)
Dept: REHABILITATION | Facility: HOSPITAL | Age: 60
End: 2020-07-02
Payer: COMMERCIAL

## 2020-07-02 DIAGNOSIS — M25.511 CHRONIC PAIN OF BOTH SHOULDERS: ICD-10-CM

## 2020-07-02 DIAGNOSIS — M25.512 CHRONIC PAIN OF BOTH SHOULDERS: ICD-10-CM

## 2020-07-02 DIAGNOSIS — G89.29 CHRONIC PAIN OF BOTH SHOULDERS: ICD-10-CM

## 2020-07-02 PROCEDURE — 97110 THERAPEUTIC EXERCISES: CPT

## 2020-07-02 NOTE — PROGRESS NOTES
"      Physical Therapy Daily Treatment Note     Name: Fautma Akins  Clinic Number: [id number]    Therapy Diagnosis:   Encounter Diagnosis   Name Primary?    Chronic pain of both shoulders      Physician: Mercy Fitzpatrick MD    Visit Date: 7/2/2020    Physician Orders: PT Eval and Treat  Medical Diagnosis from Referral: Chronic pain of both shoulders  Evaluation Date: 6/16/2020  Authorization Period Expiration: 8/15/2020  Plan of Care Expiration: 8/15/2020  Visit # / Visits authorized: 5/ 20     Precautions: Standard    Time In:10:48 am  Time Out: 11:30 am  Total Billable Time: 42 minutes    SUBJECTIVE     Pt reports: she has not been as compliant with her HEP as she has been trying to work out at the gym with similar exercises. Pt used pain meds today to help with inflammation.   She was compliant with home exercise program.  Response to previous treatment: Decrease in pain symptoms   Functional change: Patient reports pain reaching behind the back or showering    Pre Pain: 1/10 when reaching the left arm across her chest; Post pain: 1/10  Location: left greater than right shoulder      TREATMENT     Fatuma received therapeutic exercises to develop flexibility and posture for 42 minutes including:    Exercise 6/23/2020 6/26/2020 6/30/2020 7/2/2020    Upper body ergometer with verbal cues for symmetrical scapular rhythm 5 minutes deferred      Prone scapular set 10 x 10 " x x     Prone row  2 x 10 each      Pectoralis stretch over half foam X 3 ' x x     [place] over foam roll   20 x     Overhead wand activity with scapular rhythm facilitation 20 x        Upper trapezius stretch 3 x 30 "       Seated low V isometric 10 x 10 " x      Ball roll out lat stretch 10 x 10 " x      Foam roll rotator cuff stretch   10 x 10 "     Shoulder extension yellow theraband  2 x 10  x 2x10 with PPT    Shoulder flexion yellow theraband   2 x 10     Shoulder internal rotation yellow theraband   2 x 10 2x10 B PPT    Shoulder external " rotation yellow theraband   2 x 10 2x10 B PPT    Antonio external rotation yellow theraband  2 x 10      Pectoralis minor stretch  90 seconds      Sidelying external rotation   2 x 10 LUE  2x10 B 2#    Prone T    2x12; 1# 2x8    Prone W    2x10    Prone shoulder retraction     2x10 B    Prone shoulder extension    1# 2x10 B                    Supine shoulder alphabet    A-z 2#    X = exercise details same as prior session  * = exercise added to HEP     Fatuma received the following manual therapy techniques: Myofacial release and Soft tissue Mobilization were applied to the inferior and lateral borders of the scapula, upper trapezius muscles, levator scapula muscles, thoracic and cervical paraspinal muscles as well as the latissimus dorsi for 0 minutes in supine positioning.    Home Exercises Provided and Patient Education Provided     Education provided:    Patient educated on the impairments noted above and the effects of physical therapy intervention to improve overall condition and QOL.    Patient educated on biomechanical justification for therapeutic exercise and importance of compliance with HEP in order to improve overall impairments and QOL    Patient educated on postural awareness and the use of a lumbar roll when in a seated position to reduce stress and maintain optimal alignment of the spine.    Patient educated on proper ergonomics at the work station in order to maintain optimal alignment of the musculoskeletal system and improve efficiency in the work environment.   Patient educated on the importance of improved core and hip strength in order to improve alignment of the spine and lower extremities with static positions and dynamic movement.    Patient educated on the importance of strong core and lower extremity musculature in order to improve both static and dynamic balance, improve gait mechanics, reduce fall risk and improve household and community mobility.     Written Home Exercises Provided:  yes.  Exercises were reviewed and Fatuma was able to demonstrate them prior to the end of the session.  Fatuma demonstrated good  understanding of the education provided.     See EMR under Patient Instructions for exercises provided 7/1/2020.    ASSESSMENT   Patient had impingement pain with shoulder strengthening, which resolved with facilitation of scapular stabilization. PT added more core support to help reduce her tendency to rotated/extend on the spine with shoulder exercises today but moderate assistance was needed to direct her core. PT worked on quality of therex to facilitate oblique and rectus firing as well as scapular support. Pt required moderate assistance to faciliate proper muscle recruitment.    Fatuma is progressing well towards her goals.   Pt prognosis is Good.     Pt will continue to benefit from skilled outpatient physical therapy to address the deficits listed in the problem list box on initial evaluation, provide pt/family education and to maximize pt's level of independence in the home and community environment.     Pt's spiritual, cultural and educational needs considered and pt agreeable to plan of care and goals.     Anticipated Barriers for therapy: co-morbidities, chronicity of condition, repetitive injury     Goals:   Short Term Goals:  4 weeks     1. Pain: Pt will demonstrate improved pain by reports of less than or equal to 4/10 worst pain on the verbal rating scale in order to progress toward maximal functional ability and improve QOL.     2. Function: Patient will demonstrate improved function as indicated by a functional status score of less than or equal to 37 out of 100 on FOTO.     3. Mobility: Patient will improve AROM to 50% of stated goals, listed in objective measures above, in order to progress towards independence with functional activities.      4. Strength: Patient will improve strength to 50% of stated goals, listed in objective measures above, in order to progress  towards independence with functional activities.      5. HEP: Patient will demonstrate independence with HEP in order to progress toward functional independence.        Long Term Goals:  8 weeks     1. Pain: Pt will demonstrate improved pain by reports of less than or equal to 3/10 worst pain on the verbal rating scale in order to progress toward maximal functional ability and improve QOL.       2. Function: Patient will demonstrate improved function as indicated by a functional status score of less than or equal to 35 out of 100 on FOTO.     3. Mobility: Patient will improve AROM to stated goals, listed in objective measures above, in order to return to maximal functional potential and improve quality of life.     4. Strength: Patient will improve strength to stated goals, listed in objective measures above, in order to improve functional independence and quality of life.     5. Patient will return to normal ADL's, IADL's, community involvement, recreational activities, and work-related activities with less than or equal to 3/10 pain and maximal function.           PLAN   Continue Plan of Care (POC) and progress per patient tolerance.    Tran Chapman, PT, DPT, CSCS, PPCES

## 2020-07-07 ENCOUNTER — CLINICAL SUPPORT (OUTPATIENT)
Dept: REHABILITATION | Facility: HOSPITAL | Age: 60
End: 2020-07-07
Payer: COMMERCIAL

## 2020-07-07 DIAGNOSIS — G89.29 CHRONIC PAIN OF BOTH SHOULDERS: ICD-10-CM

## 2020-07-07 DIAGNOSIS — M25.511 CHRONIC PAIN OF BOTH SHOULDERS: ICD-10-CM

## 2020-07-07 DIAGNOSIS — M25.512 CHRONIC PAIN OF BOTH SHOULDERS: ICD-10-CM

## 2020-07-07 PROCEDURE — 97110 THERAPEUTIC EXERCISES: CPT | Performed by: PHYSICAL THERAPIST

## 2020-07-07 PROCEDURE — 97140 MANUAL THERAPY 1/> REGIONS: CPT | Performed by: PHYSICAL THERAPIST

## 2020-07-07 NOTE — PROGRESS NOTES
"  Physical Therapy Daily Treatment Note     Name: Fatuma Akins  Clinic Number: [id number]    Therapy Diagnosis:   Encounter Diagnosis   Name Primary?    Chronic pain of both shoulders      Physician: Mercy Fitzpatrick MD    Visit Date: 7/7/2020    Physician Orders: PT Eval and Treat  Medical Diagnosis from Referral: Chronic pain of both shoulders  Evaluation Date: 6/16/2020  Authorization Period Expiration: 8/15/2020  Plan of Care Expiration: 8/15/2020  Visit # / Visits authorized: 6/ 20     Precautions: Standard    Time In: 0949 am  Time Out: 1030 am  Total Billable Time: 41 minutes    SUBJECTIVE     Pt reports: she is feeling okay today. Patient used pain medications today to help with inflammation.   She was compliant with home exercise program.  Response to previous treatment: Decrease in pain symptoms   Functional change: Patient reports pain reaching behind the back or across to her opposite shoulder while showering    Pre Pain: 1/10 when reaching the left arm across her chest; Post pain: 1/10  Location: left greater than right shoulder      TREATMENT     Fatuma received therapeutic exercises to develop flexibility and posture for 30 minutes including:    Exercise 6/23/2020 6/26/2020 6/30/2020 7/2/2020 7/7/2020   Upper body ergometer with verbal cues for symmetrical scapular rhythm 5 minutes deferred   2.5 minutes fwd, 2.5 minutes bkwd   Prone scapular set 10 x 10 " x x  x   Prone row  2 x 10 each      Pectoralis stretch over half foam X 3 ' x x  x   [place] over foam roll   20 x  On flat surface   Overhead wand activity with scapular rhythm facilitation 20 x        Upper trapezius stretch 3 x 30 "       Seated low V isometric 10 x 10 " x      Ball roll out lat stretch 10 x 10 " x      Foam roll rotator cuff stretch   10 x 10 "     Shoulder row yellow theraband     2 x 10   Shoulder extension yellow theraband  2 x 10  x 2x10 with PPT x   Shoulder flexion yellow theraband   2 x 10  x   Shoulder internal " rotation yellow theraband   2 x 10 2x10 B PPT ---   Shoulder external rotation yellow theraband   2 x 10 2x10 B PPT X isometric   Antonio external rotation yellow theraband  2 x 10   x   Pectoralis minor stretch  90 seconds      Sidelying external rotation   2 x 10 LUE  2x10 B 2# ---   Prone T    2x12; 1# 2x8 ---   Prone W    2x10 ---   Prone shoulder retraction     2x10 B    Prone shoulder extension    1# 2x10 B ---                   Supine shoulder alphabet    A-z 2# ---   X = exercise details same as prior session  * = exercise added to HEP     Fatuma received the following manual therapy techniques: Myofacial release and Soft tissue Mobilization were applied to the left upper trapezius muscles, levator scapula muscles, and pectoralis major muscle for 11 minutes in supine positioning.    Home Exercises Provided and Patient Education Provided     Education provided:    Patient educated on the impairments noted above and the effects of physical therapy intervention to improve overall condition and QOL.    Patient educated on biomechanical justification for therapeutic exercise and importance of compliance with HEP in order to improve overall impairments and QOL    Patient educated on postural awareness and the use of a lumbar roll when in a seated position to reduce stress and maintain optimal alignment of the spine.    Patient educated on proper ergonomics at the work station in order to maintain optimal alignment of the musculoskeletal system and improve efficiency in the work environment.   Patient educated on the importance of improved core and hip strength in order to improve alignment of the spine and lower extremities with static positions and dynamic movement.    Patient educated on the importance of strong core and lower extremity musculature in order to improve both static and dynamic balance, improve gait mechanics, reduce fall risk and improve household and community mobility.     Written Home  Exercises Provided: yes.  Exercises were reviewed and Fatuma was able to demonstrate them prior to the end of the session.  Fatuma demonstrated good  understanding of the education provided.     See EMR under Patient Instructions for exercises provided 7/1/2020.    ASSESSMENT   Patient had impingement pain with shoulder strengthening, which resolved with myofascial release of the left pectoralis major muscle.  Patient fatigued with theraband strengthening in painfree range.      Fatuma is progressing well towards her goals.   Pt prognosis is Good.     Pt will continue to benefit from skilled outpatient physical therapy to address the deficits listed in the problem list box on initial evaluation, provide pt/family education and to maximize pt's level of independence in the home and community environment.     Pt's spiritual, cultural and educational needs considered and pt agreeable to plan of care and goals.     Anticipated Barriers for therapy: co-morbidities, chronicity of condition, repetitive injury     Goals:   Short Term Goals:  4 weeks     1. Pain: Pt will demonstrate improved pain by reports of less than or equal to 4/10 worst pain on the verbal rating scale in order to progress toward maximal functional ability and improve QOL.     2. Function: Patient will demonstrate improved function as indicated by a functional status score of less than or equal to 37 out of 100 on FOTO.     3. Mobility: Patient will improve AROM to 50% of stated goals, listed in objective measures above, in order to progress towards independence with functional activities.      4. Strength: Patient will improve strength to 50% of stated goals, listed in objective measures above, in order to progress towards independence with functional activities.      5. HEP: Patient will demonstrate independence with HEP in order to progress toward functional independence.        Long Term Goals:  8 weeks     1. Pain: Pt will demonstrate improved pain by  reports of less than or equal to 3/10 worst pain on the verbal rating scale in order to progress toward maximal functional ability and improve QOL.       2. Function: Patient will demonstrate improved function as indicated by a functional status score of less than or equal to 35 out of 100 on FOTO.     3. Mobility: Patient will improve AROM to stated goals, listed in objective measures above, in order to return to maximal functional potential and improve quality of life.     4. Strength: Patient will improve strength to stated goals, listed in objective measures above, in order to improve functional independence and quality of life.     5. Patient will return to normal ADL's, IADL's, community involvement, recreational activities, and work-related activities with less than or equal to 3/10 pain and maximal function.           PLAN   Continue Plan of Care (POC) and progress per patient tolerance.    Rae Jacobs, PT, DPT, CSCS, PPCES

## 2020-07-09 ENCOUNTER — CLINICAL SUPPORT (OUTPATIENT)
Dept: REHABILITATION | Facility: HOSPITAL | Age: 60
End: 2020-07-09
Payer: COMMERCIAL

## 2020-07-09 DIAGNOSIS — M25.511 CHRONIC PAIN OF BOTH SHOULDERS: ICD-10-CM

## 2020-07-09 DIAGNOSIS — G89.29 CHRONIC PAIN OF BOTH SHOULDERS: ICD-10-CM

## 2020-07-09 DIAGNOSIS — M25.512 CHRONIC PAIN OF BOTH SHOULDERS: ICD-10-CM

## 2020-07-09 PROCEDURE — 97110 THERAPEUTIC EXERCISES: CPT | Performed by: PHYSICAL THERAPIST

## 2020-07-09 NOTE — PROGRESS NOTES
"  Physical Therapy Daily Treatment Note     Name: Fatuma Akins  Clinic Number: [id number]    Therapy Diagnosis:   Encounter Diagnosis   Name Primary?    Chronic pain of both shoulders      Physician: Mercy Fitzpatrick MD    Visit Date: 7/9/2020    Physician Orders: PT Eval and Treat  Medical Diagnosis from Referral: Chronic pain of both shoulders  Evaluation Date: 6/16/2020  Authorization Period Expiration: 8/15/2020  Plan of Care Expiration: 8/15/2020  Visit # / Visits authorized: 7/ 20     Precautions: Standard    Time In: 1417  Time Out: 1502  Total Billable Time: 40 minutes    SUBJECTIVE     Pt reports: she is doing well today, but has pain when reaching for her seatbelt. Patient used pain medications today to help with inflammation.   She was compliant with home exercise program.  Response to previous treatment: Decrease in pain symptoms   Functional change: Patient reports less pain with some shoulder movements.    Pre Pain: 0/10 when reaching the left arm across her chest; Post pain: 0/10  Location: left greater than right shoulder      TREATMENT     Fatuma received therapeutic exercises to develop flexibility and posture for 40 minutes including:    Exercise 6/23/2020 6/26/2020 6/30/2020 7/2/2020 7/7/2020 7/9/2020   Upper body ergometer with verbal cues for symmetrical scapular rhythm 5 minutes deferred   2.5 minutes fwd, 2.5 minutes bkwd x   External rotation stretch at wall      3 x 30 seconds each   Wand extension stretch      10 x 10 seconds    Wand extension/IR/adduction stretch      10 x 10 seconds each   Prone scapular set 10 x 10 " x x  x x   Prone row  2 x 10 each       Pectoralis stretch over half foam X 3 ' x x  x    [place] over foam roll   20 x  On flat surface    Overhead wand activity with scapular rhythm facilitation 20 x         Upper trapezius stretch 3 x 30 "        Seated low V isometric 10 x 10 " x       Ball roll out lat stretch 10 x 10 " x       Foam roll rotator cuff stretch   10 x 10 " ""      Shoulder row yellow theraband     2 x 10 x   Shoulder extension yellow theraband  2 x 10  x 2x10 with PPT x    Shoulder flexion yellow theraband   2 x 10  x    Shoulder internal rotation yellow theraband   2 x 10 2x10 B PPT ---    Shoulder external rotation yellow theraband   2 x 10 2x10 B PPT X isometric    Antonio external rotation yellow theraband  2 x 10   x    Pectoralis minor stretch  90 seconds       Sidelying external rotation   2 x 10 LUE  2x10 B 2# --- 2 x 10   Sidelying horizontal abduction      2 x 10   Prone T    2x12; 1# 2x8 ---    Prone W    2x10 --- Deferred secondary to pain   Prone shoulder retraction     2x10 B     Prone shoulder extension    1# 2x10 B --- x   Ball on wall flexion       5 x 5" pulse   Body blade stabilization with scapular set at neutral      3 x 30" each   Supine shoulder alphabet    A-z 2# ---    X = exercise details same as prior session  * = exercise added to HEP     Fatuma received the following manual therapy techniques: Myofacial release and Soft tissue Mobilization were applied to the left upper trapezius muscles, levator scapula muscles, and pectoralis major muscle for 0 minutes in supine positioning.    Home Exercises Provided and Patient Education Provided     Education provided:    Patient educated on the impairments noted above and the effects of physical therapy intervention to improve overall condition and QOL.    Patient educated on biomechanical justification for therapeutic exercise and importance of compliance with HEP in order to improve overall impairments and QOL    Patient educated on postural awareness and the use of a lumbar roll when in a seated position to reduce stress and maintain optimal alignment of the spine.    Patient educated on proper ergonomics at the work station in order to maintain optimal alignment of the musculoskeletal system and improve efficiency in the work environment.   Patient educated on the importance of improved core and " hip strength in order to improve alignment of the spine and lower extremities with static positions and dynamic movement.    Patient educated on the importance of strong core and lower extremity musculature in order to improve both static and dynamic balance, improve gait mechanics, reduce fall risk and improve household and community mobility.     Written Home Exercises Provided: yes.  Exercises were reviewed and Fatuma was able to demonstrate them prior to the end of the session.  Fatuma demonstrated good  understanding of the education provided.     See EMR under Patient Instructions for exercises provided 7/1/2020.    ASSESSMENT   Patient had impingement pain initially with shoulder external rotation strengthening, which resolved with tactile cues.  Patient had no increase in pain symptoms with therapeutic exercises.    Fatuma is progressing well towards her goals.   Pt prognosis is Good.     Pt will continue to benefit from skilled outpatient physical therapy to address the deficits listed in the problem list box on initial evaluation, provide pt/family education and to maximize pt's level of independence in the home and community environment.     Pt's spiritual, cultural and educational needs considered and pt agreeable to plan of care and goals.     Anticipated Barriers for therapy: co-morbidities, chronicity of condition, repetitive injury     Goals:   Short Term Goals:  4 weeks     1. Pain: Pt will demonstrate improved pain by reports of less than or equal to 4/10 worst pain on the verbal rating scale in order to progress toward maximal functional ability and improve QOL.     2. Function: Patient will demonstrate improved function as indicated by a functional status score of less than or equal to 37 out of 100 on FOTO.     3. Mobility: Patient will improve AROM to 50% of stated goals, listed in objective measures above, in order to progress towards independence with functional activities.      4. Strength:  Patient will improve strength to 50% of stated goals, listed in objective measures above, in order to progress towards independence with functional activities.      5. HEP: Patient will demonstrate independence with HEP in order to progress toward functional independence.        Long Term Goals:  8 weeks     1. Pain: Pt will demonstrate improved pain by reports of less than or equal to 3/10 worst pain on the verbal rating scale in order to progress toward maximal functional ability and improve QOL.       2. Function: Patient will demonstrate improved function as indicated by a functional status score of less than or equal to 35 out of 100 on FOTO.     3. Mobility: Patient will improve AROM to stated goals, listed in objective measures above, in order to return to maximal functional potential and improve quality of life.     4. Strength: Patient will improve strength to stated goals, listed in objective measures above, in order to improve functional independence and quality of life.     5. Patient will return to normal ADL's, IADL's, community involvement, recreational activities, and work-related activities with less than or equal to 3/10 pain and maximal function.           PLAN   Continue Plan of Care (POC) and progress per patient tolerance.    Rae Jacobs, PT, DPT, CSCS, PPCES

## 2020-07-14 ENCOUNTER — CLINICAL SUPPORT (OUTPATIENT)
Dept: REHABILITATION | Facility: HOSPITAL | Age: 60
End: 2020-07-14
Payer: COMMERCIAL

## 2020-07-14 DIAGNOSIS — G89.29 CHRONIC PAIN OF BOTH SHOULDERS: ICD-10-CM

## 2020-07-14 DIAGNOSIS — M25.511 CHRONIC PAIN OF BOTH SHOULDERS: ICD-10-CM

## 2020-07-14 DIAGNOSIS — M25.512 CHRONIC PAIN OF BOTH SHOULDERS: ICD-10-CM

## 2020-07-14 PROCEDURE — 97110 THERAPEUTIC EXERCISES: CPT | Performed by: PHYSICAL THERAPIST

## 2020-07-14 NOTE — PROGRESS NOTES
"  Physical Therapy Daily Treatment Note     Name: Fatuma Akins  Clinic Number: [id number]    Therapy Diagnosis:   Encounter Diagnosis   Name Primary?    Chronic pain of both shoulders      Physician: Mercy Fitzpatrick MD    Visit Date: 7/14/2020    Physician Orders: PT Eval and Treat  Medical Diagnosis from Referral: Chronic pain of both shoulders  Evaluation Date: 6/16/2020  Authorization Period Expiration: 8/15/2020  Plan of Care Expiration: 8/15/2020  Visit # / Visits authorized: 7/ 20     Precautions: Standard    Time In: 1418  Time Out: 1502  Total Billable Time: 44 minutes    SUBJECTIVE     Pt reports: she is doing well today.   She was compliant with home exercise program.  Response to previous treatment: Decrease in pain symptoms   Functional change: Patient reports she is able to reach her seatbelt without increase in pain.    Pre Pain: 0/10 when reaching the left arm across her chest; Post pain: 0/10  Location: left greater than right shoulder      TREATMENT     Fatuma received therapeutic exercises to develop flexibility and posture for 40 minutes including:    Exercise 6/23/2020 6/26/2020 6/30/2020 7/2/2020 7/7/2020 7/9/2020 7/14/2020   Upper body ergometer with verbal cues for symmetrical scapular rhythm 5 minutes deferred   2.5 minutes fwd, 2.5 minutes bkwd x x   External rotation stretch at wall      3 x 30 seconds each ---   Wand extension stretch      10 x 10 seconds  X internal rotation   Wand extension/IR/adduction stretch      10 x 10 seconds each    Functional internal rotation stretch with strap       10 x 10 seconds LT   Prone scapular set 10 x 10 " x x  x x x   Prone row  2 x 10 each        Pectoralis stretch over half foam X 3 ' x x  x  x   Hamlet over foam roll   20 x  On flat surface     Overhead wand activity with scapular rhythm facilitation 20 x          Upper trapezius stretch 3 x 30 "      x   Seated low V isometric 10 x 10 " x        Ball roll out lat stretch 10 x 10 " x        Foam " "roll rotator cuff stretch   10 x 10 "       Shoulder row yellow theraband     2 x 10 x    Shoulder extension yellow theraband  2 x 10  x 2x10 with PPT x     Shoulder flexion yellow theraband   2 x 10  x     Shoulder internal rotation yellow theraband   2 x 10 2x10 B PPT ---     Shoulder external rotation yellow theraband   2 x 10 2x10 B PPT X isometric     Antonio external rotation yellow theraband  2 x 10   x     Pectoralis minor stretch  90 seconds        Sidelying external rotation   2 x 10 LUE  2x10 B 2# --- 2 x 10 x   Sidelying horizontal abduction      2 x 10 x   Sidelying shoulder abduction       2 x 10   Prone T    2x12; 1# 2x8 ---     Prone W    2x10 --- Deferred secondary to pain    Prone shoulder retraction     2x10 B      Prone shoulder extension    1# 2x10 B --- x x   Ball on wall flexion       5 x 5" pulse x   Body blade stabilization with scapular set at neutral      3 x 30" each x   Supine shoulder alphabet    A-z 2# ---     X = exercise details same as prior session  * = exercise added to HEP     Fatuma received the following manual therapy techniques: Myofacial release and Soft tissue Mobilization were applied to the left upper trapezius muscles, levator scapula muscles, and pectoralis major muscle for 0 minutes in supine positioning.    Home Exercises Provided and Patient Education Provided     Education provided:    Patient educated on the impairments noted above and the effects of physical therapy intervention to improve overall condition and QOL.    Patient educated on biomechanical justification for therapeutic exercise and importance of compliance with HEP in order to improve overall impairments and QOL    Patient educated on postural awareness and the use of a lumbar roll when in a seated position to reduce stress and maintain optimal alignment of the spine.    Patient educated on proper ergonomics at the work station in order to maintain optimal alignment of the musculoskeletal system and " improve efficiency in the work environment.   Patient educated on the importance of improved core and hip strength in order to improve alignment of the spine and lower extremities with static positions and dynamic movement.    Patient educated on the importance of strong core and lower extremity musculature in order to improve both static and dynamic balance, improve gait mechanics, reduce fall risk and improve household and community mobility.     Written Home Exercises Provided: yes.  Exercises were reviewed and Fatuma was able to demonstrate them prior to the end of the session.  Fatuma demonstrated good  understanding of the education provided.     See EMR under Patient Instructions for exercises provided 7/1/2020.    ASSESSMENT   Patient tolerated exercises well today with good fatigue noted.    Fatuma is progressing well towards her goals.   Pt prognosis is Good.     Pt will continue to benefit from skilled outpatient physical therapy to address the deficits listed in the problem list box on initial evaluation, provide pt/family education and to maximize pt's level of independence in the home and community environment.     Pt's spiritual, cultural and educational needs considered and pt agreeable to plan of care and goals.     Anticipated Barriers for therapy: co-morbidities, chronicity of condition, repetitive injury     Goals:   Short Term Goals:  4 weeks     1. Pain: Pt will demonstrate improved pain by reports of less than or equal to 4/10 worst pain on the verbal rating scale in order to progress toward maximal functional ability and improve QOL.  Met 7/14/2020   2. Function: Patient will demonstrate improved function as indicated by a functional status score of less than or equal to 37 out of 100 on FOTO.  Progressing, 7/14/2020   3. Mobility: Patient will improve AROM to 50% of stated goals, listed in objective measures above, in order to progress towards independence with functional activities.   Met  7/14/2020   4. Strength: Patient will improve strength to 50% of stated goals, listed in objective measures above, in order to progress towards independence with functional activities.   Met 7/14/2020   5. HEP: Patient will demonstrate independence with HEP in order to progress toward functional independence.  Met 7/14/2020      Long Term Goals:  8 weeks     1. Pain: Pt will demonstrate improved pain by reports of less than or equal to 3/10 worst pain on the verbal rating scale in order to progress toward maximal functional ability and improve QOL.       2. Function: Patient will demonstrate improved function as indicated by a functional status score of less than or equal to 35 out of 100 on FOTO.     3. Mobility: Patient will improve AROM to stated goals, listed in objective measures above, in order to return to maximal functional potential and improve quality of life.     4. Strength: Patient will improve strength to stated goals, listed in objective measures above, in order to improve functional independence and quality of life.     5. Patient will return to normal ADL's, IADL's, community involvement, recreational activities, and work-related activities with less than or equal to 3/10 pain and maximal function.           PLAN   Continue Plan of Care (POC) and progress per patient tolerance.    Rae Jacobs, PT, DPT, CSCS, PPCES

## 2020-07-15 ENCOUNTER — PATIENT OUTREACH (OUTPATIENT)
Dept: OTHER | Facility: OTHER | Age: 60
End: 2020-07-15

## 2020-07-16 ENCOUNTER — CLINICAL SUPPORT (OUTPATIENT)
Dept: REHABILITATION | Facility: HOSPITAL | Age: 60
End: 2020-07-16
Payer: COMMERCIAL

## 2020-07-16 DIAGNOSIS — G89.29 CHRONIC PAIN OF BOTH SHOULDERS: ICD-10-CM

## 2020-07-16 DIAGNOSIS — M25.512 CHRONIC PAIN OF BOTH SHOULDERS: ICD-10-CM

## 2020-07-16 DIAGNOSIS — M25.511 CHRONIC PAIN OF BOTH SHOULDERS: ICD-10-CM

## 2020-07-16 PROCEDURE — 97110 THERAPEUTIC EXERCISES: CPT | Performed by: PHYSICAL THERAPIST

## 2020-07-16 NOTE — PROGRESS NOTES
"  Physical Therapy Daily Treatment Note     Name: Fatuma Akins  Clinic Number: [id number]    Therapy Diagnosis: Chronic pain of both shoulders  Physician: Mercy Fitzpatrick MD    Visit Date: 7/16/2020    Physician Orders: PT Eval and Treat  Medical Diagnosis from Referral: Chronic pain of both shoulders  Evaluation Date: 6/16/2020  Authorization Period Expiration: 8/15/2020  Plan of Care Expiration: 8/15/2020  Visit # / Visits authorized: 8/ 20     Precautions: Standard    Time In: 1415  Time Out: 1500  Total Billable Time:  45 minutes    SUBJECTIVE     Pt reports: she had a bad day yesterday and is having more shoulder pain today.   She was compliant with home exercise program.  Response to previous treatment: Decrease in pain symptoms and increased strength overrall  Functional change: Patient reports she is able to reach her seatbelt without increase in pain.    Pre Pain: 0/10 when reaching the left arm across her chest; Post pain: 0/10  Location: left greater than right shoulder      TREATMENT     Fatuma received therapeutic exercises to develop flexibility and posture for 45 minutes including:    Exercise 6/23/2020 6/26/2020 6/30/2020 7/2/2020 7/7/2020 7/9/2020 7/14/2020 7/16/2020   Upper body ergometer with verbal cues for symmetrical scapular rhythm 5 minutes deferred   2.5 minutes fwd, 2.5 minutes bkwd x x 3 minutes fwd, 3 minutes bkwd   External rotation stretch at wall      3 x 30 seconds each ---    Wand extension stretch      10 x 10 seconds  X internal rotation x   Wand extension/IR/adduction stretch      10 x 10 seconds each  x   Functional internal rotation stretch with strap       10 x 10 seconds LT x   Prone scapular set 10 x 10 " x x  x x x x   Prone row  2 x 10 each         Pectoralis stretch over half foam X 3 ' x x  x  x x   Sultan over foam roll   20 x  On flat surface      Overhead wand activity with scapular rhythm facilitation 20 x           Upper trapezius stretch 3 x 30 "      x x   Seated " "low V isometric 10 x 10 " x         Ball roll out lat stretch 10 x 10 " x         Foam roll rotator cuff stretch   10 x 10 "        Shoulder row yellow theraband     2 x 10 x  x   Shoulder extension yellow theraband  2 x 10  x 2x10 with PPT x   x   Shoulder flexion yellow theraband   2 x 10  x   x   Shoulder internal rotation yellow theraband   2 x 10 2x10 B PPT ---   x   Shoulder external rotation yellow theraband   2 x 10 2x10 B PPT X isometric   x   Antonio external rotation yellow theraband  2 x 10   x      Pectoralis minor stretch  90 seconds         Sidelying external rotation   2 x 10 LUE  2x10 B 2# --- 2 x 10 x x   Sidelying horizontal abduction      2 x 10 x x   Sidelying shoulder abduction       2 x 10 x   Prone T    2x12; 1# 2x8 ---      Prone W    2x10 --- Deferred secondary to pain     Prone shoulder retraction     2x10 B       Prone shoulder extension    1# 2x10 B --- x x x   Ball on wall flexion       5 x 5" pulse x x   Body blade stabilization with scapular set at neutral      3 x 30" each x x   Supine shoulder alphabet    A-z 2# ---      Shoulder external rotation stretch with wand        10 x 10 seconds Lt   X = exercise details same as prior session  * = exercise added to HEP     Home Exercises Provided and Patient Education Provided     Education provided:    Patient educated on the impairments noted above and the effects of physical therapy intervention to improve overall condition and QOL.    Patient educated on biomechanical justification for therapeutic exercise and importance of compliance with HEP in order to improve overall impairments and QOL    Patient educated on postural awareness and the use of a lumbar roll when in a seated position to reduce stress and maintain optimal alignment of the spine.    Patient educated on proper ergonomics at the work station in order to maintain optimal alignment of the musculoskeletal system and improve efficiency in the work environment.   Patient " educated on the importance of improved core and hip strength in order to improve alignment of the spine and lower extremities with static positions and dynamic movement.    Patient educated on the importance of strong core and lower extremity musculature in order to improve both static and dynamic balance, improve gait mechanics, reduce fall risk and improve household and community mobility.     Written Home Exercises Provided: yes.  Exercises were reviewed and Fatuma was able to demonstrate them prior to the end of the session.  Fatuma demonstrated good  understanding of the education provided.     See EMR under Patient Instructions for exercises provided 7/1/2020.    ASSESSMENT   Patient had pain in left shoulder with sidelying external rotation, which improved with tactile cues.  She fatigued with strengthening activities..    Fatuma is progressing well towards her goals.   Pt prognosis is Good.     Pt will continue to benefit from skilled outpatient physical therapy to address the deficits listed in the problem list box on initial evaluation, provide pt/family education and to maximize pt's level of independence in the home and community environment.     Pt's spiritual, cultural and educational needs considered and pt agreeable to plan of care and goals.     Anticipated Barriers for therapy: co-morbidities, chronicity of condition, repetitive injury     Goals:   Short Term Goals:  4 weeks     1. Pain: Pt will demonstrate improved pain by reports of less than or equal to 4/10 worst pain on the verbal rating scale in order to progress toward maximal functional ability and improve QOL.  Met 7/14/2020   2. Function: Patient will demonstrate improved function as indicated by a functional status score of less than or equal to 37 out of 100 on FOTO.  Progressing, 7/14/2020   3. Mobility: Patient will improve AROM to 50% of stated goals, listed in objective measures above, in order to progress towards independence with  functional activities.   Met 7/14/2020   4. Strength: Patient will improve strength to 50% of stated goals, listed in objective measures above, in order to progress towards independence with functional activities.   Met 7/14/2020   5. HEP: Patient will demonstrate independence with HEP in order to progress toward functional independence.  Met 7/14/2020      Long Term Goals:  8 weeks     1. Pain: Pt will demonstrate improved pain by reports of less than or equal to 3/10 worst pain on the verbal rating scale in order to progress toward maximal functional ability and improve QOL.       2. Function: Patient will demonstrate improved function as indicated by a functional status score of less than or equal to 35 out of 100 on FOTO.     3. Mobility: Patient will improve AROM to stated goals, listed in objective measures above, in order to return to maximal functional potential and improve quality of life.     4. Strength: Patient will improve strength to stated goals, listed in objective measures above, in order to improve functional independence and quality of life.     5. Patient will return to normal ADL's, IADL's, community involvement, recreational activities, and work-related activities with less than or equal to 3/10 pain and maximal function.           PLAN   Continue Plan of Care (POC) and progress per patient tolerance.    Rae Jacobs, PT, DPT, CSCS, PPCES

## 2020-07-21 ENCOUNTER — CLINICAL SUPPORT (OUTPATIENT)
Dept: REHABILITATION | Facility: HOSPITAL | Age: 60
End: 2020-07-21
Payer: COMMERCIAL

## 2020-07-21 ENCOUNTER — PATIENT MESSAGE (OUTPATIENT)
Dept: FAMILY MEDICINE | Facility: CLINIC | Age: 60
End: 2020-07-21

## 2020-07-21 DIAGNOSIS — M25.511 CHRONIC PAIN OF BOTH SHOULDERS: ICD-10-CM

## 2020-07-21 DIAGNOSIS — G89.29 CHRONIC PAIN OF BOTH SHOULDERS: ICD-10-CM

## 2020-07-21 DIAGNOSIS — M25.512 CHRONIC PAIN OF BOTH SHOULDERS: ICD-10-CM

## 2020-07-21 PROCEDURE — 97530 THERAPEUTIC ACTIVITIES: CPT | Performed by: PHYSICAL THERAPIST

## 2020-07-21 NOTE — PROGRESS NOTES
"  Physical Therapy Daily Treatment Note     Name: Fatuma Akins  Clinic Number: [id number]    Therapy Diagnosis: Chronic pain of both shoulders  Physician: Mercy Fitzpatrick MD    Visit Date: 7/21/2020    Physician Orders: PT Eval and Treat  Medical Diagnosis from Referral: Chronic pain of both shoulders  Evaluation Date: 6/16/2020  Authorization Period Expiration: 8/15/2020  Plan of Care Expiration: 8/15/2020  Visit # / Visits authorized: 10/ 20     Precautions: Standard    Time In: 1415  Time Out: 1508  Total Billable Time:  53 minutes    SUBJECTIVE     Pt reports: she is feeling better today.   She was compliant with home exercise program.  Response to previous treatment: Decrease in pain symptoms and increased strength overrall  Functional change: Patient reports she is able to reach her seatbelt without increase in pain.    Pre Pain: 0/10 when reaching the left arm across her chest; Post pain: 0/10  Location: left greater than right shoulder      TREATMENT     Fatuma received therapeutic exercises to develop flexibility and posture for 53 minutes including:    Exercise 6/23/2020 6/26/2020 6/30/2020 7/2/2020 7/7/2020 7/9/2020 7/14/2020 7/16/2020 7/21/2020   Upper body ergometer with verbal cues for symmetrical scapular rhythm 5 minutes deferred   2.5 minutes fwd, 2.5 minutes bkwd x x 3 minutes fwd, 3 minutes bkwd x   External rotation stretch at wall      3 x 30 seconds each ---     Wand extension stretch      10 x 10 seconds  X internal rotation x    Wand extension/IR/adduction stretch      10 x 10 seconds each  x x   Functional internal rotation stretch with strap       10 x 10 seconds LT x x   Prone scapular set 10 x 10 " x x  x x x x x   Prone row  2 x 10 each          Pectoralis stretch over half foam X 3 ' x x  x  x x X foam roll   [place] over foam roll   20 x  On flat surface    X 1 minute   Serratus punch over foam roll         X 1 minute   Alternating shoulder flexion over foam roll         X 1 minute " "  Overhead wand activity with scapular rhythm facilitation 20 x            Upper trapezius stretch 3 x 30 "      x x    Seated low V isometric 10 x 10 " x          Ball roll out lat stretch 10 x 10 " x          Foam roll rotator cuff stretch   10 x 10 "         Shoulder row yellow theraband     2 x 10 x  x X red   Shoulder extension yellow theraband  2 x 10  x 2x10 with PPT x   x X red   Shoulder flexion yellow theraband   2 x 10  x   x X red   Shoulder internal rotation yellow theraband   2 x 10 2x10 B PPT ---   x    Shoulder external rotation yellow theraband   2 x 10 2x10 B PPT X isometric   x    Antonio external rotation yellow theraband  2 x 10   x       Pectoralis minor stretch  90 seconds          Sidelying external rotation   2 x 10 LUE  2x10 B 2# --- 2 x 10 x x X 1#   Sidelying horizontal abduction      2 x 10 x x X 1#   Sidelying shoulder abduction       2 x 10 x X 1#   Prone T    2x12; 1# 2x8 ---       Prone W    2x10 --- Deferred secondary to pain      Prone shoulder retraction     2x10 B        Prone shoulder extension    1# 2x10 B --- x x x    Ball on wall flexion       5 x 5" pulse x x x   Ball on wall abduction         5 x 5" pulse   Body blade stabilization with scapular set at neutral      3 x 30" each x x    Supine shoulder alphabet    A-z 2# ---       Shoulder external rotation stretch with wand        10 x 10 seconds Lt    Open book at wall         2 x 10 each                                                   X = exercise details same as prior session  * = exercise added to HEP     Home Exercises Provided and Patient Education Provided     Education provided:    Patient educated on the impairments noted above and the effects of physical therapy intervention to improve overall condition and QOL.    Patient educated on biomechanical justification for therapeutic exercise and importance of compliance with HEP in order to improve overall impairments and QOL    Patient educated on postural awareness " and the use of a lumbar roll when in a seated position to reduce stress and maintain optimal alignment of the spine.    Patient educated on proper ergonomics at the work station in order to maintain optimal alignment of the musculoskeletal system and improve efficiency in the work environment.   Patient educated on the importance of improved core and hip strength in order to improve alignment of the spine and lower extremities with static positions and dynamic movement.    Patient educated on the importance of strong core and lower extremity musculature in order to improve both static and dynamic balance, improve gait mechanics, reduce fall risk and improve household and community mobility.     Written Home Exercises Provided: yes.  Exercises were reviewed and Fatuma was able to demonstrate them prior to the end of the session.  Fatuma demonstrated good  understanding of the education provided.     See EMR under Patient Instructions for exercises provided 7/1/2020.    ASSESSMENT   Patient had good fatigue with strengthening progressions.  Modified yoga to reduce irritation on shoulder.    Fatuma is progressing well towards her goals.   Pt prognosis is Good.     Pt will continue to benefit from skilled outpatient physical therapy to address the deficits listed in the problem list box on initial evaluation, provide pt/family education and to maximize pt's level of independence in the home and community environment.     Pt's spiritual, cultural and educational needs considered and pt agreeable to plan of care and goals.     Anticipated Barriers for therapy: co-morbidities, chronicity of condition, repetitive injury     Goals:   Short Term Goals:  4 weeks     1. Pain: Pt will demonstrate improved pain by reports of less than or equal to 4/10 worst pain on the verbal rating scale in order to progress toward maximal functional ability and improve QOL.  Met 7/14/2020   2. Function: Patient will demonstrate improved  function as indicated by a functional status score of less than or equal to 37 out of 100 on FOTO.  Progressing, 7/14/2020   3. Mobility: Patient will improve AROM to 50% of stated goals, listed in objective measures above, in order to progress towards independence with functional activities.   Met 7/14/2020   4. Strength: Patient will improve strength to 50% of stated goals, listed in objective measures above, in order to progress towards independence with functional activities.   Met 7/14/2020   5. HEP: Patient will demonstrate independence with HEP in order to progress toward functional independence.  Met 7/14/2020      Long Term Goals:  8 weeks     1. Pain: Pt will demonstrate improved pain by reports of less than or equal to 3/10 worst pain on the verbal rating scale in order to progress toward maximal functional ability and improve QOL.       2. Function: Patient will demonstrate improved function as indicated by a functional status score of less than or equal to 35 out of 100 on FOTO.     3. Mobility: Patient will improve AROM to stated goals, listed in objective measures above, in order to return to maximal functional potential and improve quality of life.     4. Strength: Patient will improve strength to stated goals, listed in objective measures above, in order to improve functional independence and quality of life.     5. Patient will return to normal ADL's, IADL's, community involvement, recreational activities, and work-related activities with less than or equal to 3/10 pain and maximal function.           PLAN   Continue Plan of Care (POC) and progress per patient tolerance.    Rae Jacobs, PT, DPT, CSCS, PPCES

## 2020-07-22 ENCOUNTER — PATIENT MESSAGE (OUTPATIENT)
Dept: FAMILY MEDICINE | Facility: CLINIC | Age: 60
End: 2020-07-22

## 2020-07-22 RX ORDER — NAPROXEN 500 MG/1
500 TABLET ORAL 2 TIMES DAILY
Qty: 30 TABLET | Refills: 1 | Status: SHIPPED | OUTPATIENT
Start: 2020-07-22 | End: 2021-06-29

## 2020-07-24 ENCOUNTER — CLINICAL SUPPORT (OUTPATIENT)
Dept: REHABILITATION | Facility: HOSPITAL | Age: 60
End: 2020-07-24
Payer: COMMERCIAL

## 2020-07-24 DIAGNOSIS — G89.29 CHRONIC PAIN OF BOTH SHOULDERS: ICD-10-CM

## 2020-07-24 DIAGNOSIS — M25.512 CHRONIC PAIN OF BOTH SHOULDERS: ICD-10-CM

## 2020-07-24 DIAGNOSIS — M25.511 CHRONIC PAIN OF BOTH SHOULDERS: ICD-10-CM

## 2020-07-24 PROCEDURE — 97110 THERAPEUTIC EXERCISES: CPT | Mod: CQ

## 2020-07-24 NOTE — PROGRESS NOTES
"  Physical Therapy Daily Treatment Note     Name: Fatuma Akins  Clinic Number: [id number]    Therapy Diagnosis: Chronic pain of both shoulders  Physician: Mercy Fitzpatrick MD    Visit Date: 7/24/2020    Physician Orders: PT Eval and Treat  Medical Diagnosis from Referral: Chronic pain of both shoulders  Evaluation Date: 6/16/2020  Authorization Period Expiration: 8/15/2020  Plan of Care Expiration: 8/15/2020  Visit # / Visits authorized: 11/ 20     Precautions: Standard    Time In: 1:30 PM  Time Out: 2:15 PM  Total Billable Time:  40 minutes    SUBJECTIVE     Pt reports: she is feeling sore after bar exercise performed outside of therapy.   She was compliant with home exercise program.  Response to previous treatment: Patient states she felt looser after last session.   Functional change: Patient reports she has difficulty with bed mobility, and bathing right shoulder with left arm    Pre Pain: 4/10 when reaching the left arm across her chest; Post pain: 2/10  Location: left greater than right shoulder      TREATMENT     Fatuma received therapeutic exercises to develop flexibility and posture for 40 minutes including:    Exercise 6/23/2020 6/26/2020 6/30/2020 7/2/2020 7/7/2020 7/9/2020 7/14/2020 7/16/2020 7/21/2020 7/24/2020   Upper body ergometer with verbal cues for symmetrical scapular rhythm 5 minutes deferred   2.5 minutes fwd, 2.5 minutes bkwd x x 3 minutes fwd, 3 minutes bkwd x 3 minutes forward and backward   External rotation stretch at wall      3 x 30 seconds each ---      Wand extension stretch      10 x 10 seconds  X internal rotation x     Wand extension/IR/adduction stretch      10 x 10 seconds each  x x 10x10 second hold (no adduction)   Functional internal rotation stretch with strap       10 x 10 seconds LT x x    Prone scapular set 10 x 10 " x x  x x x x x    Prone row  2 x 10 each        2x10  Bilateral 2 pounds   Pectoralis stretch over half foam X 3 ' x x  x  x x X foam roll 1 minute   [place] " "over foam roll   20 x  On flat surface    X 1 minute 1 minute   Serratus punch over foam roll         X 1 minute 1 minute   Alternating shoulder flexion over foam roll         X 1 minute 1 minute   Overhead wand activity with scapular rhythm facilitation 20 x             Upper trapezius stretch 3 x 30 "      x x     Seated low V isometric 10 x 10 " x           Ball roll out lat stretch 10 x 10 " x        3x30 second hold bilaterally   Foam roll rotator cuff stretch   10 x 10 "          Shoulder row yellow theraband     2 x 10 x  x X red    Shoulder extension yellow theraband  2 x 10  x 2x10 with PPT x   x X red    Shoulder flexion yellow theraband   2 x 10  x   x X red    Shoulder internal rotation yellow theraband   2 x 10 2x10 B PPT ---   x     Shoulder external rotation yellow theraband   2 x 10 2x10 B PPT X isometric   x     Antonio external rotation yellow theraband  2 x 10   x        Pectoralis minor stretch  90 seconds           Sidelying external rotation   2 x 10 LUE  2x10 B 2# --- 2 x 10 x x X 1# 2x10 1 pound   Sidelying horizontal abduction      2 x 10 x x X 1#    Sidelying shoulder abduction       2 x 10 x X 1#    Prone T    2x12; 1# 2x8 ---     3x10 1 pound bilaterally   Prone W    2x10 --- Deferred secondary to pain    3x10 with lumbar extension   Prone shoulder retraction     2x10 B         Prone shoulder extension    1# 2x10 B --- x x x     Ball on wall flexion       5 x 5" pulse x x x    Ball on wall abduction         5 x 5" pulse    Body blade stabilization with scapular set at neutral      3 x 30" each x x     Supine shoulder alphabet    A-z 2# ---        Shoulder external rotation stretch with wand        10 x 10 seconds Lt     Open book at wall         2 x 10 each                                                        X = exercise details same as prior session  * = exercise added to HEP     Home Exercises Provided and Patient Education Provided     Education provided:    Patient educated on the " impairments noted above and the effects of physical therapy intervention to improve overall condition and QOL.    Patient educated on biomechanical justification for therapeutic exercise and importance of compliance with HEP in order to improve overall impairments and QOL    Patient educated on postural awareness and the use of a lumbar roll when in a seated position to reduce stress and maintain optimal alignment of the spine.    Patient educated on proper ergonomics at the work station in order to maintain optimal alignment of the musculoskeletal system and improve efficiency in the work environment.   Patient educated on the importance of improved core and hip strength in order to improve alignment of the spine and lower extremities with static positions and dynamic movement.    Patient educated on the importance of strong core and lower extremity musculature in order to improve both static and dynamic balance, improve gait mechanics, reduce fall risk and improve household and community mobility.    Instructed to perform all exercises with scapular stability.     Written Home Exercises Provided: yes.  Exercises were reviewed and Fatuma was able to demonstrate them prior to the end of the session.  Fatuma demonstrated good  understanding of the education provided.     See EMR under Patient Instructions for exercises provided 7/1/2020.    ASSESSMENT   Patient had more left shoulder fatigue compared to right prone T's, no significant pain with external rotation or abduction movement patterns. Modified prone T's to assist with decreasing pain with exercise.     Fatuma is progressing well towards her goals.   Pt prognosis is Good.     Pt will continue to benefit from skilled outpatient physical therapy to address the deficits listed in the problem list box on initial evaluation, provide pt/family education and to maximize pt's level of independence in the home and community environment.     Pt's spiritual, cultural  and educational needs considered and pt agreeable to plan of care and goals.     Anticipated Barriers for therapy: co-morbidities, chronicity of condition, repetitive injury     Goals:   Short Term Goals:  4 weeks     1. Pain: Pt will demonstrate improved pain by reports of less than or equal to 4/10 worst pain on the verbal rating scale in order to progress toward maximal functional ability and improve QOL.  Met 7/14/2020   2. Function: Patient will demonstrate improved function as indicated by a functional status score of less than or equal to 37 out of 100 on FOTO.  Progressing, 7/14/2020   3. Mobility: Patient will improve AROM to 50% of stated goals, listed in objective measures above, in order to progress towards independence with functional activities.   Met 7/14/2020   4. Strength: Patient will improve strength to 50% of stated goals, listed in objective measures above, in order to progress towards independence with functional activities.   Met 7/14/2020   5. HEP: Patient will demonstrate independence with HEP in order to progress toward functional independence.  Met 7/14/2020      Long Term Goals:  8 weeks     1. Pain: Pt will demonstrate improved pain by reports of less than or equal to 3/10 worst pain on the verbal rating scale in order to progress toward maximal functional ability and improve QOL.       2. Function: Patient will demonstrate improved function as indicated by a functional status score of less than or equal to 35 out of 100 on FOTO.     3. Mobility: Patient will improve AROM to stated goals, listed in objective measures above, in order to return to maximal functional potential and improve quality of life.     4. Strength: Patient will improve strength to stated goals, listed in objective measures above, in order to improve functional independence and quality of life.     5. Patient will return to normal ADL's, IADL's, community involvement, recreational activities, and work-related  activities with less than or equal to 3/10 pain and maximal function.           PLAN   Continue Plan of Care to strengthen scapular for patient to be able to perform all activities of daily living.     Gideon Richards, PTA

## 2020-07-28 ENCOUNTER — CLINICAL SUPPORT (OUTPATIENT)
Dept: REHABILITATION | Facility: HOSPITAL | Age: 60
End: 2020-07-28
Payer: COMMERCIAL

## 2020-07-28 DIAGNOSIS — M25.511 CHRONIC PAIN OF BOTH SHOULDERS: ICD-10-CM

## 2020-07-28 DIAGNOSIS — G89.29 CHRONIC PAIN OF BOTH SHOULDERS: ICD-10-CM

## 2020-07-28 DIAGNOSIS — M25.512 CHRONIC PAIN OF BOTH SHOULDERS: ICD-10-CM

## 2020-07-28 PROCEDURE — 97110 THERAPEUTIC EXERCISES: CPT

## 2020-07-28 NOTE — PROGRESS NOTES
Physical Therapy Daily Treatment Note and Discharge     Name: Fatuma Akins  Clinic Number: [id number]    Therapy Diagnosis: Chronic pain of both shoulders  Physician: Mercy Fitzpatrick MD    Visit Date: 7/28/2020     Date of Last visit: today  Total Visits Received: 13  Cancelled Visits: 1  No Show Visits: 0      Physician Orders: PT Eval and Treat  Medical Diagnosis from Referral: Chronic pain of both shoulders  Evaluation Date: 6/16/2020  Authorization Period Expiration: 8/15/2020  Plan of Care Expiration: 8/15/2020  Visit # / Visits authorized: 13/ 20     Precautions: Standard    Time In: 8:30 am  Time Out: 9:13 am  Total Billable Time:  43 minutes    SUBJECTIVE     Pt reports: she will be pursuing OT for hand rehab and wants to work on her shoulder care on her own for a while. L shoulder pain is still worse than the R but overall both are slowly improving.     She was compliant with home exercise program.  Response to previous treatment: Patient states she felt looser after last session.   Functional change: Patient reports she has difficulty with bed mobility, and bathing right shoulder with left arm    Pre Pain: 2/10 when reaching the left arm across her chest; Post pain: 2/10  Location: left greater than right shoulder      OBJECTIVE and TREATMENT     RANGE OF MOTION:     Shoulder ROM Right  6/16/2020 Left  6/16/2020 Pain/Dysfunction with Movement Goal   Shoulder Flexion (180) 180 155   165 LUE   Shoulder Extension (60) - -       Shoulder Abduction (180) 140 140       Shoulder ER (90) 100 105       Shoulder IR (70) 40 45          ROM Today     R L  Flexion      180 170  Extension    50 45   Abduction     165 165  Shoulder External Rotation  90  85 (at 0 and 90 degrees of abduction)  Shoulder Internal rotation  70 60    STRENGTH:     U/E MMT Right  6/16/2020 Left  6/16/2020 Pain/Dysfunction with Movement Goal  Today   Shoulder Flexion 4+/5 4+/5 Pain on left 5/5 B   Shoulder Extension 4/5 4/5   5/5 B    Shoulder Abduction 4+/5 4/5 Pain on left 5/5 B   Shoulder Adduction 5/5 5/5   5/5 B   Shoulder IR  4+/5    4+/5   5/5 B   Shoulder ER    4+/5 4/5   5/5 B   Middle Trapezius    4+/5 4/5 Pain on left 5/5 B   Lower Trapezius 4+/5 4/5   5/5 B   Elbow Flexion  4/5 4/5 Pain on left 5/5 B      Strength today was 5/5 on all but L shoulder internal rotation and EROM external rotation at 90 degrees but 5/5 on all on the R except the lower traps R 4+/5 and L 4/5.       SPECIAL TESTS:       Right  (spine)  6/16/2020 Left   6/16/2020 Goal Today Met   Impingement sign Positive Positive Negative B     FUNCTION:      CMS Impairment/Limitation/Restriction for FOTO Shoulder Survey     Therapist reviewed FOTO scores for Fatuma Akins on 6/16/2020.   FOTO documents entered into [employer] - see Media section.     Limitation Score: 61%          Fatuma received therapeutic exercises to develop flexibility and posture for 40 minutes including:    Seated external rotation (ER) with red theraband (RTB) B  Seated W red theraband B  Supine ER at 90 degrees with RTB B  Supine shoulder external rotation adding a Y with red theraband 2x10 B  sidelying shoulder abduction 1# 3x10 L  Standing lower trap squeeze (shoulder upside down Y with extension) 2x10  Standing rows RTB 2x10  Press up with red theraband and shoulder in external rotation standing 2x10      Home Exercises Provided and Patient Education Provided     Education provided:    Patient educated on the impairments noted above and the effects of physical therapy intervention to improve overall condition and QOL.    Patient educated on biomechanical justification for therapeutic exercise and importance of compliance with HEP in order to improve overall impairments and QOL    Patient educated on postural awareness and the use of a lumbar roll when in a seated position to reduce stress and maintain optimal alignment of the spine.    Patient educated on proper ergonomics at the work station  in order to maintain optimal alignment of the musculoskeletal system and improve efficiency in the work environment.   Patient educated on the importance of improved core and hip strength in order to improve alignment of the spine and lower extremities with static positions and dynamic movement.    Patient educated on the importance of strong core and lower extremity musculature in order to improve both static and dynamic balance, improve gait mechanics, reduce fall risk and improve household and community mobility.    Instructed to perform all exercises with scapular stability.     Written Home Exercises Provided: yes.  Exercises were reviewed and Fatuma was able to demonstrate them prior to the end of the session.  Fatuma demonstrated good  understanding of the education provided.     See EMR under Patient Instructions for exercises provided 7/28/2020    ASSESSMENT   Patient had more left shoulder fatigue compared to right with external rotation and overhead work today. Pt's L shoulder still mildly falls into internal rotation to guard the rotator cuff but it is advancing. PT upgraded her HEP and advised to slowly advance ROM and strength in stages during her reps to slowly begin recruiting her rotator cuff in various postures to teach good proprioception at home as she is still needing more rotator cuff support and lower trap support. Pt feels she will advance at home as instructed today and return at a later date if it doesn't resolve fully. Goals met except for lower traps still advancing and her FOTO score due to early discharge. Functionally she reports to PT a great change but it was not reflected on her FOTO score. PT to d/c at pt request.        Fatuma is progressing well towards her goals.   Pt prognosis is Good.     Pt will continue to benefit from skilled outpatient physical therapy to address the deficits listed in the problem list box on initial evaluation, provide pt/family education and to maximize  pt's level of independence in the home and community environment.     Pt's spiritual, cultural and educational needs considered and pt agreeable to plan of care and goals.     Anticipated Barriers for therapy: co-morbidities, chronicity of condition, repetitive injury     Goals:   Short Term Goals:  4 weeks     1. Pain: Pt will demonstrate improved pain by reports of less than or equal to 4/10 worst pain on the verbal rating scale in order to progress toward maximal functional ability and improve QOL.  Met 7/14/2020   2. Function: Patient will demonstrate improved function as indicated by a functional status score of less than or equal to 37 out of 100 on FOTO.  not met   3. Mobility: Patient will improve AROM to 50% of stated goals, listed in objective measures above, in order to progress towards independence with functional activities.   Met 7/14/2020   4. Strength: Patient will improve strength to 50% of stated goals, listed in objective measures above, in order to progress towards independence with functional activities.   Met 7/14/2020   5. HEP: Patient will demonstrate independence with HEP in order to progress toward functional independence.  Met 7/14/2020      Long Term Goals:  8 weeks     1. Pain: Pt will demonstrate improved pain by reports of less than or equal to 3/10 worst pain on the verbal rating scale in order to progress toward maximal functional ability and improve QOL.    Met   2. Function: Patient will demonstrate improved function as indicated by a functional status score of less than or equal to 35 out of 100 on FOTO.  not met   3. Mobility: Patient will improve AROM to stated goals, listed in objective measures above, in order to return to maximal functional potential and improve quality of life.  Met   4. Strength: Patient will improve strength to stated goals, listed in objective measures above, in order to improve functional independence and quality of life.  Not fully met   5. Patient will  return to normal ADL's, IADL's, community involvement, recreational activities, and work-related activities with less than or equal to 3/10 pain and maximal function.  Met         PLAN   D/C to HEP at pt request      Discharge reason: Patient requested discharge as going to begin OT for her hand  This patient is discharged from Physical Therapy    Tran Chapman, PT

## 2020-07-28 NOTE — PLAN OF CARE
Physical Therapy Daily Treatment Note and Discharge     Name: Fatuma Akins  Clinic Number: [id number]    Therapy Diagnosis: Chronic pain of both shoulders  Physician: Mercy Fitzpatrick MD    Visit Date: 7/28/2020     Date of Last visit: today  Total Visits Received: 13  Cancelled Visits: 1  No Show Visits: 0      Physician Orders: PT Eval and Treat  Medical Diagnosis from Referral: Chronic pain of both shoulders  Evaluation Date: 6/16/2020  Authorization Period Expiration: 8/15/2020  Plan of Care Expiration: 8/15/2020  Visit # / Visits authorized: 13/ 20     Precautions: Standard    Time In: 8:30 am  Time Out: 9:13 am  Total Billable Time:  43 minutes    SUBJECTIVE     Pt reports: she will be pursuing OT for hand rehab and wants to work on her shoulder care on her own for a while. L shoulder pain is still worse than the R but overall both are slowly improving.     She was compliant with home exercise program.  Response to previous treatment: Patient states she felt looser after last session.   Functional change: Patient reports she has difficulty with bed mobility, and bathing right shoulder with left arm    Pre Pain: 2/10 when reaching the left arm across her chest; Post pain: 2/10  Location: left greater than right shoulder      OBJECTIVE and TREATMENT     RANGE OF MOTION:     Shoulder ROM Right  6/16/2020 Left  6/16/2020 Pain/Dysfunction with Movement Goal   Shoulder Flexion (180) 180 155   165 LUE   Shoulder Extension (60) - -       Shoulder Abduction (180) 140 140       Shoulder ER (90) 100 105       Shoulder IR (70) 40 45          ROM Today     R L  Flexion      180 170  Extension    50 45   Abduction     165 165  Shoulder External Rotation  90  85 (at 0 and 90 degrees of abduction)  Shoulder Internal rotation  70 60    STRENGTH:     U/E MMT Right  6/16/2020 Left  6/16/2020 Pain/Dysfunction with Movement Goal  Today   Shoulder Flexion 4+/5 4+/5 Pain on left 5/5 B   Shoulder Extension 4/5 4/5   5/5 B    Shoulder Abduction 4+/5 4/5 Pain on left 5/5 B   Shoulder Adduction 5/5 5/5   5/5 B   Shoulder IR  4+/5    4+/5   5/5 B   Shoulder ER    4+/5 4/5   5/5 B   Middle Trapezius    4+/5 4/5 Pain on left 5/5 B   Lower Trapezius 4+/5 4/5   5/5 B   Elbow Flexion  4/5 4/5 Pain on left 5/5 B      Strength today was 5/5 on all but L shoulder internal rotation and EROM external rotation at 90 degrees but 5/5 on all on the R except the lower traps R 4+/5 and L 4/5.       SPECIAL TESTS:       Right  (spine)  6/16/2020 Left   6/16/2020 Goal Today Met   Impingement sign Positive Positive Negative B     FUNCTION:      CMS Impairment/Limitation/Restriction for FOTO Shoulder Survey     Therapist reviewed FOTO scores for Fatuma Akins on 6/16/2020.   FOTO documents entered into [employer] - see Media section.     Limitation Score: 61%          Fatuma received therapeutic exercises to develop flexibility and posture for 40 minutes including:    Seated external rotation (ER) with red theraband (RTB) B  Seated W red theraband B  Supine ER at 90 degrees with RTB B  Supine shoulder external rotation adding a Y with red theraband 2x10 B  sidelying shoulder abduction 1# 3x10 L  Standing lower trap squeeze (shoulder upside down Y with extension) 2x10  Standing rows RTB 2x10  Press up with red theraband and shoulder in external rotation standing 2x10      Home Exercises Provided and Patient Education Provided     Education provided:    Patient educated on the impairments noted above and the effects of physical therapy intervention to improve overall condition and QOL.    Patient educated on biomechanical justification for therapeutic exercise and importance of compliance with HEP in order to improve overall impairments and QOL    Patient educated on postural awareness and the use of a lumbar roll when in a seated position to reduce stress and maintain optimal alignment of the spine.    Patient educated on proper ergonomics at the work station  in order to maintain optimal alignment of the musculoskeletal system and improve efficiency in the work environment.   Patient educated on the importance of improved core and hip strength in order to improve alignment of the spine and lower extremities with static positions and dynamic movement.    Patient educated on the importance of strong core and lower extremity musculature in order to improve both static and dynamic balance, improve gait mechanics, reduce fall risk and improve household and community mobility.    Instructed to perform all exercises with scapular stability.     Written Home Exercises Provided: yes.  Exercises were reviewed and Fatuma was able to demonstrate them prior to the end of the session.  Fatuma demonstrated good  understanding of the education provided.     See EMR under Patient Instructions for exercises provided 7/28/2020    ASSESSMENT   Patient had more left shoulder fatigue compared to right with external rotation and overhead work today. Pt's L shoulder still mildly falls into internal rotation to guard the rotator cuff but it is advancing. PT upgraded her HEP and advised to slowly advance ROM and strength in stages during her reps to slowly begin recruiting her rotator cuff in various postures to teach good proprioception at home as she is still needing more rotator cuff support and lower trap support. Pt feels she will advance at home as instructed today and return at a later date if it doesn't resolve fully. Goals met except for lower traps still advancing and her FOTO score due to early discharge. Functionally she reports to PT a great change but it was not reflected on her FOTO score. PT to d/c at pt request.        Fatuma is progressing well towards her goals.   Pt prognosis is Good.     Pt will continue to benefit from skilled outpatient physical therapy to address the deficits listed in the problem list box on initial evaluation, provide pt/family education and to maximize  pt's level of independence in the home and community environment.     Pt's spiritual, cultural and educational needs considered and pt agreeable to plan of care and goals.     Anticipated Barriers for therapy: co-morbidities, chronicity of condition, repetitive injury     Goals:   Short Term Goals:  4 weeks     1. Pain: Pt will demonstrate improved pain by reports of less than or equal to 4/10 worst pain on the verbal rating scale in order to progress toward maximal functional ability and improve QOL.  Met 7/14/2020   2. Function: Patient will demonstrate improved function as indicated by a functional status score of less than or equal to 37 out of 100 on FOTO.  not met   3. Mobility: Patient will improve AROM to 50% of stated goals, listed in objective measures above, in order to progress towards independence with functional activities.   Met 7/14/2020   4. Strength: Patient will improve strength to 50% of stated goals, listed in objective measures above, in order to progress towards independence with functional activities.   Met 7/14/2020   5. HEP: Patient will demonstrate independence with HEP in order to progress toward functional independence.  Met 7/14/2020      Long Term Goals:  8 weeks     1. Pain: Pt will demonstrate improved pain by reports of less than or equal to 3/10 worst pain on the verbal rating scale in order to progress toward maximal functional ability and improve QOL.    Met   2. Function: Patient will demonstrate improved function as indicated by a functional status score of less than or equal to 35 out of 100 on FOTO.  not met   3. Mobility: Patient will improve AROM to stated goals, listed in objective measures above, in order to return to maximal functional potential and improve quality of life.  Met   4. Strength: Patient will improve strength to stated goals, listed in objective measures above, in order to improve functional independence and quality of life.  Not fully met   5. Patient will  return to normal ADL's, IADL's, community involvement, recreational activities, and work-related activities with less than or equal to 3/10 pain and maximal function.  Met         PLAN   D/C to HEP at pt request      Discharge reason: Patient requested discharge as going to begin OT for her hand  This patient is discharged from Physical Therapy    Tran Chapman, PT

## 2020-07-30 ENCOUNTER — CLINICAL SUPPORT (OUTPATIENT)
Dept: REHABILITATION | Facility: HOSPITAL | Age: 60
End: 2020-07-30
Payer: COMMERCIAL

## 2020-07-30 DIAGNOSIS — M79.645 PAIN OF LEFT MIDDLE FINGER: ICD-10-CM

## 2020-07-30 PROCEDURE — 97165 OT EVAL LOW COMPLEX 30 MIN: CPT | Performed by: OCCUPATIONAL THERAPIST

## 2020-07-30 PROCEDURE — 97110 THERAPEUTIC EXERCISES: CPT | Performed by: OCCUPATIONAL THERAPIST

## 2020-07-30 NOTE — PROGRESS NOTES
[facility] Occupational Therapy  Initial Evaluation     Date: 7/30/2020  Name: Fatuma Akins  Clinic Number: [id number]    Therapy Diagnosis: right middle trigger finger  Physician: Mercy Fitzpatrick MD    Physician Orders: Occupational therapy evaluate and treatment  Medical Diagnosis: right middle finger trigger finger  Surgical Procedure and Date: NA,   Evaluation Date: 7/30/2020  Insurance Authorization Period Expiration: 12/31/2020  Plan of Care Certification Period: 7/30/2020 to 8/30/2020  Date of Return to MD: None scheduled    Visit # / Visits authorized: 1 / 20  Time In:2:15 pm  Time Out: 3:15 pm  Total Billable Time: 15 minutes    Precautions:  HTN    Subjective     Involved Side: right  Dominant Side: Right  Date of Onset: March 2020  History of Current Condition/Mechanism of Injury: Patient is a right handed 60 year old female s/p right middle trigger finger.Patient reports that she noticed that her digit was locking after pulling weeds in the garden. She has been holding her digit in extension and avoiding bending her finger to avoid locking,however sometimes this is difficult to do during resistive tasks. She does not have any locking at night.  Imaging:  None  Previous Therapy: None    Past Medical History/Physical Systems Review:   Fatuma Akins  has a past medical history of Atrophic vaginitis, Hypertension, Hypertriglyceridemia, Hypothyroidism, and Obesity.    Fatuma Akins  has a past surgical history that includes Excision basal cell carcinoma and Colonoscopy (N/A, 8/1/2019).    Fatuma has a current medication list which includes the following prescription(s): ascorbate calcium, ascorbic acid/multivit-min, calcium-vitamin d3, cholecalciferol (vitamin d3), coq10 (ubiquinol), fish oil-omega-3 fatty acids, glucosam/chond-msm1/c/zari/bor, hydrochlorothiazide, levothyroxine, magnesium citrate, multivitamin with minerals, naproxen, turmeric, and vit a/c/e ac/znox/cupric  oxide.    Review of patient's allergies indicates:  No Known Allergies     Patient's Goals for Therapy: increased ability to  objects    Pain:  Functional Pain Scale Rating 0-10:   1/10 on average  0/10 at best  1/10 at worst  Location: A-1 pulley  Description: Dull  Aggravating Factors: Bending  Easing Factors: rest    Occupation:  NA  Working presently: retired  Duties: NA    Functional Limitations/Social History:    Previous functional status includes: Independent with all ADLs.     Current FunctionalStatus   Home/Living environment : lives with their spouse      Limitation of Functional Status as follows:   ADLs/IADLs:     - Feeding: mild difficulty cutting meat    - Bathing: No difficulty    - Dressing/Grooming: Mild difficulty    - Driving: None     Leisure: Gardening    Objective       CMS Impairment/Limitation/Restriction for FOTO hand Survey    Therapist reviewed FOTO scores for Fatuma Akins on 7/30/2020.   FOTO documents entered into [employer] - see Media section.    Limitation Score: 33%           Observation: Skin intact    Sensation: intact   Stereognosis: Intact    Special Tests:   Tenderness over the A-1 pulley    Edema: Circumferential measurements: N/A  Range of Motion: right Active  Right Middle finger  MP: 63 degrees  PIP: 73 degrees  DIP: 64 degrees    Thumb Opposition: WNL  Palmar Abduction: WNL  Radial Abduction: WNL    Wrist Ext/Flex: WNL/WNL  Wrist RD/UD: WNL/WNL  Supination/Pronation: WNL/WNL  Elbow extension/flexion: WNL/WNL    Manual Muscle Test: DEFFERED  Muscle   Strength       Strength: (TAY Dynamometer in lbs.) Average 3 trials, Position II  Right: 25#  Left: 32#    Pinch Strength: (Pinch Gauge in psi's), Average 3 trials  Go Pinch R) 6 psi's   L) 8 psi's  3pt Pinch   R) 4psi's  L) 6 psi's  2pt Pinch   R) 3 psi's   L) 3 psi's    Fine Suze Coordination Tests:   9 hole Peg Test R) NT   L) NT  PURDUE PEGBOARD  Placing R) NT L) NT  Bilateral Placing NT  4-Part Assembly  NT    Treatment     Treatment Time In: 3:00 pm  Treatment Time Out: 3:15 pm  Total Treatment time separate from Evaluation time:15 minutes    Fatuma received the following supervised modalities after being cleared for contradictions for 5 minutes:   -right hand    Fatuma received the following direct contact modalities after being cleared for contraindications for 0 minutes:    Fatuma received the following manual therapy techniques for 5 minutes:     Fatuma received therapeutic exercises for 10 minutes including:  -joint blocking of the PIP/DIP joint  -Middle finger extension stretches  -PROM of the middle finger all joints    Provided oval 8 splint size 10 with precautions and wearing schedule.    Home Exercise Program/Education:  Issued HEP (see patient instructions in EMR) and educated on modality use for pain management . Exercises were reviewed and Fatuma was able to demonstrate them prior to the end of the session.   Pt received a written copy of exercises to perform at home. Fatuma demonstrated good  understanding of the education provided.  Pt was advised to perform these exercises free of pain, and to stop performing them if pain occurs.    Patient/Family Education: role of OT, goals for OT, scheduling/cancellations - pt verbalized understanding. Discussed insurance limitations with patient.    Additional Education provided: instructed patient to use proximal shoulder muscles for upper extremity tasks.    Assessment     Fatuma Akins is a 60 y.o. female referred to outpatient occupational therapy and presents with a medical diagnosis of right middle trigger finger, resulting in Decreased ROM, Decreased  strength, Decreased pinch strength, Decreased functional hand use, Increased pain and Joint Stiffness and demonstrates limitations as described in the chart below. Following medical record review it is determined that pt will benefit from occupational therapy services in order to maximize pain free and/or  functional use of right hand. The following goals were discussed with the patient and patient is in agreement with them as to be addressed in the treatment plan. The patient's rehab potential is Good.     Anticipated barriers to occupational therapy: None  Pt has no cultural, educational or language barriers to learning provided.    Profile and History Assessment of Occupational Performance Level of Clinical Decision Making Complexity Score   Occupational Profile:   Fatuma Akins is a 60 y.o. female who lives with their spouse and is retired Fatuma Akins has difficulty with  ADLs and IADLs as listed previously, which  affecting his/her daily functional abilities.      Comorbidities:    has a past medical history of Atrophic vaginitis, Hypertension, Hypertriglyceridemia, Hypothyroidism, and Obesity.    Medical and Therapy History Review:   Brief               Performance Deficits    Physical:  Joint Mobility   Strength  Pinch Strength  Pain    Cognitive:  No Deficits    Psychosocial:    No Deficits     Clinical Decision Making:  low    Assessment Process:  Problem-Focused Assessments    Modification/Need for Assistance:  Not Necessary    Intervention Selection:  Limited Treatment Options       low  Based on PMHX, co morbidities , data from assessments and functional level of assistance required with task and clinical presentation directly impacting function.       The following goals were discussed with the patient and patient is in agreement with them as to be addressed in the treatment plan.     Goals:     Short Term Goals: Patient to be IND with HEP and modalities for pain/edema managment., Increase ROM 3-5 degrees to increase functional hand use for ADLs/work/leisure activities., Increase  strength 3-5 lbs. to improve functional grasp for ADLs/work/leisure activities. , Increase pinch 1-3 psi's to increase IND wiht button and FM Coordination., Decrease complaints of pain to  0 out of 10 to increase  functional hand use for ADL/work/leisure activities. and Patient to be IND wiht Orthotic use, wear and care precautions.     Long Term Goals:  Patient will cut her food without pain.  Patient will rest her digit with the use of her oval 8 splint and avoid locking.  Patient will report no difficulty with dressing skills.    Plan   Certification Period/Plan of care expiration: 7/30/2020 to 8/30/2020.    Outpatient Occupational Therapy 1 times weekly for 4 weeks to include the following interventions: Manual therapy/joint mobilizations, Modalities for pain management, Therapeutic exercises/activities. and Orthotic Fabrication/Fit/Training.      Marlyn Vilchis, OTR

## 2020-07-31 PROBLEM — M79.645 PAIN OF LEFT MIDDLE FINGER: Status: ACTIVE | Noted: 2020-07-31

## 2020-08-07 ENCOUNTER — CLINICAL SUPPORT (OUTPATIENT)
Dept: REHABILITATION | Facility: HOSPITAL | Age: 60
End: 2020-08-07
Payer: COMMERCIAL

## 2020-08-07 DIAGNOSIS — M79.641 PAIN OF RIGHT HAND: ICD-10-CM

## 2020-08-07 PROCEDURE — 97110 THERAPEUTIC EXERCISES: CPT | Performed by: OCCUPATIONAL THERAPIST

## 2020-08-07 NOTE — PROGRESS NOTES
OCCUPATIONAL THERAPY DAILY NOTE      Name: Fatuma Akins  Clinic Number: [id number]    Visit Date: 8/7/2020    Therapy Diagnosis: right middle trigger finger  Physician: Mercy Fitzpatrick MD    Physician Orders: Occupational therapy evaluate and treatment  Medical Diagnosis: right middle finger trigger finger  Surgical Procedure and Date: NA,   Evaluation Date: 7/30/2020  Insurance Authorization Period Expiration: 12/31/2020  Plan of Care Certification Period: 7/30/2020 to 8/30/2020  Date of Return to MD: None scheduled    Visit # / Visits authorized: 3/ 20  Time In:12:45 pm  Time Out: 1:15 pm  Total Billable Time: 15 minutes    Precautions:  HTN    Subjective       Today, pt reports: that she is doing much better,but she has not iced her hand..  She was compliant with home exercise program.  Response to previous treatment: no pain  Functional change: patient is able to open and close her hand without locking of her middle finger    Pre-Treatment Pain: 0/10  Post-Treatment Pain: 0/10  Location: NA  TREATMENT     Fatuma received therapeutic exercises to develop ROM and flexibility for 10 minutes including:    Exercise 8/7/2020   DIGIT extension stretches    DIP flexion/extension joint blocking    Passive range of motion of all joints of the Middle finger                            Fatuma received the following manual therapy techniques: Soft tissue Mobilization and IASTM were applied to the: right palm for 5 minutes, including:  STM of right middle flinger and palm over the A-1 pulley      Fatuma received the following supervised modalities after being cleared for contradictions:     Fatuma received hot pack for 10 minutes to right hand..    Fatuma received cold pack for 0 minutes ..      Home Exercises Provided and Patient Education Provided     Education/Self-Care provided: (3 minutes)   Patient educated on biomechanical justification for therapeutic exercise and importance of compliance with HEP in order to improve  overall impairments and QOL    Patient educated on postural awareness    Patient educated on proper ergonomics     Written Home Exercises Provided: Patient instructed to cont prior HEP.  Exercises were reviewed and Fatuma was able to demonstrate them prior to the end of the session.  Fatuma demonstrated good  understanding of the education provided.     See EMR under Patient Instructions for exercises provided prior visit.    ASSESSMENT   Pt tolerated manual therapy well with reports of decreased pain and tension in musculature following intervention. Pt tolerated exercise well with reports of increased fatigue but no increased pain. Pt demonstrated good understanding of exercises and required minimal cueing to maintain proper form.  Patient has decreased pain with palpation over the middle finger A-1 pulley. Patient appears compliant with her figure 8 brace and is having good results.    Fatuma is progressing well towards her goals.   Pt prognosis is Excellent.     Pt will continue to benefit from skilled outpatient occupational therapy to address the deficits listed in the problem list box on initial evaluation, provide pt/family education and to maximize pt's level of independence in the home and community environment.     Pt's spiritual, cultural and educational needs considered and pt agreeable to plan of care and goals.     Anticipated barriers to occupational therapy: None    Goals:     Short Term Goals: Patient to be IND with HEP and modalities for pain/edema managment., Increase ROM 3-5 degrees to increase functional hand use for ADLs/work/leisure activities., Increase  strength 3-5 lbs. to improve functional grasp for ADLs/work/leisure activities. , Increase pinch 1-3 psi's to increase IND wiht button and FM Coordination., Decrease complaints of pain to  0 out of 10 to increase functional hand use for ADL/work/leisure activities. and Patient to be IND wiht Orthotic use, wear and care precautions.      Long Term Goals:  Patient will cut her food without pain.  Patient will rest her digit with the use of her oval 8 splint and avoid locking.  Patient will report no difficulty with dressing skills.        PLAN   Continue Plan of Care (POC) and progress per patient tolerance.    Marlyn Vilchis OTR, HEMANT

## 2020-08-10 PROBLEM — M79.644 FINGER PAIN, RIGHT: Status: ACTIVE | Noted: 2020-07-31

## 2020-08-14 ENCOUNTER — CLINICAL SUPPORT (OUTPATIENT)
Dept: REHABILITATION | Facility: HOSPITAL | Age: 60
End: 2020-08-14
Payer: COMMERCIAL

## 2020-08-14 DIAGNOSIS — M79.644 FINGER PAIN, RIGHT: ICD-10-CM

## 2020-08-14 PROBLEM — M79.641 PAIN OF RIGHT HAND: Status: ACTIVE | Noted: 2020-08-14

## 2020-08-14 PROCEDURE — 97110 THERAPEUTIC EXERCISES: CPT | Performed by: OCCUPATIONAL THERAPIST

## 2020-08-14 NOTE — PROGRESS NOTES
OCCUPATIONAL THERAPY DAILY NOTE      Name: Fatuma Akins  Clinic Number: [id number]    Visit Date: 8/14/2020    Therapy Diagnosis: right middle trigger finger  Physician: Mercy Fitzpatrick MD    Physician Orders: Occupational therapy evaluate and treatment  Medical Diagnosis: right middle finger trigger finger  Surgical Procedure and Date: NA,   Evaluation Date: 7/30/2020  Insurance Authorization Period Expiration: 12/31/2020  Plan of Care Certification Period: 7/30/2020 to 8/30/2020  Date of Return to MD: None scheduled    Visit # / Visits authorized: 3/ 20  Time In:11:15 am  Time Out: 12:00 pm  Total Billable Time: 15 minutes    Precautions:  HTN    Subjective       Today, pt reports: no problems. She tried ice and it really worked.  She was compliant with home exercise program.  Response to previous treatment: no pain  Functional change: patient is able to open and close her hand without locking of her middle finger x5.    Pre-Treatment Pain: 0/10  Post-Treatment Pain: 0/10  Location: NA  TREATMENT     Fatuma received therapeutic exercises to develop ROM and flexibility for 15 minutes including:    Exercise 8/14/2020   DIGIT extension stretches    DIP flexion/extension joint blocking    Passive range of motion of all joints of the Middle finger    Theraband exercises: red  Rows/bilateral external rotation and bilateral extension 3 sets 10                       Fatuma received the following manual therapy techniques: Soft tissue Mobilization and IASTM were applied to the: right palm for 5 minutes, including:  STM of right middle flinger and palm over the A-1 pulley      Fatuma received the following supervised modalities after being cleared for contradictions:     Fatuma received hot pack for 10 minutes to right hand..    Fatuma received cold pack for 0 minutes ..      Home Exercises Provided and Patient Education Provided     Education/Self-Care provided: (3 minutes)   Patient educated on biomechanical  justification for therapeutic exercise and importance of compliance with HEP in order to improve overall impairments and QOL    Patient educated on postural awareness    Patient educated on scapular strengthening exercises to use proximal muscles and avoid overuse of her hands.    Written Home Exercises Provided: Patient instructed to cont prior HEP.  Exercises were reviewed and Fatuma was able to demonstrate them prior to the end of the session.  Fatuma demonstrated good  understanding of the education provided.     See EMR under Patient Instructions for exercises provided prior visit.    ASSESSMENT   Pt tolerated manual therapy well with reports of decreased pain and tension in musculature following intervention. Pt tolerated exercise well with reports of increased fatigue but no increased pain. Pt demonstrated good understanding of exercises and required minimal cueing to maintain proper form.  Patient has decreased pain with palpation over the middle finger A-1 pulley. Patient appears compliant with her figure 8 brace and is having good results and can open and close her hand without triggering. Recommended weaning herself off of the brace at the end of the day.    Fatuma is progressing well towards her goals.   Pt prognosis is Excellent.     Pt will continue to benefit from skilled outpatient occupational therapy to address the deficits listed in the problem list box on initial evaluation, provide pt/family education and to maximize pt's level of independence in the home and community environment.     Pt's spiritual, cultural and educational needs considered and pt agreeable to plan of care and goals.     Anticipated barriers to occupational therapy: None    Goals:     Short Term Goals: Patient to be IND with HEP and modalities for pain/edema managment., Increase ROM 3-5 degrees to increase functional hand use for ADLs/work/leisure activities., Increase  strength 3-5 lbs. to improve functional grasp for  ADLs/work/leisure activities. , Increase pinch 1-3 psi's to increase IND wiht button and FM Coordination., Decrease complaints of pain to  0 out of 10 to increase functional hand use for ADL/work/leisure activities. and Patient to be IND wiht Orthotic use, wear and care precautions.     Long Term Goals:  Patient will cut her food without pain.  Patient will rest her digit with the use of her oval 8 splint and avoid locking. Met 8/14/2020  Patient will report no difficulty with dressing skills.        PLAN   Continue Plan of Care (POC) and progress per patient tolerance.    Marlyn Vilchis, OTR, LOT[facility]

## 2020-08-25 ENCOUNTER — OFFICE VISIT (OUTPATIENT)
Dept: DERMATOLOGY | Facility: CLINIC | Age: 60
End: 2020-08-25
Payer: COMMERCIAL

## 2020-08-25 DIAGNOSIS — L90.5 SCAR CONDITIONS/SKIN FIBROSIS: Primary | ICD-10-CM

## 2020-08-25 DIAGNOSIS — L82.1 SEBORRHEIC KERATOSIS: ICD-10-CM

## 2020-08-25 DIAGNOSIS — Z85.828 HISTORY OF SKIN CANCER: ICD-10-CM

## 2020-08-25 DIAGNOSIS — Z12.83 SCREENING, MALIGNANT NEOPLASM, SKIN: ICD-10-CM

## 2020-08-25 DIAGNOSIS — D22.9 MULTIPLE NEVI: ICD-10-CM

## 2020-08-25 PROCEDURE — 99999 PR PBB SHADOW E&M-EST. PATIENT-LVL III: CPT | Mod: PBBFAC,,, | Performed by: DERMATOLOGY

## 2020-08-25 PROCEDURE — 99213 OFFICE O/P EST LOW 20 MIN: CPT | Mod: S$GLB,,, | Performed by: DERMATOLOGY

## 2020-08-25 PROCEDURE — 99999 PR PBB SHADOW E&M-EST. PATIENT-LVL III: ICD-10-PCS | Mod: PBBFAC,,, | Performed by: DERMATOLOGY

## 2020-08-25 PROCEDURE — 99213 PR OFFICE/OUTPT VISIT, EST, LEVL III, 20-29 MIN: ICD-10-PCS | Mod: S$GLB,,, | Performed by: DERMATOLOGY

## 2020-08-25 NOTE — PATIENT INSTRUCTIONS
Preventing Skin Cancer  Relaxing in the sun may feel good. But it isnt good for your skin. In fact, being exposed to the suns harmful rays is a major cause of skin cancer. This is a serious disease that can be life-threatening. People of all ages and backgrounds are at risk. But in most cases, skin cancer can be prevented.    Your Role in Prevention  You can act today to help prevent skin cancer. Start by avoiding the suns UV (ultraviolet) rays. And dont use tanning beds, which are no safer than the sun. Taking these steps can help keep you from getting skin cancer. It can also help prevent wrinkles and other sun-induced aging effects. Make sure your children also follow these safeguards. Now is the time to start taking preventive steps against skin cancer.  When You Are Outdoors  Protect your skin when you go outdoors during the day. Take precautions whenever you go out to eat, run errands by car or on foot, or do any outdoor activity. There isnt just one easy way to protect your skin. Its best to follow all of these steps:  · Wear tightly woven clothing that covers your skin. Put on a wide-brimmed hat to protect your face, ears, and scalp.  · Watch the clock. Try to avoid the sun between 10 a.m. and 4 p.m., when it is strongest.  · Head for the shade or create your own. Use an umbrella when sitting or strolling.  · Know that the suns rays can reflect off sand, water, and snow. This can harm your skin. Take extra care when you are near reflective surfaces.  · Keep in mind that even when the weather is hazy or cloudy, your skin can be exposed to strong UV rays.  · Shield your skin with sunscreen. Also, apply sunscreen to your childrens skin.  Tips for Using Sunscreen  To help prevent skin cancer, choose the right sunscreen and use it correctly. Try the following tips:  · Choose a sunscreen that has a sun protection factor (SPF) of at least 15. For the best protection, an SPF of at least 30 is preferred.  Also, choose a sunscreen labeled broad spectrum. This will shield you from both UVA and UVB (ultraviolet A and B) rays.  · If one brand irritates your skin, try another, particularly ones without fragrance.  · Use a water-resistant sunscreen if swimming or sweating.  · Reapply sunscreen every 2 hours. If youre active, do this more often.  · Cover any sun-exposed skin, from your face to your feet. Dont forget your ears and your lips.  · Know that while sunscreen helps protect you, it isnt enough. You should also wear protective clothing. And try to stay out of the sun as much as you can, especially from 10 a.m. to 4 p.m.  © 4080-6880 [employer]. [address] [place] [address], [place] [address]. All rights reserved. This information is not intended as a substitute for professional medical care. Always follow your healthcare professional's instructions.

## 2020-08-25 NOTE — PROGRESS NOTES
Subjective:       Patient ID:  Fatuma Akins is a 60 y.o. female who presents for   Chief Complaint   Patient presents with    Skin Check     Hx of NMSC of the nose (s/p Mohs by Dr. Elizabeth > 10 years ago) and SK's, last seen on 8/22/19.  Here today for annual skin check. Denies bleeding, tender, growing, or concerning lesions.     This is a high risk patient here to check for the development of new lesions.              Review of Systems   Constitutional: Negative for fever and chills.   Gastrointestinal: Negative for nausea and vomiting.   Skin: Negative for daily sunscreen use, activity-related sunscreen use and recent sunburn.   Hematologic/Lymphatic: Does not bruise/bleed easily.        Objective:    Physical Exam   Constitutional: She appears well-developed and well-nourished. No distress.   Neurological: She is alert and oriented to person, place, and time. She is not disoriented.   Psychiatric: She has a normal mood and affect.   Skin:   Areas Examined (abnormalities noted in diagram):   Scalp / Hair Palpated and Inspected  Head / Face Inspection Performed  Neck Inspection Performed  Chest / Axilla Inspection Performed  Abdomen Inspection Performed  Genitals / Buttocks / Groin Inspection Performed  Back Inspection Performed  RUE Inspected  LUE Inspection Performed  RLE Inspected  LLE Inspection Performed  Nails and Digits Inspection Performed                   Diagram Legend     Erythematous scaling macule/papule c/w actinic keratosis       Vascular papule c/w angioma      Pigmented verrucoid papule/plaque c/w seborrheic keratosis      Yellow umbilicated papule c/w sebaceous hyperplasia      Irregularly shaped tan macule c/w lentigo     1-2 mm smooth white papules consistent with Milia      Movable subcutaneous cyst with punctum c/w epidermal inclusion cyst      Subcutaneous movable cyst c/w pilar cyst      Firm pink to brown papule c/w dermatofibroma      Pedunculated fleshy papule(s) c/w skin tag(s)       Evenly pigmented macule c/w junctional nevus     Mildly variegated pigmented, slightly irregular-bordered macule c/w mildly atypical nevus      Flesh colored to evenly pigmented papule c/w intradermal nevus       Pink pearly papule/plaque c/w basal cell carcinoma      Erythematous hyperkeratotic cursted plaque c/w SCC      Surgical scar with no sign of skin cancer recurrence      Open and closed comedones      Inflammatory papules and pustules      Verrucoid papule consistent consistent with wart     Erythematous eczematous patches and plaques     Dystrophic onycholytic nail with subungual debris c/w onychomycosis     Umbilicated papule    Erythematous-base heme-crusted tan verrucoid plaque consistent with inflamed seborrheic keratosis     Erythematous Silvery Scaling Plaque c/w Psoriasis     See annotation      Assessment / Plan:        Scar conditions/skin fibrosis  Screening, malignant neoplasm, skin  History of skin cancer  Scar of the nose, hx of NMSC.  No evidence of recurrence on physical exam today.  Continue routine skin surveillance. Daily sunscreen advised.    Multiple nevi  Reassurance given.  Discussed ABCDEF of melanoma and changes for patient to look for. Discussed importance of daily use of sunscreen which is broad-spectrum and has a minimum SPF of 30.    Seborrheic keratosis  Reassurance given. Discussed diagnosis and that lesions are benign.  AAD handout given.              Follow up in about 1 year (around 8/25/2021).

## 2020-08-26 ENCOUNTER — CLINICAL SUPPORT (OUTPATIENT)
Dept: REHABILITATION | Facility: HOSPITAL | Age: 60
End: 2020-08-26
Payer: COMMERCIAL

## 2020-08-26 DIAGNOSIS — M79.641 PAIN OF RIGHT HAND: ICD-10-CM

## 2020-08-26 PROCEDURE — 97110 THERAPEUTIC EXERCISES: CPT | Performed by: OCCUPATIONAL THERAPIST

## 2020-08-26 NOTE — PROGRESS NOTES
OCCUPATIONAL THERAPY DAILY NOTE      Name: Fatuma Akins  Clinic Number: [id number]    Visit Date: 8/26/2020    Therapy Diagnosis: right middle trigger finger  Physician: Mercy Fitzpatrick MD    Physician Orders: Occupational therapy evaluate and treatment  Medical Diagnosis: right middle finger trigger finger  Surgical Procedure and Date: NA,   Evaluation Date: 7/30/2020  Insurance Authorization Period Expiration: 12/31/2020  Plan of Care Certification Period: 7/30/2020 to 8/30/2020  Date of Return to MD: None scheduled    Visit # / Visits authorized: 4/ 20  Time In:10:45 am  Time Out: 11:30 am  Total Billable Time: 15 minutes    Precautions:  HTN    Subjective       Today, pt reports: no problems, but she is wearing her oval 8 brace during resistive activities.  She was compliant with home exercise program.  Response to previous treatment: no pain  Functional change: patient is able to open and close her hand without locking of her middle finger x10.    Pre-Treatment Pain: 0/10  Post-Treatment Pain: 0/10  Location: NA  TREATMENT     Fatuma received therapeutic exercises to develop ROM and flexibility for 15 minutes including:    Exercise 8/26/2020   DIGIT extension stretches    DIP flexion/extension joint blocking    Passive range of motion of all joints of the Middle finger    Theraband exercises: red  Rows/bilateral external rotation and bilateral extension 3 sets 10                       Fatuma received the following manual therapy techniques: Soft tissue Mobilization and IASTM were applied to the: right palm for 5 minutes, including:  STM of right middle flinger and palm over the A-1 pulley      Fatuma received the following supervised modalities after being cleared for contradictions:     Fatuma received hot pack for 10 minutes to right hand..    Fatuma received cold pack for 0 minutes ..      Home Exercises Provided and Patient Education Provided     Education/Self-Care provided: (3 minutes)   Patient educated  on biomechanical justification for therapeutic exercise and importance of compliance with HEP in order to improve overall impairments and QOL    Patient educated on postural awareness    Patient educated on scapular strengthening exercises to use proximal muscles and avoid overuse of her hands.    Written Home Exercises Provided: Patient instructed to cont prior HEP.  Exercises were reviewed and Fatuma was able to demonstrate them prior to the end of the session.  Fatuma demonstrated good  understanding of the education provided.     See EMR under Patient Instructions for exercises provided prior visit.    ASSESSMENT   Pt tolerated manual therapy well with reports of decreased pain and tension in musculature following intervention. Pt tolerated exercise well with reports of increased fatigue but no increased pain. Pt demonstrated good understanding of exercises and required minimal cueing to maintain proper form.  Patient has no pain with palpation over the middle finger A-1 pulley, but has a palpaple nodule. Patient appears compliant with wearing her figure 8 brace and can open and close her hand without triggering. Recommended weaning herself off of the brace  during light resistive activities,perfomring ice massages and wearing her oval 8 brace another 2 weeks . Patient continues to have locking without pain with resistive activities and force full hook fist. Recommended she call her primary care physician and request a Cortizone injection.    Fatuma is progressing well towards her goals.   Pt prognosis is Excellent.     Pt will continue to benefit from skilled outpatient occupational therapy to address the deficits listed in the problem list box on initial evaluation, provide pt/family education and to maximize pt's level of independence in the home and community environment.     Pt's spiritual, cultural and educational needs considered and pt agreeable to plan of care and goals.     Anticipated barriers to  occupational therapy: None    Goals:     Short Term Goals: Patient to be IND with HEP and modalities for pain/edema managment.,Met 8/26/2020  Increase ROM 3-5 degrees to increase functional hand use for ADLs/work/leisure activities., Increase  strength 3-5 lbs. to improve functional grasp for ADLs/work/leisure activities. , Increase pinch 1-3 psi's to increase IND wiht button and FM Coordination., Decrease complaints of pain to  0 out of 10 to increase functional hand use for ADL/work/leisure activities. Met 8/26/2020 and Patient to be IND wiht Orthotic use, wear and care precautions. Met 8/26/2020    Long Term Goals:  Patient will cut her food without pain. Met 8/26/2020  Patient will rest her digit with the use of her oval 8 splint and avoid locking. Met 8/14/2020  Patient will report no difficulty with dressing skills.      Plan: DISCHARGE OUTPATIENT OCCUPATIONAL THERAPY    HEMANT AVILA      Outpatient Therapy Discharge Summary     Name: Fatuma MERRILL Tashi  Clinic Number: [id number]     Physician Orders: Occupational therapy evaluate and treatment  Medical Diagnosis: right middle finger trigger finger  Surgical Procedure and Date: NA,   Evaluation Date: 7/30/2020  Therapy Diagnosis: trigger finger  Encounter Diagnosis   Name Primary?    Pain of right hand      Physician: Mercy Fitzpatrick MD    Date of Last visit: 8/26/2020  Total Visits Received: 4  Cancelled Visits: 0  No Show Visits: 0    Assessment    Goals: Short Term Goals: Patient to be IND with HEP and modalities for pain/edema managment.,Met 8/26/2020  Increase ROM 3-5 degrees to increase functional hand use for ADLs/work/leisure activities., Increase  strength 3-5 lbs. to improve functional grasp for ADLs/work/leisure activities. , Increase pinch 1-3 psi's to increase IND wiht button and FM Coordination., Decrease complaints of pain to  0 out of 10 to increase functional hand use for ADL/work/leisure activities. Met 8/26/2020 and Patient to  be IND wiht Orthotic use, wear and care precautions. Met 8/26/2020    Long Term Goals:  Patient will cut her food without pain. Met 8/26/2020  Patient will rest her digit with the use of her oval 8 splint and avoid locking. Met 8/14/2020  Patient will report no difficulty with dressing skills.        Discharge reason: Patient has reached the maximum rehab potential for the present time    Plan   This patient is discharged from Occupational Therapy

## 2020-10-12 ENCOUNTER — PATIENT OUTREACH (OUTPATIENT)
Dept: OTHER | Facility: OTHER | Age: 60
End: 2020-10-12

## 2020-10-12 NOTE — PROGRESS NOTES
*Patient wrote back, confirmed medication list. Will continue to monitor. WCB in 4-6 months.    Attempted to reach patient for routine follow up. Reviewed available blood pressure readings and labs. Per 2017 ACC/AHA Hypertension Guidelines, current 30-day average is at goal.     Last 5 Patient Entered Readings                                      Current 30 Day Average: 110/80     Recent Readings 10/10/2020 10/6/2020 10/2/2020 9/30/2020 9/28/2020    SBP (mmHg) 99 106 113 115 111    DBP (mmHg) 80 88 78 83 79    Pulse 72 68 67 60 69        Hypertension Medications             hydroCHLOROthiazide (MICROZIDE) 12.5 mg capsule Take 1 capsule (12.5 mg total) by mouth once daily.        Left a voicemail and requested patient call back. Will also message patient.    Will continue to monitor regularly. Will follow up in 1 month.

## 2020-10-26 ENCOUNTER — PATIENT MESSAGE (OUTPATIENT)
Dept: ADMINISTRATIVE | Facility: OTHER | Age: 60
End: 2020-10-26

## 2020-10-28 ENCOUNTER — PATIENT OUTREACH (OUTPATIENT)
Dept: ADMINISTRATIVE | Facility: OTHER | Age: 60
End: 2020-10-28

## 2020-10-28 ENCOUNTER — IMMUNIZATION (OUTPATIENT)
Dept: PHARMACY | Facility: CLINIC | Age: 60
End: 2020-10-28
Payer: COMMERCIAL

## 2020-10-28 ENCOUNTER — OFFICE VISIT (OUTPATIENT)
Dept: OPHTHALMOLOGY | Facility: CLINIC | Age: 60
End: 2020-10-28
Payer: COMMERCIAL

## 2020-10-28 DIAGNOSIS — H52.13 MYOPIA, BILATERAL: ICD-10-CM

## 2020-10-28 DIAGNOSIS — I10 ESSENTIAL HYPERTENSION: Primary | ICD-10-CM

## 2020-10-28 DIAGNOSIS — H25.13 CATARACT, NUCLEAR SCLEROTIC SENILE, BILATERAL: ICD-10-CM

## 2020-10-28 DIAGNOSIS — H52.4 BILATERAL PRESBYOPIA: ICD-10-CM

## 2020-10-28 PROCEDURE — 92014 COMPRE OPH EXAM EST PT 1/>: CPT | Mod: S$GLB,,, | Performed by: OPTOMETRIST

## 2020-10-28 PROCEDURE — 99999 PR PBB SHADOW E&M-EST. PATIENT-LVL III: ICD-10-PCS | Mod: PBBFAC,,, | Performed by: OPTOMETRIST

## 2020-10-28 PROCEDURE — 92015 PR REFRACTION: ICD-10-PCS | Mod: S$GLB,,, | Performed by: OPTOMETRIST

## 2020-10-28 PROCEDURE — 99999 PR PBB SHADOW E&M-EST. PATIENT-LVL III: CPT | Mod: PBBFAC,,, | Performed by: OPTOMETRIST

## 2020-10-28 PROCEDURE — 92015 DETERMINE REFRACTIVE STATE: CPT | Mod: S$GLB,,, | Performed by: OPTOMETRIST

## 2020-10-28 PROCEDURE — 92014 PR EYE EXAM, EST PATIENT,COMPREHESV: ICD-10-PCS | Mod: S$GLB,,, | Performed by: OPTOMETRIST

## 2020-10-28 NOTE — PROGRESS NOTES
HPI     Hypertensive Eye Exam      Additional comments: yearly              Comments     Last Exam w/ TRF 10/23/2019  No Visual Complaints  No Pain          Last edited by Citlalli Lei on 10/28/2020  1:16 PM. (History)            Assessment /Plan     For exam results, see Encounter Report.    Essential hypertension    Cataract, nuclear sclerotic senile, bilateral    Myopia, bilateral    Bilateral presbyopia      No HTN Retinopathy    Mild cataracts OU, not surgical.    Dispense Final Rx for glasses.  RTC 1 year  Discussed above and answered questions.

## 2020-10-28 NOTE — PROGRESS NOTES
Health Maintenance Due   Topic Date Due    Shingles Vaccine (1 of 2) 04/25/2011    Influenza Vaccine (1) 08/01/2020     Updates were requested from care everywhere.  Chart was reviewed for overdue Proactive [facility] Encounters ([facility]) topics (CRS, Breast Cancer Screening, Eye exam)  Health Maintenance has been updated.  LINKS immunization registry triggered.  Immunizations were reconciled.

## 2020-12-07 NOTE — PROGRESS NOTES
Digital Medicine: Health  Follow-Up    The history is provided by the patient.             Reason for review: Blood pressure at goal        Topics Covered on Call: Diet    Additional Follow-up details: Patient stated that she is doing well overall. She denies any major issues at this time. She attributes her higher diastolic numbers to increased sodium intake and snacking which is starting to work on making changes with. She denies any symptoms related to her readings. She will reach out if any concerns arise.             Diet-Change      Dietary Indiscretions:Patient has consumed more sodium in the last few months along with snacking, etc and has gained weight. She attributes this to Covid stress. She plans to get back on track by the beginning of next year and she is open to setting new goals.       Physical Activity-Not assessed    Medication Adherence-Medication adherence was assessed.      Substance, Sleep, Stress-Not assessed      Continue current diet/physical activity routine.  Provided patient education.       Addressed patient questions and patient has my contact information if needed prior to next outreach. Patient verbalizes understanding.      Explained the importance of self-monitoring and medication adherence. Encouraged the patient to communicate with their health  for lifestyle modifications to help improve or maintain a healthy lifestyle.               There are no preventive care reminders to display for this patient.      Last 5 Patient Entered Readings                                      Current 30 Day Average: 111/79     Recent Readings 12/4/2020 12/2/2020 11/30/2020 11/18/2020 11/16/2020    SBP (mmHg) 112 104 116 119 115    DBP (mmHg) 85 77 84 80 77    Pulse 68 60 66 79 60

## 2020-12-21 ENCOUNTER — PATIENT MESSAGE (OUTPATIENT)
Dept: FAMILY MEDICINE | Facility: CLINIC | Age: 60
End: 2020-12-21

## 2020-12-21 ENCOUNTER — PATIENT MESSAGE (OUTPATIENT)
Dept: ADMINISTRATIVE | Facility: OTHER | Age: 60
End: 2020-12-21

## 2021-01-29 ENCOUNTER — PATIENT OUTREACH (OUTPATIENT)
Dept: OTHER | Facility: OTHER | Age: 61
End: 2021-01-29

## 2021-06-29 ENCOUNTER — LAB VISIT (OUTPATIENT)
Dept: LAB | Facility: HOSPITAL | Age: 61
End: 2021-06-29
Attending: FAMILY MEDICINE
Payer: COMMERCIAL

## 2021-06-29 ENCOUNTER — OFFICE VISIT (OUTPATIENT)
Dept: FAMILY MEDICINE | Facility: CLINIC | Age: 61
End: 2021-06-29
Payer: COMMERCIAL

## 2021-06-29 VITALS
HEART RATE: 81 BPM | BODY MASS INDEX: 35.7 KG/M2 | WEIGHT: 181.81 LBS | SYSTOLIC BLOOD PRESSURE: 112 MMHG | OXYGEN SATURATION: 98 % | TEMPERATURE: 98 F | DIASTOLIC BLOOD PRESSURE: 80 MMHG | HEIGHT: 60 IN

## 2021-06-29 DIAGNOSIS — Z12.31 ENCOUNTER FOR SCREENING MAMMOGRAM FOR MALIGNANT NEOPLASM OF BREAST: ICD-10-CM

## 2021-06-29 DIAGNOSIS — E03.9 ACQUIRED HYPOTHYROIDISM: ICD-10-CM

## 2021-06-29 DIAGNOSIS — I10 ESSENTIAL HYPERTENSION: ICD-10-CM

## 2021-06-29 DIAGNOSIS — Z00.00 PREVENTATIVE HEALTH CARE: ICD-10-CM

## 2021-06-29 DIAGNOSIS — E78.1 HYPERTRIGLYCERIDEMIA: ICD-10-CM

## 2021-06-29 DIAGNOSIS — Z00.00 PREVENTATIVE HEALTH CARE: Primary | ICD-10-CM

## 2021-06-29 LAB
ALBUMIN SERPL BCP-MCNC: 4.4 G/DL (ref 3.5–5.2)
ALP SERPL-CCNC: 67 U/L (ref 55–135)
ALT SERPL W/O P-5'-P-CCNC: 23 U/L (ref 10–44)
ANION GAP SERPL CALC-SCNC: 12 MMOL/L (ref 8–16)
AST SERPL-CCNC: 21 U/L (ref 10–40)
BASOPHILS # BLD AUTO: 0.1 K/UL (ref 0–0.2)
BASOPHILS NFR BLD: 1.2 % (ref 0–1.9)
BILIRUB SERPL-MCNC: 0.6 MG/DL (ref 0.1–1)
BUN SERPL-MCNC: 14 MG/DL (ref 6–20)
CALCIUM SERPL-MCNC: 10.4 MG/DL (ref 8.7–10.5)
CHLORIDE SERPL-SCNC: 106 MMOL/L (ref 95–110)
CHOLEST SERPL-MCNC: 186 MG/DL (ref 120–199)
CHOLEST/HDLC SERPL: 3.8 {RATIO} (ref 2–5)
CO2 SERPL-SCNC: 24 MMOL/L (ref 23–29)
CREAT SERPL-MCNC: 0.9 MG/DL (ref 0.5–1.4)
DIFFERENTIAL METHOD: ABNORMAL
EOSINOPHIL # BLD AUTO: 0.2 K/UL (ref 0–0.5)
EOSINOPHIL NFR BLD: 2.4 % (ref 0–8)
ERYTHROCYTE [DISTWIDTH] IN BLOOD BY AUTOMATED COUNT: 14.6 % (ref 11.5–14.5)
EST. GFR  (AFRICAN AMERICAN): >60 ML/MIN/1.73 M^2
EST. GFR  (NON AFRICAN AMERICAN): >60 ML/MIN/1.73 M^2
GLUCOSE SERPL-MCNC: 111 MG/DL (ref 70–110)
HCT VFR BLD AUTO: 44.4 % (ref 37–48.5)
HDLC SERPL-MCNC: 49 MG/DL (ref 40–75)
HDLC SERPL: 26.3 % (ref 20–50)
HGB BLD-MCNC: 14.4 G/DL (ref 12–16)
IMM GRANULOCYTES # BLD AUTO: 0.02 K/UL (ref 0–0.04)
IMM GRANULOCYTES NFR BLD AUTO: 0.2 % (ref 0–0.5)
LDLC SERPL CALC-MCNC: 110 MG/DL (ref 63–159)
LYMPHOCYTES # BLD AUTO: 2.7 K/UL (ref 1–4.8)
LYMPHOCYTES NFR BLD: 32.3 % (ref 18–48)
MCH RBC QN AUTO: 28 PG (ref 27–31)
MCHC RBC AUTO-ENTMCNC: 32.4 G/DL (ref 32–36)
MCV RBC AUTO: 86 FL (ref 82–98)
MONOCYTES # BLD AUTO: 0.7 K/UL (ref 0.3–1)
MONOCYTES NFR BLD: 8.8 % (ref 4–15)
NEUTROPHILS # BLD AUTO: 4.6 K/UL (ref 1.8–7.7)
NEUTROPHILS NFR BLD: 55.1 % (ref 38–73)
NONHDLC SERPL-MCNC: 137 MG/DL
NRBC BLD-RTO: 0 /100 WBC
PLATELET # BLD AUTO: 369 K/UL (ref 150–450)
PMV BLD AUTO: 10.4 FL (ref 9.2–12.9)
POTASSIUM SERPL-SCNC: 4.7 MMOL/L (ref 3.5–5.1)
PROT SERPL-MCNC: 7.4 G/DL (ref 6–8.4)
RBC # BLD AUTO: 5.15 M/UL (ref 4–5.4)
SODIUM SERPL-SCNC: 142 MMOL/L (ref 136–145)
TRIGL SERPL-MCNC: 135 MG/DL (ref 30–150)
TSH SERPL DL<=0.005 MIU/L-ACNC: 2.1 UIU/ML (ref 0.4–4)
WBC # BLD AUTO: 8.37 K/UL (ref 3.9–12.7)

## 2021-06-29 PROCEDURE — 99396 PR PREVENTIVE VISIT,EST,40-64: ICD-10-PCS | Mod: S$GLB,,, | Performed by: FAMILY MEDICINE

## 2021-06-29 PROCEDURE — 99999 PR PBB SHADOW E&M-EST. PATIENT-LVL III: CPT | Mod: PBBFAC,,, | Performed by: FAMILY MEDICINE

## 2021-06-29 PROCEDURE — 85025 COMPLETE CBC W/AUTO DIFF WBC: CPT | Performed by: FAMILY MEDICINE

## 2021-06-29 PROCEDURE — 80061 LIPID PANEL: CPT | Performed by: FAMILY MEDICINE

## 2021-06-29 PROCEDURE — 84443 ASSAY THYROID STIM HORMONE: CPT | Performed by: FAMILY MEDICINE

## 2021-06-29 PROCEDURE — 1126F PR PAIN SEVERITY QUANTIFIED, NO PAIN PRESENT: ICD-10-PCS | Mod: S$GLB,,, | Performed by: FAMILY MEDICINE

## 2021-06-29 PROCEDURE — 1126F AMNT PAIN NOTED NONE PRSNT: CPT | Mod: S$GLB,,, | Performed by: FAMILY MEDICINE

## 2021-06-29 PROCEDURE — 36415 COLL VENOUS BLD VENIPUNCTURE: CPT | Mod: PO | Performed by: FAMILY MEDICINE

## 2021-06-29 PROCEDURE — 3008F PR BODY MASS INDEX (BMI) DOCUMENTED: ICD-10-PCS | Mod: CPTII,S$GLB,, | Performed by: FAMILY MEDICINE

## 2021-06-29 PROCEDURE — 99396 PREV VISIT EST AGE 40-64: CPT | Mod: S$GLB,,, | Performed by: FAMILY MEDICINE

## 2021-06-29 PROCEDURE — 3008F BODY MASS INDEX DOCD: CPT | Mod: CPTII,S$GLB,, | Performed by: FAMILY MEDICINE

## 2021-06-29 PROCEDURE — 99999 PR PBB SHADOW E&M-EST. PATIENT-LVL III: ICD-10-PCS | Mod: PBBFAC,,, | Performed by: FAMILY MEDICINE

## 2021-06-29 PROCEDURE — 80053 COMPREHEN METABOLIC PANEL: CPT | Performed by: FAMILY MEDICINE

## 2021-06-29 RX ORDER — HYDROCHLOROTHIAZIDE 12.5 MG/1
12.5 CAPSULE ORAL DAILY
Qty: 90 CAPSULE | Refills: 3 | Status: SHIPPED | OUTPATIENT
Start: 2021-06-29 | End: 2022-06-14

## 2021-06-29 RX ORDER — LOSARTAN POTASSIUM 25 MG/1
25 TABLET ORAL DAILY
Qty: 90 TABLET | Refills: 3 | Status: SHIPPED | OUTPATIENT
Start: 2021-06-29 | End: 2021-12-22 | Stop reason: SDUPTHER

## 2021-06-29 RX ORDER — LEVOTHYROXINE SODIUM 50 UG/1
50 TABLET ORAL DAILY
Qty: 90 TABLET | Refills: 3 | Status: SHIPPED | OUTPATIENT
Start: 2021-06-29 | End: 2022-06-14

## 2021-06-30 ENCOUNTER — PATIENT MESSAGE (OUTPATIENT)
Dept: ADMINISTRATIVE | Facility: OTHER | Age: 61
End: 2021-06-30

## 2021-07-13 ENCOUNTER — HOSPITAL ENCOUNTER (OUTPATIENT)
Dept: RADIOLOGY | Facility: HOSPITAL | Age: 61
Discharge: HOME | End: 2021-07-13
Attending: FAMILY MEDICINE
Payer: COMMERCIAL

## 2021-07-13 VITALS — BODY MASS INDEX: 35.71 KG/M2 | WEIGHT: 181.88 LBS | HEIGHT: 60 IN

## 2021-07-13 DIAGNOSIS — Z12.31 ENCOUNTER FOR SCREENING MAMMOGRAM FOR MALIGNANT NEOPLASM OF BREAST: ICD-10-CM

## 2021-07-13 PROCEDURE — 77067 MAMMO DIGITAL SCREENING BILAT WITH TOMO: ICD-10-PCS | Mod: 26,,, | Performed by: RADIOLOGY

## 2021-07-13 PROCEDURE — 77067 SCR MAMMO BI INCL CAD: CPT | Mod: TC

## 2021-07-13 PROCEDURE — 77067 SCR MAMMO BI INCL CAD: CPT | Mod: 26,,, | Performed by: RADIOLOGY

## 2021-07-13 PROCEDURE — 77063 BREAST TOMOSYNTHESIS BI: CPT | Mod: 26,,, | Performed by: RADIOLOGY

## 2021-07-13 PROCEDURE — 77063 MAMMO DIGITAL SCREENING BILAT WITH TOMO: ICD-10-PCS | Mod: 26,,, | Performed by: RADIOLOGY

## 2021-07-14 ENCOUNTER — PATIENT MESSAGE (OUTPATIENT)
Dept: OTHER | Facility: OTHER | Age: 61
End: 2021-07-14

## 2021-07-15 ENCOUNTER — PATIENT MESSAGE (OUTPATIENT)
Dept: OTHER | Facility: OTHER | Age: 61
End: 2021-07-15

## 2021-09-10 ENCOUNTER — PATIENT MESSAGE (OUTPATIENT)
Dept: FAMILY MEDICINE | Facility: CLINIC | Age: 61
End: 2021-09-10

## 2021-10-27 ENCOUNTER — PATIENT OUTREACH (OUTPATIENT)
Dept: ADMINISTRATIVE | Facility: OTHER | Age: 61
End: 2021-10-27
Payer: COMMERCIAL

## 2021-10-28 ENCOUNTER — OFFICE VISIT (OUTPATIENT)
Dept: OPHTHALMOLOGY | Facility: CLINIC | Age: 61
End: 2021-10-28
Payer: COMMERCIAL

## 2021-10-28 ENCOUNTER — IMMUNIZATION (OUTPATIENT)
Dept: INTERNAL MEDICINE | Facility: CLINIC | Age: 61
End: 2021-10-28
Attending: FAMILY MEDICINE
Payer: COMMERCIAL

## 2021-10-28 DIAGNOSIS — I10 ESSENTIAL HYPERTENSION: ICD-10-CM

## 2021-10-28 DIAGNOSIS — H52.4 BILATERAL PRESBYOPIA: ICD-10-CM

## 2021-10-28 DIAGNOSIS — H52.13 MYOPIA, BILATERAL: ICD-10-CM

## 2021-10-28 DIAGNOSIS — H25.13 CATARACT, NUCLEAR SCLEROTIC SENILE, BILATERAL: Primary | ICD-10-CM

## 2021-10-28 PROCEDURE — 92014 PR EYE EXAM, EST PATIENT,COMPREHESV: ICD-10-PCS | Mod: S$GLB,,, | Performed by: OPTOMETRIST

## 2021-10-28 PROCEDURE — 92014 COMPRE OPH EXAM EST PT 1/>: CPT | Mod: S$GLB,,, | Performed by: OPTOMETRIST

## 2021-10-28 PROCEDURE — 90694 VACC AIIV4 NO PRSRV 0.5ML IM: CPT | Mod: S$GLB,,, | Performed by: FAMILY MEDICINE

## 2021-10-28 PROCEDURE — 90471 IMMUNIZATION ADMIN: CPT | Mod: S$GLB,,, | Performed by: FAMILY MEDICINE

## 2021-10-28 PROCEDURE — 99999 PR PBB SHADOW E&M-EST. PATIENT-LVL II: CPT | Mod: PBBFAC,,, | Performed by: OPTOMETRIST

## 2021-10-28 PROCEDURE — 92015 PR REFRACTION: ICD-10-PCS | Mod: S$GLB,,, | Performed by: OPTOMETRIST

## 2021-10-28 PROCEDURE — 92015 DETERMINE REFRACTIVE STATE: CPT | Mod: S$GLB,,, | Performed by: OPTOMETRIST

## 2021-10-28 PROCEDURE — 4010F ACE/ARB THERAPY RXD/TAKEN: CPT | Mod: CPTII,S$GLB,, | Performed by: OPTOMETRIST

## 2021-10-28 PROCEDURE — 1159F MED LIST DOCD IN RCRD: CPT | Mod: CPTII,S$GLB,, | Performed by: OPTOMETRIST

## 2021-10-28 PROCEDURE — 4010F PR ACE/ARB THEARPY RXD/TAKEN: ICD-10-PCS | Mod: CPTII,S$GLB,, | Performed by: OPTOMETRIST

## 2021-10-28 PROCEDURE — 99999 PR PBB SHADOW E&M-EST. PATIENT-LVL II: ICD-10-PCS | Mod: PBBFAC,,, | Performed by: OPTOMETRIST

## 2021-10-28 PROCEDURE — 1159F PR MEDICATION LIST DOCUMENTED IN MEDICAL RECORD: ICD-10-PCS | Mod: CPTII,S$GLB,, | Performed by: OPTOMETRIST

## 2021-10-28 PROCEDURE — 90694 FLU VACCINE - QUADRIVALENT - ADJUVANTED: ICD-10-PCS | Mod: S$GLB,,, | Performed by: FAMILY MEDICINE

## 2021-10-28 PROCEDURE — 90471 FLU VACCINE - QUADRIVALENT - ADJUVANTED: ICD-10-PCS | Mod: S$GLB,,, | Performed by: FAMILY MEDICINE

## 2021-11-17 ENCOUNTER — IMMUNIZATION (OUTPATIENT)
Dept: PHARMACY | Facility: CLINIC | Age: 61
End: 2021-11-17
Payer: COMMERCIAL

## 2021-11-17 DIAGNOSIS — Z23 NEED FOR VACCINATION: Primary | ICD-10-CM

## 2022-02-15 ENCOUNTER — PATIENT MESSAGE (OUTPATIENT)
Dept: ADMINISTRATIVE | Facility: OTHER | Age: 62
End: 2022-02-15
Payer: COMMERCIAL

## 2022-03-16 DIAGNOSIS — I10 ESSENTIAL HYPERTENSION: ICD-10-CM

## 2022-03-16 RX ORDER — LOSARTAN POTASSIUM 25 MG/1
25 TABLET ORAL DAILY
Qty: 90 TABLET | Refills: 0 | Status: SHIPPED | OUTPATIENT
Start: 2022-03-16 | End: 2022-03-17 | Stop reason: SDUPTHER

## 2022-03-16 NOTE — TELEPHONE ENCOUNTER
No new care gaps identified.  Powered by [employer]. Reference number: [id number].   3/16/2022 2:59:49 PM CDT

## 2022-04-19 ENCOUNTER — PATIENT MESSAGE (OUTPATIENT)
Dept: OTHER | Facility: OTHER | Age: 62
End: 2022-04-19
Payer: COMMERCIAL

## 2022-05-02 ENCOUNTER — PATIENT MESSAGE (OUTPATIENT)
Dept: ADMINISTRATIVE | Facility: HOSPITAL | Age: 62
End: 2022-05-02
Payer: COMMERCIAL

## 2022-06-13 DIAGNOSIS — I10 ESSENTIAL HYPERTENSION: ICD-10-CM

## 2022-06-13 NOTE — TELEPHONE ENCOUNTER
Care Due:                  Date            Visit Type   Department     Provider  --------------------------------------------------------------------------------                                MYCHART                              ANNUAL                              CHECKUP/PHY  [facility]  Last Visit: 06-      S            MEDICINE       Mercy Fitzpatrick                              MYCHART                              ANNUAL                              CHECKUP/PHY  [facility]  Next Visit: 06-      S            MEDICINE       Mercy CALHOUN  [place]                                                            Last  Test          Frequency    Reason                     Performed    Due Date  --------------------------------------------------------------------------------    CMP.........  12 months..  hydroCHLOROthiazide,       06- 06-                             losartan.................    TSH.........  12 months..  levothyroxine............  06- 06-    [employer] Embedded Care Gaps. Reference number: [id number]. 6/13/2022   4:17:56 PM CDT

## 2022-06-14 RX ORDER — LEVOTHYROXINE SODIUM 50 UG/1
TABLET ORAL
Qty: 90 TABLET | Refills: 0 | Status: SHIPPED | OUTPATIENT
Start: 2022-06-14 | End: 2022-09-11

## 2022-06-14 RX ORDER — HYDROCHLOROTHIAZIDE 12.5 MG/1
CAPSULE ORAL
Qty: 90 CAPSULE | Refills: 0 | Status: SHIPPED | OUTPATIENT
Start: 2022-06-14 | End: 2022-09-06

## 2022-06-14 NOTE — TELEPHONE ENCOUNTER
Refill Authorization Note   Fatuma Akins  is requesting a refill authorization.  Brief Assessment and Rationale for Refill:  Approve    -Medication-Related Problems Identified: Requires labs  Medication Therapy Plan:  TSH within normal limits    Medication Reconciliation Completed: No   Comments:     Provider Staff:     Action is required for this patient.   Please see care gap opportunities below in Care Due Message.     Thanks!  [facility]     Appointments      Date Provider   Last Visit   6/29/2021 Mercy Fitzpatrick MD   Next Visit   6/30/2022 Mercy Fitzpatrick MD     Note composed:12:08 PM 06/14/2022           Note composed:12:07 PM 06/14/2022

## 2022-06-26 ENCOUNTER — PATIENT MESSAGE (OUTPATIENT)
Dept: FAMILY MEDICINE | Facility: CLINIC | Age: 62
End: 2022-06-26
Payer: COMMERCIAL

## 2022-06-26 DIAGNOSIS — R73.9 ELEVATED BLOOD SUGAR: ICD-10-CM

## 2022-06-26 DIAGNOSIS — Z00.00 PREVENTATIVE HEALTH CARE: Primary | ICD-10-CM

## 2022-06-29 ENCOUNTER — LAB VISIT (OUTPATIENT)
Dept: LAB | Facility: HOSPITAL | Age: 62
End: 2022-06-29
Attending: FAMILY MEDICINE
Payer: COMMERCIAL

## 2022-06-29 DIAGNOSIS — R73.9 ELEVATED BLOOD SUGAR: ICD-10-CM

## 2022-06-29 DIAGNOSIS — Z00.00 PREVENTATIVE HEALTH CARE: ICD-10-CM

## 2022-06-29 LAB
ALBUMIN SERPL BCP-MCNC: 4.4 G/DL (ref 3.5–5.2)
ALP SERPL-CCNC: 63 U/L (ref 55–135)
ALT SERPL W/O P-5'-P-CCNC: 23 U/L (ref 10–44)
ANION GAP SERPL CALC-SCNC: 11 MMOL/L (ref 8–16)
AST SERPL-CCNC: 25 U/L (ref 10–40)
BASOPHILS # BLD AUTO: 0.07 K/UL (ref 0–0.2)
BASOPHILS NFR BLD: 0.8 % (ref 0–1.9)
BILIRUB SERPL-MCNC: 0.6 MG/DL (ref 0.1–1)
BUN SERPL-MCNC: 9 MG/DL (ref 8–23)
CALCIUM SERPL-MCNC: 9.9 MG/DL (ref 8.7–10.5)
CHLORIDE SERPL-SCNC: 103 MMOL/L (ref 95–110)
CHOLEST SERPL-MCNC: 186 MG/DL (ref 120–199)
CHOLEST/HDLC SERPL: 3.3 {RATIO} (ref 2–5)
CO2 SERPL-SCNC: 26 MMOL/L (ref 23–29)
CREAT SERPL-MCNC: 1 MG/DL (ref 0.5–1.4)
DIFFERENTIAL METHOD: ABNORMAL
EOSINOPHIL # BLD AUTO: 0.1 K/UL (ref 0–0.5)
EOSINOPHIL NFR BLD: 1.6 % (ref 0–8)
ERYTHROCYTE [DISTWIDTH] IN BLOOD BY AUTOMATED COUNT: 13.9 % (ref 11.5–14.5)
EST. GFR  (AFRICAN AMERICAN): >60 ML/MIN/1.73 M^2
EST. GFR  (NON AFRICAN AMERICAN): >60 ML/MIN/1.73 M^2
ESTIMATED AVG GLUCOSE: 120 MG/DL (ref 68–131)
GLUCOSE SERPL-MCNC: 97 MG/DL (ref 70–110)
HBA1C MFR BLD: 5.8 % (ref 4–5.6)
HCT VFR BLD AUTO: 44.5 % (ref 37–48.5)
HDLC SERPL-MCNC: 57 MG/DL (ref 40–75)
HDLC SERPL: 30.6 % (ref 20–50)
HGB BLD-MCNC: 13.9 G/DL (ref 12–16)
IMM GRANULOCYTES # BLD AUTO: 0.03 K/UL (ref 0–0.04)
IMM GRANULOCYTES NFR BLD AUTO: 0.3 % (ref 0–0.5)
LDLC SERPL CALC-MCNC: 108.6 MG/DL (ref 63–159)
LYMPHOCYTES # BLD AUTO: 2.9 K/UL (ref 1–4.8)
LYMPHOCYTES NFR BLD: 32.8 % (ref 18–48)
MCH RBC QN AUTO: 29.3 PG (ref 27–31)
MCHC RBC AUTO-ENTMCNC: 31.2 G/DL (ref 32–36)
MCV RBC AUTO: 94 FL (ref 82–98)
MONOCYTES # BLD AUTO: 0.8 K/UL (ref 0.3–1)
MONOCYTES NFR BLD: 8.6 % (ref 4–15)
NEUTROPHILS # BLD AUTO: 5 K/UL (ref 1.8–7.7)
NEUTROPHILS NFR BLD: 55.9 % (ref 38–73)
NONHDLC SERPL-MCNC: 129 MG/DL
NRBC BLD-RTO: 0 /100 WBC
PLATELET # BLD AUTO: 380 K/UL (ref 150–450)
PMV BLD AUTO: 9.8 FL (ref 9.2–12.9)
POTASSIUM SERPL-SCNC: 4.2 MMOL/L (ref 3.5–5.1)
PROT SERPL-MCNC: 7.1 G/DL (ref 6–8.4)
RBC # BLD AUTO: 4.75 M/UL (ref 4–5.4)
SODIUM SERPL-SCNC: 140 MMOL/L (ref 136–145)
TRIGL SERPL-MCNC: 102 MG/DL (ref 30–150)
TSH SERPL DL<=0.005 MIU/L-ACNC: 1.8 UIU/ML (ref 0.4–4)
WBC # BLD AUTO: 8.91 K/UL (ref 3.9–12.7)

## 2022-06-29 PROCEDURE — 83036 HEMOGLOBIN GLYCOSYLATED A1C: CPT | Performed by: FAMILY MEDICINE

## 2022-06-29 PROCEDURE — 85025 COMPLETE CBC W/AUTO DIFF WBC: CPT | Performed by: FAMILY MEDICINE

## 2022-06-29 PROCEDURE — 36415 COLL VENOUS BLD VENIPUNCTURE: CPT | Mod: PO | Performed by: FAMILY MEDICINE

## 2022-06-29 PROCEDURE — 84443 ASSAY THYROID STIM HORMONE: CPT | Performed by: FAMILY MEDICINE

## 2022-06-29 PROCEDURE — 80053 COMPREHEN METABOLIC PANEL: CPT | Performed by: FAMILY MEDICINE

## 2022-06-29 PROCEDURE — 80061 LIPID PANEL: CPT | Performed by: FAMILY MEDICINE

## 2022-06-30 ENCOUNTER — OFFICE VISIT (OUTPATIENT)
Dept: FAMILY MEDICINE | Facility: CLINIC | Age: 62
End: 2022-06-30
Payer: COMMERCIAL

## 2022-06-30 VITALS
WEIGHT: 180.13 LBS | DIASTOLIC BLOOD PRESSURE: 78 MMHG | BODY MASS INDEX: 35.37 KG/M2 | HEIGHT: 60 IN | TEMPERATURE: 97 F | SYSTOLIC BLOOD PRESSURE: 125 MMHG | OXYGEN SATURATION: 96 % | HEART RATE: 82 BPM

## 2022-06-30 DIAGNOSIS — I10 ESSENTIAL HYPERTENSION: Chronic | ICD-10-CM

## 2022-06-30 DIAGNOSIS — E78.1 HYPERTRIGLYCERIDEMIA: Chronic | ICD-10-CM

## 2022-06-30 DIAGNOSIS — Z12.31 ENCOUNTER FOR SCREENING MAMMOGRAM FOR MALIGNANT NEOPLASM OF BREAST: ICD-10-CM

## 2022-06-30 DIAGNOSIS — Z00.00 PREVENTATIVE HEALTH CARE: Primary | ICD-10-CM

## 2022-06-30 DIAGNOSIS — R73.03 PREDIABETES: ICD-10-CM

## 2022-06-30 DIAGNOSIS — E03.9 ACQUIRED HYPOTHYROIDISM: ICD-10-CM

## 2022-06-30 PROCEDURE — 99396 PREV VISIT EST AGE 40-64: CPT | Mod: S$GLB,,, | Performed by: FAMILY MEDICINE

## 2022-06-30 PROCEDURE — 4010F ACE/ARB THERAPY RXD/TAKEN: CPT | Mod: CPTII,S$GLB,, | Performed by: FAMILY MEDICINE

## 2022-06-30 PROCEDURE — 99999 PR PBB SHADOW E&M-EST. PATIENT-LVL V: CPT | Mod: PBBFAC,,, | Performed by: FAMILY MEDICINE

## 2022-06-30 PROCEDURE — 1159F MED LIST DOCD IN RCRD: CPT | Mod: CPTII,S$GLB,, | Performed by: FAMILY MEDICINE

## 2022-06-30 PROCEDURE — 3044F HG A1C LEVEL LT 7.0%: CPT | Mod: CPTII,S$GLB,, | Performed by: FAMILY MEDICINE

## 2022-06-30 PROCEDURE — 1160F PR REVIEW ALL MEDS BY PRESCRIBER/CLIN PHARMACIST DOCUMENTED: ICD-10-PCS | Mod: CPTII,S$GLB,, | Performed by: FAMILY MEDICINE

## 2022-06-30 PROCEDURE — 1159F PR MEDICATION LIST DOCUMENTED IN MEDICAL RECORD: ICD-10-PCS | Mod: CPTII,S$GLB,, | Performed by: FAMILY MEDICINE

## 2022-06-30 PROCEDURE — 3008F BODY MASS INDEX DOCD: CPT | Mod: CPTII,S$GLB,, | Performed by: FAMILY MEDICINE

## 2022-06-30 PROCEDURE — 4010F PR ACE/ARB THEARPY RXD/TAKEN: ICD-10-PCS | Mod: CPTII,S$GLB,, | Performed by: FAMILY MEDICINE

## 2022-06-30 PROCEDURE — 1160F RVW MEDS BY RX/DR IN RCRD: CPT | Mod: CPTII,S$GLB,, | Performed by: FAMILY MEDICINE

## 2022-06-30 PROCEDURE — 3008F PR BODY MASS INDEX (BMI) DOCUMENTED: ICD-10-PCS | Mod: CPTII,S$GLB,, | Performed by: FAMILY MEDICINE

## 2022-06-30 PROCEDURE — 3078F DIAST BP <80 MM HG: CPT | Mod: CPTII,S$GLB,, | Performed by: FAMILY MEDICINE

## 2022-06-30 PROCEDURE — 3074F PR MOST RECENT SYSTOLIC BLOOD PRESSURE < 130 MM HG: ICD-10-PCS | Mod: CPTII,S$GLB,, | Performed by: FAMILY MEDICINE

## 2022-06-30 PROCEDURE — 3078F PR MOST RECENT DIASTOLIC BLOOD PRESSURE < 80 MM HG: ICD-10-PCS | Mod: CPTII,S$GLB,, | Performed by: FAMILY MEDICINE

## 2022-06-30 PROCEDURE — 3044F PR MOST RECENT HEMOGLOBIN A1C LEVEL <7.0%: ICD-10-PCS | Mod: CPTII,S$GLB,, | Performed by: FAMILY MEDICINE

## 2022-06-30 PROCEDURE — 99396 PR PREVENTIVE VISIT,EST,40-64: ICD-10-PCS | Mod: S$GLB,,, | Performed by: FAMILY MEDICINE

## 2022-06-30 PROCEDURE — 99999 PR PBB SHADOW E&M-EST. PATIENT-LVL V: ICD-10-PCS | Mod: PBBFAC,,, | Performed by: FAMILY MEDICINE

## 2022-06-30 PROCEDURE — 3074F SYST BP LT 130 MM HG: CPT | Mod: CPTII,S$GLB,, | Performed by: FAMILY MEDICINE

## 2022-06-30 NOTE — PROGRESS NOTES
CHIEF COMPLAINT:  This is a 62-year-old female here for preventive health exam.     SUBJECTIVE: The patient is doing well without complaints.  Her blood pressure is controlled on losartan 25 mg daily and hydrochlorothiazide 12.5 mg daily.  Today's blood pressure reading is 125/78.  She takes levothyroxine for acquired hypothyroidism.  Patient is obese with a BMI of 35.18. Recent A1c was 5.8% indicating prediabetes.      Eye exam October 2021. Pap smear April 2019, due again in April 2024. Mammogram July 2021. Colonoscopy August 2019, due again in August 2024. TD booster April 2019. Flu vaccine October 2021.  COVID 19 vaccine February, March, November 2021.     ROS:  GENERAL: Patient denies fever, chills, night sweats. Patient denies weight gain. Patient denies anorexia, fatigue, weakness or swollen glands.  SKIN: Patient denies rash or hair loss.  HEENT: Patient denies sore throat, ear pain, hearing loss, nasal congestion, or runny nose. Patient denies visual disturbance, eye irritation or discharge.  LUNGS: Patient denies cough, wheeze or hemoptysis.  CARDIOVASCULAR: Patient denies chest pain, shortness of breath, palpitations, syncope or lower extremity edema.  GI: Patient denies abdominal pain, nausea, vomiting, diarrhea, constipation, blood in stool or melena.  GENITOURINARY: Patient denies pelvic pain, vaginal discharge, itch or odor. Patient denies irregular vaginal bleeding. Patient denies dysuria, frequency, hematuria, nocturia, urgency or incontinence.  BREASTS: Patient denies breast pain, mass or nipple discharge.  MUSCULOSKELETAL: Patient denies joint pain, swelling, redness or warmth.  NEUROLOGIC: Patient denies headache, vertigo, paresthesias, weakness in limb, dysarthria, dysphagia or abnormality of gait.  PSYCHIATRIC: Patient denies anxiety, depression, or memory loss.     OBJECTIVE:   GENERAL: Well-developed well-nourished, obese, white female alert and oriented x3, in no acute distress. Memory,  judgment and cognition without deficit.  SKIN: Clear without rash. Normal color and tone.  HEENT: Eyes: Clear conjunctivae. Pupils equal reactive to light and accommodation. Ears: Clear TMs. Clear canals. Nose: Without congestion. Pharynx: Without injection or exudates.  NECK: Supple, normal range of motion. No masses, lymphadenopathy or enlarged thyroid. No JVD. Carotids 2+ and equal. No bruits.  LUNGS: Clear to auscultation. Normal respiratory effort.  CARDIOVASCULAR: Regular rhythm, normal S1, S2 without murmur, gallop or rub.  BACK: No CVA or spinal tenderness.  BREASTS: No masses, tenderness or nipple discharge.  ABDOMEN: Normal appearance. Active bowel sounds. Soft, nontender without mass or organomegaly. No rebound or guarding.  EXTREMITIES: Without cyanosis, clubbing or edema. Distal pulses 2+ and equal. Normal range of motion in all extremities. No joint effusion, erythema or warmth.  Negative impingement sign.  Triggering of left middle finger.  NEUROLOGIC: Cranial nerves II through XII without deficit. Motor strength equal bilaterally. Sensation normal to touch. Deep tendon reflexes 2+ and equal. Gait without abnormality. No tremor. Negative cerebellar signs.  PELVIC: External: Without lesions or inflammation. Vaginal: Clear, atrophic changes. Cervix: Friable, atrophic. Nontender. No Pap. Uterus: Normal size and shape. Adnexa: Non-tender, without masses. Rectovaginal: Confirms, heme-negative stool x2.    ASSESSMENT:  1. Preventative health care    2. Essential hypertension    3. Acquired hypothyroidism    4. Hypertriglyceridemia    5. Prediabetes      PLAN:  1. Weight reduction. Exercise regularly.  2. Age-appropriate counseling.  3. Recent lab reviewed and acceptable except for prediabetes.  4. Mammogram.  5. Follow ADA diet.  6. Recheck A1c in 6 months.  7. Return to clinic annually.    This note is generated with speech recognition software and is subject to transcription error and sound alike phrases  that may be missed by proofreading.

## 2022-07-21 ENCOUNTER — HOSPITAL ENCOUNTER (OUTPATIENT)
Dept: RADIOLOGY | Facility: HOSPITAL | Age: 62
Discharge: HOME | End: 2022-07-21
Attending: FAMILY MEDICINE
Payer: COMMERCIAL

## 2022-07-21 VITALS — BODY MASS INDEX: 35.34 KG/M2 | HEIGHT: 60 IN | WEIGHT: 180 LBS

## 2022-07-21 DIAGNOSIS — Z12.31 ENCOUNTER FOR SCREENING MAMMOGRAM FOR MALIGNANT NEOPLASM OF BREAST: ICD-10-CM

## 2022-07-21 PROCEDURE — 77063 MAMMO DIGITAL SCREENING BILAT WITH TOMO: ICD-10-PCS | Mod: 26,,, | Performed by: RADIOLOGY

## 2022-07-21 PROCEDURE — 77067 SCR MAMMO BI INCL CAD: CPT | Mod: 26,,, | Performed by: RADIOLOGY

## 2022-07-21 PROCEDURE — 77063 BREAST TOMOSYNTHESIS BI: CPT | Mod: TC

## 2022-07-21 PROCEDURE — 77067 MAMMO DIGITAL SCREENING BILAT WITH TOMO: ICD-10-PCS | Mod: 26,,, | Performed by: RADIOLOGY

## 2022-07-21 PROCEDURE — 77067 SCR MAMMO BI INCL CAD: CPT | Mod: TC

## 2022-07-21 PROCEDURE — 77063 BREAST TOMOSYNTHESIS BI: CPT | Mod: 26,,, | Performed by: RADIOLOGY

## 2022-07-24 ENCOUNTER — PATIENT MESSAGE (OUTPATIENT)
Dept: FAMILY MEDICINE | Facility: CLINIC | Age: 62
End: 2022-07-24
Payer: COMMERCIAL

## 2022-10-06 ENCOUNTER — OFFICE VISIT (OUTPATIENT)
Dept: DERMATOLOGY | Facility: CLINIC | Age: 62
End: 2022-10-06
Payer: COMMERCIAL

## 2022-10-06 DIAGNOSIS — D18.01 HEMANGIOMA OF SKIN: ICD-10-CM

## 2022-10-06 DIAGNOSIS — L90.5 SCAR CONDITIONS/SKIN FIBROSIS: Primary | ICD-10-CM

## 2022-10-06 DIAGNOSIS — L57.0 ACTINIC KERATOSES: ICD-10-CM

## 2022-10-06 DIAGNOSIS — Z12.83 SCREENING, MALIGNANT NEOPLASM, SKIN: ICD-10-CM

## 2022-10-06 DIAGNOSIS — Z85.828 HISTORY OF SKIN CANCER: ICD-10-CM

## 2022-10-06 PROCEDURE — 1159F MED LIST DOCD IN RCRD: CPT | Mod: CPTII,S$GLB,, | Performed by: DERMATOLOGY

## 2022-10-06 PROCEDURE — 3044F HG A1C LEVEL LT 7.0%: CPT | Mod: CPTII,S$GLB,, | Performed by: DERMATOLOGY

## 2022-10-06 PROCEDURE — 4010F PR ACE/ARB THEARPY RXD/TAKEN: ICD-10-PCS | Mod: CPTII,S$GLB,, | Performed by: DERMATOLOGY

## 2022-10-06 PROCEDURE — 99999 PR PBB SHADOW E&M-EST. PATIENT-LVL III: CPT | Mod: PBBFAC,,, | Performed by: DERMATOLOGY

## 2022-10-06 PROCEDURE — 1159F PR MEDICATION LIST DOCUMENTED IN MEDICAL RECORD: ICD-10-PCS | Mod: CPTII,S$GLB,, | Performed by: DERMATOLOGY

## 2022-10-06 PROCEDURE — 1160F RVW MEDS BY RX/DR IN RCRD: CPT | Mod: CPTII,S$GLB,, | Performed by: DERMATOLOGY

## 2022-10-06 PROCEDURE — 1160F PR REVIEW ALL MEDS BY PRESCRIBER/CLIN PHARMACIST DOCUMENTED: ICD-10-PCS | Mod: CPTII,S$GLB,, | Performed by: DERMATOLOGY

## 2022-10-06 PROCEDURE — 99214 PR OFFICE/OUTPT VISIT, EST, LEVL IV, 30-39 MIN: ICD-10-PCS | Mod: 25,S$GLB,, | Performed by: DERMATOLOGY

## 2022-10-06 PROCEDURE — 99214 OFFICE O/P EST MOD 30 MIN: CPT | Mod: 25,S$GLB,, | Performed by: DERMATOLOGY

## 2022-10-06 PROCEDURE — 4010F ACE/ARB THERAPY RXD/TAKEN: CPT | Mod: CPTII,S$GLB,, | Performed by: DERMATOLOGY

## 2022-10-06 PROCEDURE — 99999 PR PBB SHADOW E&M-EST. PATIENT-LVL III: ICD-10-PCS | Mod: PBBFAC,,, | Performed by: DERMATOLOGY

## 2022-10-06 PROCEDURE — 17000 PR DESTRUCTION(LASER SURGERY,CRYOSURGERY,CHEMOSURGERY),PREMALIGNANT LESIONS,FIRST LESION: ICD-10-PCS | Mod: S$GLB,,, | Performed by: DERMATOLOGY

## 2022-10-06 PROCEDURE — 17000 DESTRUCT PREMALG LESION: CPT | Mod: S$GLB,,, | Performed by: DERMATOLOGY

## 2022-10-06 PROCEDURE — 3044F PR MOST RECENT HEMOGLOBIN A1C LEVEL <7.0%: ICD-10-PCS | Mod: CPTII,S$GLB,, | Performed by: DERMATOLOGY

## 2022-10-06 NOTE — PROGRESS NOTES
Subjective:       Patient ID:  Fatuma Akins is a 62 y.o. female who presents for   Chief Complaint   Patient presents with    Skin Check     Hx of NMSC of the nose (s/p Mohs by Dr. Elizabeth > 10 years ago) and SK's, last seen on 8/25/20.  Here today for annual skin check. Denies bleeding, tender, growing, or concerning lesions.     This is a high risk patient here to check for the development of new lesions.            Review of Systems   Constitutional:  Negative for fever and chills.   Gastrointestinal:  Negative for nausea and vomiting.   Skin:  Positive for activity-related sunscreen use. Negative for daily sunscreen use and recent sunburn.   Hematologic/Lymphatic: Does not bruise/bleed easily.      Objective:    Physical Exam   Constitutional: She appears well-developed and well-nourished. No distress.   Neurological: She is alert and oriented to person, place, and time. She is not disoriented.   Psychiatric: She has a normal mood and affect.   Skin:   Areas Examined (abnormalities noted in diagram):   Scalp / Hair Palpated and Inspected  Head / Face Inspection Performed  Neck Inspection Performed  Chest / Axilla Inspection Performed  Abdomen Inspection Performed  Genitals / Buttocks / Groin Inspection Performed  Back Inspection Performed  RUE Inspected  LUE Inspection Performed  RLE Inspected  LLE Inspection Performed  Nails and Digits Inspection Performed                 Diagram Legend     Erythematous scaling macule/papule c/w actinic keratosis       Vascular papule c/w angioma      Pigmented verrucoid papule/plaque c/w seborrheic keratosis      Yellow umbilicated papule c/w sebaceous hyperplasia      Irregularly shaped tan macule c/w lentigo     1-2 mm smooth white papules consistent with Milia      Movable subcutaneous cyst with punctum c/w epidermal inclusion cyst      Subcutaneous movable cyst c/w pilar cyst      Firm pink to brown papule c/w dermatofibroma      Pedunculated fleshy papule(s) c/w skin  tag(s)      Evenly pigmented macule c/w junctional nevus     Mildly variegated pigmented, slightly irregular-bordered macule c/w mildly atypical nevus      Flesh colored to evenly pigmented papule c/w intradermal nevus       Pink pearly papule/plaque c/w basal cell carcinoma      Erythematous hyperkeratotic cursted plaque c/w SCC      Surgical scar with no sign of skin cancer recurrence      Open and closed comedones      Inflammatory papules and pustules      Verrucoid papule consistent consistent with wart     Erythematous eczematous patches and plaques     Dystrophic onycholytic nail with subungual debris c/w onychomycosis     Umbilicated papule    Erythematous-base heme-crusted tan verrucoid plaque consistent with inflamed seborrheic keratosis     Erythematous Silvery Scaling Plaque c/w Psoriasis     See annotation      Assessment / Plan:        Scar conditions/skin fibrosis  Screening, malignant neoplasm, skin  History of skin cancer  Scar of the nose, hx of NMSC.  No evidence of recurrence on physical exam today.  Continue routine skin surveillance. Daily sunscreen advised.    Hemangioma of skin  Reassurance given.  Lesions are benign.    Actinic keratoses  Cryosurgery Procedure Note    The patient is informed of the precancerous quality and need for treatment of these lesions. After risks, benefits and alternatives explained, including blistering, pain, hyper- and hypopigmentation, patient verbally consents to cryotherapy to precancerous lesions. Liquid nitrogen cryosurgery is applied to the 1 actinic keratoses, as detailed in the physical exam, to produce a freeze injury. The patient is aware that blisters may form and is instructed on wound care with gentle cleansing and use of vaseline ointment to keep moist until healed. The patient is supplied a handout on cryosurgery and is instructed to call if lesions do not completely resolve.           Follow up in about 1 year (around 10/6/2023).

## 2022-10-06 NOTE — PATIENT INSTRUCTIONS

## 2022-10-07 ENCOUNTER — OFFICE VISIT (OUTPATIENT)
Dept: OPHTHALMOLOGY | Facility: CLINIC | Age: 62
End: 2022-10-07
Payer: COMMERCIAL

## 2022-10-07 ENCOUNTER — IMMUNIZATION (OUTPATIENT)
Dept: INTERNAL MEDICINE | Facility: CLINIC | Age: 62
End: 2022-10-07
Payer: COMMERCIAL

## 2022-10-07 ENCOUNTER — PATIENT MESSAGE (OUTPATIENT)
Dept: OPHTHALMOLOGY | Facility: CLINIC | Age: 62
End: 2022-10-07

## 2022-10-07 DIAGNOSIS — H52.7 REFRACTIVE ERRORS: ICD-10-CM

## 2022-10-07 DIAGNOSIS — H25.13 CATARACT, NUCLEAR SCLEROTIC SENILE, BILATERAL: Primary | ICD-10-CM

## 2022-10-07 DIAGNOSIS — I10 ESSENTIAL HYPERTENSION: ICD-10-CM

## 2022-10-07 PROCEDURE — 92015 DETERMINE REFRACTIVE STATE: CPT | Mod: S$GLB,,, | Performed by: OPTOMETRIST

## 2022-10-07 PROCEDURE — 4010F ACE/ARB THERAPY RXD/TAKEN: CPT | Mod: CPTII,S$GLB,, | Performed by: OPTOMETRIST

## 2022-10-07 PROCEDURE — 92015 PR REFRACTION: ICD-10-PCS | Mod: S$GLB,,, | Performed by: OPTOMETRIST

## 2022-10-07 PROCEDURE — 90471 FLU VACCINE (QUAD) GREATER THAN OR EQUAL TO 3YO PRESERVATIVE FREE IM: ICD-10-PCS | Mod: S$GLB,,, | Performed by: FAMILY MEDICINE

## 2022-10-07 PROCEDURE — 90686 IIV4 VACC NO PRSV 0.5 ML IM: CPT | Mod: S$GLB,,, | Performed by: FAMILY MEDICINE

## 2022-10-07 PROCEDURE — 90686 FLU VACCINE (QUAD) GREATER THAN OR EQUAL TO 3YO PRESERVATIVE FREE IM: ICD-10-PCS | Mod: S$GLB,,, | Performed by: FAMILY MEDICINE

## 2022-10-07 PROCEDURE — 92014 PR EYE EXAM, EST PATIENT,COMPREHESV: ICD-10-PCS | Mod: S$GLB,,, | Performed by: OPTOMETRIST

## 2022-10-07 PROCEDURE — 4010F PR ACE/ARB THEARPY RXD/TAKEN: ICD-10-PCS | Mod: CPTII,S$GLB,, | Performed by: OPTOMETRIST

## 2022-10-07 PROCEDURE — 1159F PR MEDICATION LIST DOCUMENTED IN MEDICAL RECORD: ICD-10-PCS | Mod: CPTII,S$GLB,, | Performed by: OPTOMETRIST

## 2022-10-07 PROCEDURE — 99999 PR PBB SHADOW E&M-EST. PATIENT-LVL III: ICD-10-PCS | Mod: PBBFAC,,, | Performed by: OPTOMETRIST

## 2022-10-07 PROCEDURE — 1159F MED LIST DOCD IN RCRD: CPT | Mod: CPTII,S$GLB,, | Performed by: OPTOMETRIST

## 2022-10-07 PROCEDURE — 3044F HG A1C LEVEL LT 7.0%: CPT | Mod: CPTII,S$GLB,, | Performed by: OPTOMETRIST

## 2022-10-07 PROCEDURE — 99999 PR PBB SHADOW E&M-EST. PATIENT-LVL III: CPT | Mod: PBBFAC,,, | Performed by: OPTOMETRIST

## 2022-10-07 PROCEDURE — 92014 COMPRE OPH EXAM EST PT 1/>: CPT | Mod: S$GLB,,, | Performed by: OPTOMETRIST

## 2022-10-07 PROCEDURE — 3044F PR MOST RECENT HEMOGLOBIN A1C LEVEL <7.0%: ICD-10-PCS | Mod: CPTII,S$GLB,, | Performed by: OPTOMETRIST

## 2022-10-07 PROCEDURE — 90471 IMMUNIZATION ADMIN: CPT | Mod: S$GLB,,, | Performed by: FAMILY MEDICINE

## 2022-10-07 NOTE — PROGRESS NOTES
HPI    No Visual Complaints  Last exam 10/28/2021 TRF.  Glasses broke x 3-4 months  Update glasses RX.    Last edited by Anabella Love MA on 10/7/2022 10:32 AM.            Assessment /Plan     For exam results, see Encounter Report.    Cataract, nuclear sclerotic senile, bilateral    Essential hypertension    Refractive errors      Minimal cataracts OU, not surgical    No HTN Retinopathy    Dispense Final Rx for glasses.  RTC 1 year  Discussed above and answered questions.

## 2022-12-12 ENCOUNTER — PATIENT MESSAGE (OUTPATIENT)
Dept: ADMINISTRATIVE | Facility: OTHER | Age: 62
End: 2022-12-12
Payer: COMMERCIAL

## 2022-12-18 ENCOUNTER — PATIENT MESSAGE (OUTPATIENT)
Dept: ADMINISTRATIVE | Facility: OTHER | Age: 62
End: 2022-12-18
Payer: COMMERCIAL

## 2023-02-16 ENCOUNTER — PATIENT MESSAGE (OUTPATIENT)
Dept: OTHER | Facility: OTHER | Age: 63
End: 2023-02-16
Payer: COMMERCIAL

## 2023-02-17 ENCOUNTER — PATIENT MESSAGE (OUTPATIENT)
Dept: OTHER | Facility: OTHER | Age: 63
End: 2023-02-17
Payer: COMMERCIAL

## 2023-06-27 ENCOUNTER — OFFICE VISIT (OUTPATIENT)
Dept: FAMILY MEDICINE | Facility: CLINIC | Age: 63
End: 2023-06-27
Payer: COMMERCIAL

## 2023-06-27 ENCOUNTER — LAB VISIT (OUTPATIENT)
Dept: LAB | Facility: HOSPITAL | Age: 63
End: 2023-06-27
Attending: FAMILY MEDICINE
Payer: COMMERCIAL

## 2023-06-27 VITALS
TEMPERATURE: 98 F | SYSTOLIC BLOOD PRESSURE: 124 MMHG | BODY MASS INDEX: 35.68 KG/M2 | HEIGHT: 60 IN | HEART RATE: 83 BPM | DIASTOLIC BLOOD PRESSURE: 78 MMHG | WEIGHT: 181.75 LBS | OXYGEN SATURATION: 98 %

## 2023-06-27 DIAGNOSIS — R73.03 PREDIABETES: ICD-10-CM

## 2023-06-27 DIAGNOSIS — G47.30 SLEEP APNEA, UNSPECIFIED TYPE: ICD-10-CM

## 2023-06-27 DIAGNOSIS — E03.9 ACQUIRED HYPOTHYROIDISM: ICD-10-CM

## 2023-06-27 DIAGNOSIS — Z00.00 PREVENTATIVE HEALTH CARE: Primary | ICD-10-CM

## 2023-06-27 DIAGNOSIS — I10 ESSENTIAL HYPERTENSION: Chronic | ICD-10-CM

## 2023-06-27 DIAGNOSIS — E78.1 HYPERTRIGLYCERIDEMIA: Chronic | ICD-10-CM

## 2023-06-27 DIAGNOSIS — Z00.00 PREVENTATIVE HEALTH CARE: ICD-10-CM

## 2023-06-27 DIAGNOSIS — Z23 NEED FOR VACCINATION: ICD-10-CM

## 2023-06-27 DIAGNOSIS — Z12.31 ENCOUNTER FOR SCREENING MAMMOGRAM FOR MALIGNANT NEOPLASM OF BREAST: ICD-10-CM

## 2023-06-27 DIAGNOSIS — E66.9 OBESITY (BMI 30-39.9): ICD-10-CM

## 2023-06-27 LAB
ALBUMIN SERPL BCP-MCNC: 4.5 G/DL (ref 3.5–5.2)
ALP SERPL-CCNC: 77 U/L (ref 55–135)
ALT SERPL W/O P-5'-P-CCNC: 25 U/L (ref 10–44)
ANION GAP SERPL CALC-SCNC: 13 MMOL/L (ref 8–16)
AST SERPL-CCNC: 35 U/L (ref 10–40)
BASOPHILS # BLD AUTO: 0.09 K/UL (ref 0–0.2)
BASOPHILS NFR BLD: 1.1 % (ref 0–1.9)
BILIRUB SERPL-MCNC: 0.8 MG/DL (ref 0.1–1)
BUN SERPL-MCNC: 10 MG/DL (ref 8–23)
CALCIUM SERPL-MCNC: 10 MG/DL (ref 8.7–10.5)
CHLORIDE SERPL-SCNC: 105 MMOL/L (ref 95–110)
CHOLEST SERPL-MCNC: 191 MG/DL (ref 120–199)
CHOLEST/HDLC SERPL: 3.1 {RATIO} (ref 2–5)
CO2 SERPL-SCNC: 24 MMOL/L (ref 23–29)
CREAT SERPL-MCNC: 0.9 MG/DL (ref 0.5–1.4)
DIFFERENTIAL METHOD: ABNORMAL
EOSINOPHIL # BLD AUTO: 0.3 K/UL (ref 0–0.5)
EOSINOPHIL NFR BLD: 3.7 % (ref 0–8)
ERYTHROCYTE [DISTWIDTH] IN BLOOD BY AUTOMATED COUNT: 13.3 % (ref 11.5–14.5)
EST. GFR  (NO RACE VARIABLE): >60 ML/MIN/1.73 M^2
ESTIMATED AVG GLUCOSE: 114 MG/DL (ref 68–131)
GLUCOSE SERPL-MCNC: 111 MG/DL (ref 70–110)
HBA1C MFR BLD: 5.6 % (ref 4–5.6)
HCT VFR BLD AUTO: 45.6 % (ref 37–48.5)
HDLC SERPL-MCNC: 62 MG/DL (ref 40–75)
HDLC SERPL: 32.5 % (ref 20–50)
HGB BLD-MCNC: 14.4 G/DL (ref 12–16)
IMM GRANULOCYTES # BLD AUTO: 0.02 K/UL (ref 0–0.04)
IMM GRANULOCYTES NFR BLD AUTO: 0.2 % (ref 0–0.5)
LDLC SERPL CALC-MCNC: 103.6 MG/DL (ref 63–159)
LYMPHOCYTES # BLD AUTO: 2.8 K/UL (ref 1–4.8)
LYMPHOCYTES NFR BLD: 33.4 % (ref 18–48)
MCH RBC QN AUTO: 28.4 PG (ref 27–31)
MCHC RBC AUTO-ENTMCNC: 31.6 G/DL (ref 32–36)
MCV RBC AUTO: 90 FL (ref 82–98)
MONOCYTES # BLD AUTO: 0.7 K/UL (ref 0.3–1)
MONOCYTES NFR BLD: 8.9 % (ref 4–15)
NEUTROPHILS # BLD AUTO: 4.4 K/UL (ref 1.8–7.7)
NEUTROPHILS NFR BLD: 52.7 % (ref 38–73)
NONHDLC SERPL-MCNC: 129 MG/DL
NRBC BLD-RTO: 0 /100 WBC
PLATELET # BLD AUTO: 400 K/UL (ref 150–450)
PMV BLD AUTO: 9.5 FL (ref 9.2–12.9)
POTASSIUM SERPL-SCNC: 4.9 MMOL/L (ref 3.5–5.1)
PROT SERPL-MCNC: 7.3 G/DL (ref 6–8.4)
RBC # BLD AUTO: 5.07 M/UL (ref 4–5.4)
SODIUM SERPL-SCNC: 142 MMOL/L (ref 136–145)
TRIGL SERPL-MCNC: 127 MG/DL (ref 30–150)
TSH SERPL DL<=0.005 MIU/L-ACNC: 2.03 UIU/ML (ref 0.4–4)
WBC # BLD AUTO: 8.36 K/UL (ref 3.9–12.7)

## 2023-06-27 PROCEDURE — 83036 HEMOGLOBIN GLYCOSYLATED A1C: CPT | Performed by: FAMILY MEDICINE

## 2023-06-27 PROCEDURE — 99396 PR PREVENTIVE VISIT,EST,40-64: ICD-10-PCS | Mod: 25,S$GLB,, | Performed by: FAMILY MEDICINE

## 2023-06-27 PROCEDURE — 36415 COLL VENOUS BLD VENIPUNCTURE: CPT | Mod: PO | Performed by: FAMILY MEDICINE

## 2023-06-27 PROCEDURE — 80061 LIPID PANEL: CPT | Performed by: FAMILY MEDICINE

## 2023-06-27 PROCEDURE — 90750 ZOSTER RECOMBINANT VACCINE: ICD-10-PCS | Mod: S$GLB,,, | Performed by: FAMILY MEDICINE

## 2023-06-27 PROCEDURE — 80053 COMPREHEN METABOLIC PANEL: CPT | Performed by: FAMILY MEDICINE

## 2023-06-27 PROCEDURE — 1159F MED LIST DOCD IN RCRD: CPT | Mod: CPTII,S$GLB,, | Performed by: FAMILY MEDICINE

## 2023-06-27 PROCEDURE — 3078F PR MOST RECENT DIASTOLIC BLOOD PRESSURE < 80 MM HG: ICD-10-PCS | Mod: CPTII,S$GLB,, | Performed by: FAMILY MEDICINE

## 2023-06-27 PROCEDURE — 84443 ASSAY THYROID STIM HORMONE: CPT | Performed by: FAMILY MEDICINE

## 2023-06-27 PROCEDURE — 3008F BODY MASS INDEX DOCD: CPT | Mod: CPTII,S$GLB,, | Performed by: FAMILY MEDICINE

## 2023-06-27 PROCEDURE — 4010F ACE/ARB THERAPY RXD/TAKEN: CPT | Mod: CPTII,S$GLB,, | Performed by: FAMILY MEDICINE

## 2023-06-27 PROCEDURE — 99999 PR PBB SHADOW E&M-EST. PATIENT-LVL IV: ICD-10-PCS | Mod: PBBFAC,,, | Performed by: FAMILY MEDICINE

## 2023-06-27 PROCEDURE — 3008F PR BODY MASS INDEX (BMI) DOCUMENTED: ICD-10-PCS | Mod: CPTII,S$GLB,, | Performed by: FAMILY MEDICINE

## 2023-06-27 PROCEDURE — 99999 PR PBB SHADOW E&M-EST. PATIENT-LVL IV: CPT | Mod: PBBFAC,,, | Performed by: FAMILY MEDICINE

## 2023-06-27 PROCEDURE — 1159F PR MEDICATION LIST DOCUMENTED IN MEDICAL RECORD: ICD-10-PCS | Mod: CPTII,S$GLB,, | Performed by: FAMILY MEDICINE

## 2023-06-27 PROCEDURE — 90471 ZOSTER RECOMBINANT VACCINE: ICD-10-PCS | Mod: S$GLB,,, | Performed by: FAMILY MEDICINE

## 2023-06-27 PROCEDURE — 3074F PR MOST RECENT SYSTOLIC BLOOD PRESSURE < 130 MM HG: ICD-10-PCS | Mod: CPTII,S$GLB,, | Performed by: FAMILY MEDICINE

## 2023-06-27 PROCEDURE — 99396 PREV VISIT EST AGE 40-64: CPT | Mod: 25,S$GLB,, | Performed by: FAMILY MEDICINE

## 2023-06-27 PROCEDURE — 4010F PR ACE/ARB THEARPY RXD/TAKEN: ICD-10-PCS | Mod: CPTII,S$GLB,, | Performed by: FAMILY MEDICINE

## 2023-06-27 PROCEDURE — 3074F SYST BP LT 130 MM HG: CPT | Mod: CPTII,S$GLB,, | Performed by: FAMILY MEDICINE

## 2023-06-27 PROCEDURE — 3078F DIAST BP <80 MM HG: CPT | Mod: CPTII,S$GLB,, | Performed by: FAMILY MEDICINE

## 2023-06-27 PROCEDURE — 90471 IMMUNIZATION ADMIN: CPT | Mod: S$GLB,,, | Performed by: FAMILY MEDICINE

## 2023-06-27 PROCEDURE — 90750 HZV VACC RECOMBINANT IM: CPT | Mod: S$GLB,,, | Performed by: FAMILY MEDICINE

## 2023-06-27 PROCEDURE — 85025 COMPLETE CBC W/AUTO DIFF WBC: CPT | Performed by: FAMILY MEDICINE

## 2023-06-27 NOTE — PROGRESS NOTES
CHIEF COMPLAINT:  This is a 63-year-old female here for preventive health exam.     SUBJECTIVE: The patient is doing well without complaints.  Her sister reports that she stops breathing while sleeping.  She also snores.  Patient would like to be checked for sleep apnea.  Her blood pressure is controlled on losartan 25 mg daily and hydrochlorothiazide 12.5 mg daily.  Today's blood pressure reading is 124/78. She takes levothyroxine for acquired hypothyroidism.  Patient is obese with a BMI of 35.50. Recent A1c was 5.8% indicating prediabetes.      Eye exam October 2022. Pap smear April 2019, due again in April 2024. Mammogram July 2022. Colonoscopy August 2019, due again in August 2024. TD booster April 2019. Flu vaccine October 2022.  COVID 19 vaccine February, March, November 2021.     ROS:  GENERAL: Patient denies fever, chills, night sweats. Patient denies weight gain. Patient denies anorexia, fatigue, weakness or swollen glands.  SKIN: Patient denies rash or hair loss.  HEENT: Patient denies sore throat, ear pain, hearing loss, nasal congestion, or runny nose. Patient denies visual disturbance, eye irritation or discharge.  LUNGS: Patient denies cough, wheeze or hemoptysis.  CARDIOVASCULAR: Patient denies chest pain, shortness of breath, palpitations, syncope or lower extremity edema.  GI: Patient denies abdominal pain, nausea, vomiting, diarrhea, constipation, blood in stool or melena.  GENITOURINARY: Patient denies pelvic pain, vaginal discharge, itch or odor. Patient denies irregular vaginal bleeding. Patient denies dysuria, frequency, hematuria, nocturia, urgency or incontinence.  BREASTS: Patient denies breast pain, mass or nipple discharge.  MUSCULOSKELETAL: Patient denies joint pain, swelling, redness or warmth.  NEUROLOGIC: Patient denies headache, vertigo, paresthesias, weakness in limb, dysarthria, dysphagia or abnormality of gait.  PSYCHIATRIC: Patient denies anxiety, depression, or memory loss.      OBJECTIVE:   GENERAL: Well-developed well-nourished, obese, white female alert and oriented x3, in no acute distress. Memory, judgment and cognition without deficit.  SKIN: Clear without rash. Normal color and tone.  HEENT: Eyes: Clear conjunctivae. Pupils equal reactive to light and accommodation. Ears: Clear TMs. Clear canals. Nose: Without congestion. Pharynx: Without injection or exudates.  NECK: Supple, normal range of motion. No masses, lymphadenopathy or enlarged thyroid. No JVD. Carotids 2+ and equal. No bruits.  LUNGS: Clear to auscultation. Normal respiratory effort.  CARDIOVASCULAR: Regular rhythm, normal S1, S2 without murmur, gallop or rub.  BACK: No CVA or spinal tenderness.  BREASTS: No masses, tenderness or nipple discharge.  ABDOMEN: Normal appearance. Active bowel sounds. Soft, nontender without mass or organomegaly. No rebound or guarding.  EXTREMITIES: Without cyanosis, clubbing or edema. Distal pulses 2+ and equal. Normal range of motion in all extremities. No joint effusion, erythema or warmth.  Negative impingement sign.  Triggering of left middle finger.  NEUROLOGIC: Cranial nerves II through XII without deficit. Motor strength equal bilaterally. Sensation normal to touch. Deep tendon reflexes 2+ and equal. Gait without abnormality. No tremor. Negative cerebellar signs.  PELVIC: External: Without lesions or inflammation. Vaginal: Clear, atrophic changes. Cervix: Friable, atrophic. Nontender. No Pap. Uterus: Normal size and shape. Adnexa: Non-tender, without masses. Rectovaginal: Confirms, heme-negative stool x2.    ASSESSMENT:  1. Preventative health care    2. Essential hypertension    3. Acquired hypothyroidism    4. Hypertriglyceridemia    5. Obesity (BMI 30-39.9)    6. Sleep apnea, unspecified type    7. Prediabetes    8. Need for vaccination    9. Encounter for screening mammogram for malignant neoplasm of breast      PLAN:  1. Weight reduction. Exercise regularly.  2. Age-appropriate  counseling.  3. Fasting lab.  4. Mammogram.  5. Shingrix vaccine.  Return to clinic in 2-6 months for booster.  6. Pulmonary consult.  7. Refill medications as needed.  8. Follow-up annually.      This note is generated with speech recognition software and is subject to transcription error and sound alike phrases that may be missed by proofreading.

## 2023-06-30 ENCOUNTER — OFFICE VISIT (OUTPATIENT)
Dept: PULMONOLOGY | Facility: CLINIC | Age: 63
End: 2023-06-30
Payer: COMMERCIAL

## 2023-06-30 VITALS
BODY MASS INDEX: 35.01 KG/M2 | HEIGHT: 61 IN | SYSTOLIC BLOOD PRESSURE: 124 MMHG | WEIGHT: 185.44 LBS | RESPIRATION RATE: 18 BRPM | HEART RATE: 76 BPM | OXYGEN SATURATION: 94 % | DIASTOLIC BLOOD PRESSURE: 72 MMHG

## 2023-06-30 DIAGNOSIS — I10 ESSENTIAL HYPERTENSION: Chronic | ICD-10-CM

## 2023-06-30 DIAGNOSIS — E66.9 OBESITY (BMI 30-39.9): ICD-10-CM

## 2023-06-30 DIAGNOSIS — G47.30 SLEEP APNEA, UNSPECIFIED TYPE: ICD-10-CM

## 2023-06-30 DIAGNOSIS — G47.33 OSA (OBSTRUCTIVE SLEEP APNEA): ICD-10-CM

## 2023-06-30 DIAGNOSIS — E78.1 HYPERTRIGLYCERIDEMIA: Primary | Chronic | ICD-10-CM

## 2023-06-30 DIAGNOSIS — R73.03 PREDIABETES: ICD-10-CM

## 2023-06-30 DIAGNOSIS — E03.9 ACQUIRED HYPOTHYROIDISM: ICD-10-CM

## 2023-06-30 PROCEDURE — 4010F PR ACE/ARB THEARPY RXD/TAKEN: ICD-10-PCS | Mod: CPTII,S$GLB,, | Performed by: INTERNAL MEDICINE

## 2023-06-30 PROCEDURE — 1160F PR REVIEW ALL MEDS BY PRESCRIBER/CLIN PHARMACIST DOCUMENTED: ICD-10-PCS | Mod: CPTII,S$GLB,, | Performed by: INTERNAL MEDICINE

## 2023-06-30 PROCEDURE — 99999 PR PBB SHADOW E&M-EST. PATIENT-LVL V: CPT | Mod: PBBFAC,,, | Performed by: INTERNAL MEDICINE

## 2023-06-30 PROCEDURE — 99999 PR PBB SHADOW E&M-EST. PATIENT-LVL V: ICD-10-PCS | Mod: PBBFAC,,, | Performed by: INTERNAL MEDICINE

## 2023-06-30 PROCEDURE — 3008F BODY MASS INDEX DOCD: CPT | Mod: CPTII,S$GLB,, | Performed by: INTERNAL MEDICINE

## 2023-06-30 PROCEDURE — 3044F HG A1C LEVEL LT 7.0%: CPT | Mod: CPTII,S$GLB,, | Performed by: INTERNAL MEDICINE

## 2023-06-30 PROCEDURE — 3078F DIAST BP <80 MM HG: CPT | Mod: CPTII,S$GLB,, | Performed by: INTERNAL MEDICINE

## 2023-06-30 PROCEDURE — 1160F RVW MEDS BY RX/DR IN RCRD: CPT | Mod: CPTII,S$GLB,, | Performed by: INTERNAL MEDICINE

## 2023-06-30 PROCEDURE — 99204 OFFICE O/P NEW MOD 45 MIN: CPT | Mod: S$GLB,,, | Performed by: INTERNAL MEDICINE

## 2023-06-30 PROCEDURE — 3074F PR MOST RECENT SYSTOLIC BLOOD PRESSURE < 130 MM HG: ICD-10-PCS | Mod: CPTII,S$GLB,, | Performed by: INTERNAL MEDICINE

## 2023-06-30 PROCEDURE — 3044F PR MOST RECENT HEMOGLOBIN A1C LEVEL <7.0%: ICD-10-PCS | Mod: CPTII,S$GLB,, | Performed by: INTERNAL MEDICINE

## 2023-06-30 PROCEDURE — 3074F SYST BP LT 130 MM HG: CPT | Mod: CPTII,S$GLB,, | Performed by: INTERNAL MEDICINE

## 2023-06-30 PROCEDURE — 99204 PR OFFICE/OUTPT VISIT, NEW, LEVL IV, 45-59 MIN: ICD-10-PCS | Mod: S$GLB,,, | Performed by: INTERNAL MEDICINE

## 2023-06-30 PROCEDURE — 1159F PR MEDICATION LIST DOCUMENTED IN MEDICAL RECORD: ICD-10-PCS | Mod: CPTII,S$GLB,, | Performed by: INTERNAL MEDICINE

## 2023-06-30 PROCEDURE — 4010F ACE/ARB THERAPY RXD/TAKEN: CPT | Mod: CPTII,S$GLB,, | Performed by: INTERNAL MEDICINE

## 2023-06-30 PROCEDURE — 1159F MED LIST DOCD IN RCRD: CPT | Mod: CPTII,S$GLB,, | Performed by: INTERNAL MEDICINE

## 2023-06-30 PROCEDURE — 3008F PR BODY MASS INDEX (BMI) DOCUMENTED: ICD-10-PCS | Mod: CPTII,S$GLB,, | Performed by: INTERNAL MEDICINE

## 2023-06-30 PROCEDURE — 3078F PR MOST RECENT DIASTOLIC BLOOD PRESSURE < 80 MM HG: ICD-10-PCS | Mod: CPTII,S$GLB,, | Performed by: INTERNAL MEDICINE

## 2023-06-30 NOTE — ASSESSMENT & PLAN NOTE
Patient would benefit from weight loss and has tried to set realistic goals to achieve success. Lifestyle changes were discussed on eating healthy, exercising at least 150 minutes weekly, and reducing sedentary behavior.   Discussed the risk factors associated with obesity: Arthritis/CHRIST/Diabetes/Fatty Liver/Cardiovascular disease/GERD/HTN/HLP.

## 2023-06-30 NOTE — PROGRESS NOTES
Pulmonary Outpatient  Visit     Subjective:       Patient ID: Fatuma Akins is a 63 y.o. female.    Social History     Tobacco Use   Smoking Status Never   Smokeless Tobacco Never            Chief Complaint: Apnea      Fatuma Akins is 63 y.o.  Referred by Mercy Fitzpatrick MD  Concern about CHRIST  Snoring, gasping at night, apnea  Observed by family  Comorbidity: HTN, BMI, HLD,   Retired  Duration 2 years  Bed time Midnight  Wake time 7 am: sleep latency 15 minutes  Denies RLS or sleep attacks  No signs of narcolepsy  Caffinated beverages after 7 pm  Naps for 60 minutes  +ve for snoring,nocturnal sweating, gasp for air, wake up at night, sleep too little            Review of Systems   Constitutional:  Positive for fatigue.   Respiratory:  Positive for apnea, snoring and somnolence.    Psychiatric/Behavioral:  Positive for sleep disturbance.    All other systems reviewed and are negative.    Outpatient Encounter Medications as of 6/30/2023   Medication Sig Dispense Refill    ascorbate calcium (PABLITO-C ORAL)       ascorbic acid/multivit-min (EMERGEN-C IMMUNE PLUS ORAL)       calcium-vitamin D3 500 mg(1,250mg) -200 unit per tablet Take 1 tablet by mouth 2 (two) times daily with meals.      cholecalciferol, vitamin D3, 125 mcg (5,000 unit) Tab       coQ10, ubiquinol, 200 mg Cap Take by mouth.      fish oil-omega-3 fatty acids 300-1,000 mg capsule Take 2 g by mouth once daily.      levothyroxine (SYNTHROID) 50 MCG tablet TAKE ONE TABLET (50mg) BY MOUTH DAILY 90 tablet 0    losartan (COZAAR) 25 MG tablet TAKE ONE TABLET BY MOUTH EVERY DAY 90 tablet 0    MAGNESIUM CITRATE ORAL       multivitamin with minerals tablet Take 1 tablet by mouth once daily.      vit A/C/E ac/ZnOx/cupric oxide (EYE VITAMIN AND MINERALS ORAL) Take by mouth.       No facility-administered encounter medications on file as of 6/30/2023.       The following portions of the patient's history were reviewed and  updated as appropriate: She  has a past medical history of Atrophic vaginitis, Hypertension, Hypertriglyceridemia, Hypothyroidism, Obesity, and Prediabetes.  She does not have any pertinent problems on file.  She  has a past surgical history that includes Excision basal cell carcinoma and Colonoscopy (N/A, 8/1/2019).  Her family history includes Blindness in her mother; Breast cancer in her maternal aunt; Cataracts in her mother; Colon cancer in her maternal aunt and paternal aunt; Colon cancer (age of onset: 74) in her father; Diabetes in her mother and another family member; Hearing loss in her mother; Heart disease in her maternal grandfather, maternal grandmother, and paternal grandfather; Hypertension in her maternal aunt, mother, and sister; Insulin resistance in her sister; Kidney cancer in her maternal aunt; Retinal detachment in her mother; Thyroid disease in her sister.  She  reports that she has never smoked. She has never used smokeless tobacco. She reports current alcohol use of about 7.0 standard drinks per week. She reports that she does not use drugs.  She has a current medication list which includes the following prescription(s): ascorbate calcium, ascorbic acid/multivit-min, calcium-vitamin d3, cholecalciferol (vitamin d3), coq10 (ubiquinol), fish oil-omega-3 fatty acids, levothyroxine, losartan, magnesium citrate, multivitamin with minerals, and vit a/c/e ac/znox/cupric oxide.  Current Outpatient Medications on File Prior to Visit   Medication Sig Dispense Refill    ascorbate calcium (PABLITO-C ORAL)       ascorbic acid/multivit-min (EMERGEN-C IMMUNE PLUS ORAL)       calcium-vitamin D3 500 mg(1,250mg) -200 unit per tablet Take 1 tablet by mouth 2 (two) times daily with meals.      cholecalciferol, vitamin D3, 125 mcg (5,000 unit) Tab       coQ10, ubiquinol, 200 mg Cap Take by mouth.      fish oil-omega-3 fatty acids 300-1,000 mg capsule Take 2 g by mouth once daily.      levothyroxine (SYNTHROID) 50  "MCG tablet TAKE ONE TABLET (50mg) BY MOUTH DAILY 90 tablet 0    losartan (COZAAR) 25 MG tablet TAKE ONE TABLET BY MOUTH EVERY DAY 90 tablet 0    MAGNESIUM CITRATE ORAL       multivitamin with minerals tablet Take 1 tablet by mouth once daily.      vit A/C/E ac/ZnOx/cupric oxide (EYE VITAMIN AND MINERALS ORAL) Take by mouth.       No current facility-administered medications on file prior to visit.     She has No Known Allergies..      BP Readings from Last 3 Encounters:   06/30/23 124/72   06/27/23 124/78   06/30/22 125/78     Snoring / Sleep:     [place] Questionnaire (validated CHRIST screening questionnaire)    YES -- Snoring/apnea    YES -- Fatigue    Body mass index is 35.03 kg/m².  (>25 is overweight, >30 is obese)    Blood Pressure = Hypertension  (PreHTN 120-139/80-89, Stg1 140-159/90-99, Stg2 >160/>100)  [place] = 3 of three CHRIST categories are positive (high risk is 2-3 positive categories)     Turner Sleepiness Scale TOTAL =      EPWORTH SLEEPINESS SCALE 6/30/2023   Sitting and reading 1   Watching TV 1   Sitting, inactive in a public place (e.g. a theatre or a meeting) 1   As a passenger in a car for an hour without a break 2   Lying down to rest in the afternoon when circumstances permit 2   Sitting and talking to someone 0   Sitting quietly after a lunch without alcohol 1   In a car, while stopped for a few minutes in traffic 0   Total score 8      (validated sleepiness questionnaire with a higher score indicating greater sleepiness; range 0-24)  No flowsheet data found.    STOP-Bang Questionnaire (validated CHRIST screening questionnaire)  Negative unless checked off.  [x] Snoring    [x]  Tired/Fatigued/Sleepy  [x] Obstruction (apneas/choking)  [x] Pressure (HTN)  [x] BMI >35  [x] Age >50  [] Neck >40 cm  [] Gender male   STOP-Bang = 6 (low risk 0-2,high risk 3-8)    Neck circumference 15"   [?CHRIST risk if >43cm (17in) male or >41cm (15.5 in) female]         MMRC Dyspnea Scale (4 is worst)     [x] MMRC 0: " "Dyspneic on strenuous excercise (0 points)    [] MMRC 1: Dyspneic on walking a slight hill (0 points)    [] MMRC 2: Dyspneic on walking level ground; must stop occasionally due to breathlessness (1 point)    [] MMRC 3: Must stop for breathlessness after walking 100 yards or after a few minutes (2 points)    [] MMRC 4: Cannot leave house; breathless on dressing/undressing (3 points)                     No flowsheet data found.              Objective:     Vital Signs (Most Recent)  Vital Signs  Pulse: 76  Resp: 18  SpO2: (!) 94 %  BP: 124/72  Height and Weight  Height: 5' 1" (154.9 cm)  Weight: 84.1 kg (185 lb 6.5 oz)  BSA (Calculated - sq m): 1.9 sq meters  BMI (Calculated): 35.1  Weight in (lb) to have BMI = 25: 132]  Wt Readings from Last 2 Encounters:   06/30/23 84.1 kg (185 lb 6.5 oz)   06/27/23 82.5 kg (181 lb 12.3 oz)       Physical Exam  Vitals and nursing note reviewed.   Constitutional:       Appearance: She is normal weight.   HENT:      Head: Normocephalic and atraumatic.      Nose: Nose normal.   Eyes:      Pupils: Pupils are equal, round, and reactive to light.   Cardiovascular:      Rate and Rhythm: Normal rate and regular rhythm.      Pulses: Normal pulses.      Heart sounds: Normal heart sounds. No murmur heard.  Pulmonary:      Effort: Pulmonary effort is normal.      Breath sounds: Normal breath sounds.   Abdominal:      General: Bowel sounds are normal.      Palpations: Abdomen is soft.   Musculoskeletal:      Cervical back: Normal range of motion.   Skin:     General: Skin is warm.   Neurological:      General: No focal deficit present.      Mental Status: She is alert and oriented to person, place, and time.   Psychiatric:         Mood and Affect: Mood normal.        Laboratory  Lab Results   Component Value Date    WBC 8.36 06/27/2023    RBC 5.07 06/27/2023    HGB 14.4 06/27/2023    HCT 45.6 06/27/2023    MCV 90 06/27/2023    MCH 28.4 06/27/2023    MCHC 31.6 (L) 06/27/2023    RDW 13.3 06/27/2023 "     06/27/2023    MPV 9.5 06/27/2023    GRAN 4.4 06/27/2023    GRAN 52.7 06/27/2023    LYMPH 2.8 06/27/2023    LYMPH 33.4 06/27/2023    MONO 0.7 06/27/2023    MONO 8.9 06/27/2023    EOS 0.3 06/27/2023    BASO 0.09 06/27/2023    EOSINOPHIL 3.7 06/27/2023    BASOPHIL 1.1 06/27/2023       BMP  Lab Results   Component Value Date     06/27/2023    K 4.9 06/27/2023     06/27/2023    CO2 24 06/27/2023    BUN 10 06/27/2023    CREATININE 0.9 06/27/2023    CALCIUM 10.0 06/27/2023    ANIONGAP 13 06/27/2023    ESTGFRAFRICA >60.0 06/29/2022    EGFRNONAA >60.0 06/29/2022    AST 35 06/27/2023    ALT 25 06/27/2023    PROT 7.3 06/27/2023          No results found for: IGE     No results found for: ASPERGILLUS  No results found for: AFUMIGATUSCL     No results found for: ACE     Diagnostic Results:  I have personally reviewed today the following studies:         Assessment/Plan:     Problem List Items Addressed This Visit       Hypertriglyceridemia - Primary (Chronic)    Essential hypertension (Chronic)     Stable on COzaar         Prediabetes    Acquired hypothyroidism     On SYNTHROID         Obesity (BMI 30-39.9)     Patient would benefit from weight loss and has tried to set realistic goals to achieve success. Lifestyle changes were discussed on eating healthy, exercising at least 150 minutes weekly, and reducing sedentary behavior.   Discussed the risk factors associated with obesity: Arthritis/CHRIST/Diabetes/Fatty Liver/Cardiovascular disease/GERD/HTN/HLP.          CHRIST (obstructive sleep apnea)     Snoring, Apnea,   Comorbidity; HTN, BMI    Home sleep test ordered          Other Visit Diagnoses       Sleep apnea, unspecified type               My recommendation at this point would be to set up a HOME SLEEP TEST or INLAB POLYSOMNOGRAPHY  through [facility] ( [phone].    We have discussed weight loss , non supine position, good sleep hygiene, and  other interventions to foster good natural  healthy sleep.  We have discussed behavioral modifications, as well.  After her study, she  could certainly try PAP therapy or out suitable alternatives        Follow up in about 4 weeks (around 7/28/2023), or Home sleep study.    This note was prepared using voice recognition system and is likely to have sound alike errors that may have been overlooked even after proof reading.  Please call me with any questions    Discussed diagnosis, its evaluation, treatment and usual course. All questions answered.    Thank you for the courtesy of participating in the care of this patient    Baltazar Panda MD      Personal Diagnostic Review  []  CXR    []  ECHO    []  ONSAT    []  6MWD    []  LABS    []  CHEST CT    []  PET CT    []  Biopsy results

## 2023-06-30 NOTE — PATIENT INSTRUCTIONS
Your provider has scheduled you for a sleep study.   You should be receiving a phone call from the sleep lab shortly after your study has been approved by your insurance. Please make sure you have your current phone numbers [facility]. If you do not hear from anyone in the next 10 business days, please call the sleep lab at [phone] to schedule your sleep study. The sleep studies are performed at [facility] seven nights a week.  When you are scheduling your sleep study, they will also make you a follow up appointment with your provider. This follow up appointment will be 10-14 days after your sleep study to review the results. If it is noted that you do have sleep apnea on your initial sleep study, you may receive a call back for a second night study with the CPAP before you come back to the office.

## 2023-07-16 ENCOUNTER — PATIENT MESSAGE (OUTPATIENT)
Dept: FAMILY MEDICINE | Facility: CLINIC | Age: 63
End: 2023-07-16
Payer: COMMERCIAL

## 2023-07-16 DIAGNOSIS — M25.551 RIGHT HIP PAIN: ICD-10-CM

## 2023-07-16 DIAGNOSIS — M54.50 ACUTE RIGHT-SIDED LOW BACK PAIN WITHOUT SCIATICA: Primary | ICD-10-CM

## 2023-07-28 ENCOUNTER — HOSPITAL ENCOUNTER (OUTPATIENT)
Dept: RADIOLOGY | Facility: HOSPITAL | Age: 63
Discharge: HOME | End: 2023-07-28
Attending: FAMILY MEDICINE
Payer: COMMERCIAL

## 2023-07-28 VITALS — HEIGHT: 61 IN | WEIGHT: 185.44 LBS | BODY MASS INDEX: 35.01 KG/M2

## 2023-07-28 DIAGNOSIS — Z12.31 ENCOUNTER FOR SCREENING MAMMOGRAM FOR MALIGNANT NEOPLASM OF BREAST: ICD-10-CM

## 2023-07-28 PROCEDURE — 77067 SCR MAMMO BI INCL CAD: CPT | Mod: 26,,, | Performed by: RADIOLOGY

## 2023-07-28 PROCEDURE — 77067 MAMMO DIGITAL SCREENING BILAT WITH TOMO: ICD-10-PCS | Mod: 26,,, | Performed by: RADIOLOGY

## 2023-07-28 PROCEDURE — 77063 BREAST TOMOSYNTHESIS BI: CPT | Mod: 26,,, | Performed by: RADIOLOGY

## 2023-07-28 PROCEDURE — 77067 SCR MAMMO BI INCL CAD: CPT | Mod: TC

## 2023-07-28 PROCEDURE — 77063 MAMMO DIGITAL SCREENING BILAT WITH TOMO: ICD-10-PCS | Mod: 26,,, | Performed by: RADIOLOGY

## 2023-08-02 ENCOUNTER — PATIENT MESSAGE (OUTPATIENT)
Dept: ADMINISTRATIVE | Facility: OTHER | Age: 63
End: 2023-08-02
Payer: COMMERCIAL

## 2023-08-31 DIAGNOSIS — I10 ESSENTIAL HYPERTENSION: ICD-10-CM

## 2023-08-31 RX ORDER — LOSARTAN POTASSIUM 25 MG/1
TABLET ORAL
Qty: 90 TABLET | Refills: 3 | Status: SHIPPED | OUTPATIENT
Start: 2023-08-31

## 2023-08-31 RX ORDER — LEVOTHYROXINE SODIUM 50 UG/1
TABLET ORAL
Qty: 90 TABLET | Refills: 3 | Status: SHIPPED | OUTPATIENT
Start: 2023-08-31

## 2023-08-31 NOTE — TELEPHONE ENCOUNTER
No care due was identified.  [facility] Embedded Care Due Messages. Reference number: [id number].   8/31/2023 11:01:28 AM CDT

## 2023-08-31 NOTE — TELEPHONE ENCOUNTER
Refill Decision Note   Fatuma Akins  is requesting a refill authorization.  Brief Assessment and Rationale for Refill:  Approve     Medication Therapy Plan:         Comments:     Note composed:2:06 PM 08/31/2023

## 2023-09-12 ENCOUNTER — PATIENT MESSAGE (OUTPATIENT)
Dept: PULMONOLOGY | Facility: CLINIC | Age: 63
End: 2023-09-12
Payer: COMMERCIAL

## 2023-09-12 DIAGNOSIS — G47.33 OSA (OBSTRUCTIVE SLEEP APNEA): Primary | ICD-10-CM

## 2023-09-15 ENCOUNTER — CLINICAL SUPPORT (OUTPATIENT)
Dept: REHABILITATION | Facility: HOSPITAL | Age: 63
End: 2023-09-15
Payer: COMMERCIAL

## 2023-09-15 DIAGNOSIS — M54.50 ACUTE RIGHT-SIDED LOW BACK PAIN WITHOUT SCIATICA: ICD-10-CM

## 2023-09-15 DIAGNOSIS — S76.319A HAMSTRING MUSCLE STRAIN, UNSPECIFIED LATERALITY, INITIAL ENCOUNTER: ICD-10-CM

## 2023-09-15 DIAGNOSIS — R68.89 DECREASED STRENGTH, ENDURANCE, AND MOBILITY: ICD-10-CM

## 2023-09-15 DIAGNOSIS — R26.89 FUNCTIONAL GAIT ABNORMALITY: ICD-10-CM

## 2023-09-15 DIAGNOSIS — R53.1 DECREASED STRENGTH, ENDURANCE, AND MOBILITY: ICD-10-CM

## 2023-09-15 DIAGNOSIS — M62.89 ABNORMAL INCREASED MUSCLE TONE: ICD-10-CM

## 2023-09-15 DIAGNOSIS — Z74.09 DECREASED STRENGTH, ENDURANCE, AND MOBILITY: ICD-10-CM

## 2023-09-15 DIAGNOSIS — M25.551 RIGHT HIP PAIN: ICD-10-CM

## 2023-09-15 PROBLEM — M79.641 PAIN OF RIGHT HAND: Status: RESOLVED | Noted: 2020-08-14 | Resolved: 2023-09-15

## 2023-09-15 PROCEDURE — 97162 PT EVAL MOD COMPLEX 30 MIN: CPT

## 2023-09-15 PROCEDURE — 97110 THERAPEUTIC EXERCISES: CPT

## 2023-09-15 PROCEDURE — 97140 MANUAL THERAPY 1/> REGIONS: CPT

## 2023-09-15 PROCEDURE — 97112 NEUROMUSCULAR REEDUCATION: CPT

## 2023-09-15 NOTE — PLAN OF CARE
[facility]   Physical Therapy Initial Evaluation      Date: 9/15/2023   Name: Fatuma Akins  Clinic Number: [id number]    Therapy Diagnosis:    Encounter Diagnoses   Name Primary?    Acute right-sided low back pain without sciatica     Right hip pain     Functional gait abnormality     Decreased strength, endurance, and mobility     Abnormal increased muscle tone     Hamstring muscle strain, unspecified laterality, initial encounter       Physician: Mercy Fitzpatrick MD     Physician Orders: PT Eval and Treat  Medical Diagnosis from Referral: Acute right-sided low back pain without sciatica [M54.50], Right hip pain [M25.551]  Evaluation Date: 9/15/2023  Authorization Period Expiration: 7/16/2024  Plan of Care Expiration: 12/15/2023  Progress Note Due: 10/15/2023  Visit # / Visits authorized: 1/1   FOTO: 1/3 (last performed on 9/15/2023)    Precautions: Standard and hypertension    Time In: 1248  Time Out: 1340  Total Billable Time (timed & untimed codes): 52 minutes    Subjective     Date of onset: Over the summer- few months ago    History of current condition - Fatuma reports that she is feeling pretty good today but reports that a few months ago (first week of the summer) she noticed increased low back and hip pain after weed eating.  Patient reports that she has increased low back pain with walking. Patient reports that about 4 weeks ago she slid coming out of the shower with left leg going forward in which she feels she sustained a hamstring strain on the left side. Patient reports that following she could walk but only with stiff knee/leg posturing on right. Patient went to [place] for a trip a few weeks back and had a lot of trouble walking. Patient reports that she went to a movie and afterwards she has increased pain and stiffness when standing up initially. Patient reports that she gardened yesterday in a seated bent over position in which she had stiffness and pain  afterwards so she is     Physical Activity: Patient reports that she participates in Pump, Yoga, Step, [employer]ene[employer] at [employer] 5 days per week      Falls: None reported    Imaging: None present for current    Pain:  Current 0/10, worst 8/10, best 0/10   Location: [x] Right sided low back and hip pain  Description: aching   Aggravating Factors: Walking  Easing Factors: activity avoidance, rest, sitting    Prior Therapy:   [] N/A    [x] Yes: for her hand and shoulders  Social History: Pt lives alone  Occupation: Pt is retired.  Prior Level of Function: Independent and pain free with all ADL, IADL, community mobility and functional activities.   Current Level of Function: Independent with all ADL, IADL, community mobility and functional activities with reports of increased pain and need for increased time and frequent breaks.  Increased pain with more antalgic gait pattern present for longer durations of ambulation.     Dominant Extremity:    [x] Right Handed/Legged    Pts goals: Pt reported goals are to decrease overall pain levels in order to return to prior functional level. Would like to be able to walk long distances without pain.     Medical History:   Past Medical History:   Diagnosis Date    Atrophic vaginitis     Hypertension     Hypertriglyceridemia     Hypothyroidism     Obesity     Prediabetes        Surgical History:   Fatuma Akins  has a past surgical history that includes Excision basal cell carcinoma and Colonoscopy (N/A, 8/1/2019).    Medications:   Fatuma has a current medication list which includes the following prescription(s): ascorbate calcium, ascorbic acid/multivit-min, calcium-vitamin d3, cholecalciferol (vitamin d3), coq10 (ubiquinol), fish oil-omega-3 fatty acids, levothyroxine, losartan, magnesium citrate, multivitamin with minerals, and vit a/c/e ac/znox/cupric oxide.    Allergies:   Review of patient's allergies indicates:  No Known Allergies     Objective        RANGE OF MOTION:    Lumbar ROM Right  (spine) Left   Pain/Dysfunction with Movement Goal   Lumbar Flexion (60º) Finger tips to toes --- Tightness/pain in hamstrings No pain   Lumbar Extension (30º) 10 --- Pain in lumbar spine 25 No pain   Lumbar Side Bending (25º) Finger tips to ipsilateral knee Finger tips to ipsilateral knee Tightness in right hip No pain   Lumbar Rotation 50% 50%  75%     Hip Range of Motion in Normal although painful with flexion on right side and extension on left side     STRENGTH:   L/E MMT Right  (spine) Left Goal   Abdominal Strength in supine hook lying with ability to abdominal brace Poor --- Modified (90/90): Good    Hip Flexion  4+/5 4+/5 4+/5 B   Hip Extension  4/5 4-/5 4+/5 B   Hip Abduction  4/5 3+/5 4+/5 B   Knee Extension 4+/5 4+/5 5/5 B   Knee Flexion 4/5 3+/5 5/5 B   Hip IR 4/5 4/5 4+/5 B   Hip ER 4/5 4/5 4+/5 B     MUSCLE LENGTH:   Muscle Tested  Right Left  Limitation Goal   Hip Flexors [] Normal  [x] Limited [] Normal  [x] Limited  Normal B   ITB / TFL [] Normal  [x] Limited [x] Normal  [] Limited  Normal B   Quadriceps [x] Normal  [] Limited [x] Normal  [] Limited  Normal B   Hamstrings  [] Normal  [x] Limited [] Normal  [x] Limited With pain Normal B   Piriformis  [] Normal  [x] Limited [x] Normal  [] Limited  Normal B     JOINT MOBILITY:   Joint Motion  Right Mobility  (spine) Left Mobility Goal   Lumbar PA [] Hypo     [] Normal     [x] Hyper --- Normal     SENSATION  [x] Intact to Light Touch   [] Impaired:      PALPATION: Muscles: Increased tone and tenderness to palpation of: right glutes, piriformis, hamstrings, left hamstrings, adductors, hip flexors, bilateral lumbar paraspinals       POSTURE:  Pt presents with postural abnormalities which include:    [x] Forward Head   [x] Increased Lumbar Lordosis   [x] Rounded Shoulder   [] Genu Recurvatum   [] Increased Thoracic Kyphosis [] Genu Valgus   [] Trunk Deviated    [] Pes Planus   [] Scapular Winging    [] Other:     Function:     Intake  Outcome Measure for FOTO Hip Survey    Therapist reviewed FOTO scores for Fatuma on 9/15/2023.   FOTO report - see Media section or FOTO account for episode details    Intake Score: 73         Treatment     Total Treatment time (time-based codes) separate from Evaluation: (24) minutes     Fatuma received the treatments listed below:      MANUAL THERAPY TECHNIQUES were applied for (8) minutes, including:    Manual Intervention Performed Today    Soft Tissue Mobilization [x] right glutes, piriformis   Joint Mobilizations []     []     []    Functional Dry Needling  []      Plan for Next Visit: Continue as needed       THERAPEUTIC EXERCISES to develop strength, endurance, ROM, flexibility, posture, and core stabilization for (8) minutes including:    Intervention Performed Today    Educated patient on the impairments present and the plan of care to address these impairments x    Educated patient on the importance of slowing down all motions in workout classes to focus more on form and adequate muscle engagement when doing various exercises x                                    Plan for Next Visit:      NEUROMUSCULAR RE-EDUCATION ACTIVITIES to improve Balance, Coordination, Kinesthetic, Sense, Proprioception, and Posture for (8) minutes.  The following were included:    Intervention Performed Today    Educated patient on the importance of improving abdominal engagement and progressing towards incorporation of this with all functional tasks x    Educated patient on the proper form and sequencing of all exercises provided in HEP today x See Patient Instructions                                    Plan for Next Visit:      Patient Education and Home Exercises     Education provided: (included in billable time) minutes  PURPOSE: Patient educated on the impairments noted above and the effects of physical therapy intervention to improve overall condition and QOL.   EXERCISE: Patient was educated on all the above exercise  prior/during/after for proper posture, positioning, and execution for safe performance with home exercise program.   STRENGTH: Patient educated on the importance of improved core and extremity strength in order to improve alignment of the spine and extremities with static positions and dynamic movement.     Written Home Exercises Provided: yes.  Exercises were reviewed and Fatuma was able to demonstrate them prior to the end of the session.  Fatuma demonstrated good  understanding of the education provided. See EMR under Patient Instructions for exercises provided during therapy sessions.    Assessment     Fatuma is a 63 y.o. female referred to outpatient Physical Therapy with a medical diagnosis of Acute right-sided low back pain without sciatica [M54.50], Right hip pain [M25.551]. Pt presents with impairments in the following categories: IMPAIRMENTS: strength, endurance, balance, posture, core strength and stability, functional movement patterns, and coordination resulting in decreased functional independence as she has significant dysfunction when having to ambulate longer distances due to increased pain.     Pt prognosis is Good  Pt will benefit from skilled outpatient Physical Therapy to address the deficits stated above and in the chart below, provide pt/family education, and to maximize pt's level of independence.     Plan of care discussed with patient: Yes  Pt's spiritual, cultural and educational needs considered and patient is agreeable to the plan of care and goals as stated below:     Anticipated Barriers for therapy: chronicity of condition and adherence to treatment plan    Medical Necessity is demonstrated by the following  History  Co-morbidities and personal factors that may impact the plan of care [] LOW: no personal factors / co-morbidities  [x] MODERATE: 1-2 personal factors / co-morbidities  [] HIGH: 3+ personal factors / co-morbidities    Moderate / High Support Documentation:   Past Medical  "History:   Diagnosis Date    Atrophic vaginitis     Hypertension     Hypertriglyceridemia     Hypothyroidism     Obesity     Prediabetes         Examination  Body Structures and Functions, activity limitations and participation restrictions that may impact the plan of care [] LOW: addressing 1-2 elements  [x] MODERATE: 3+ elements  [] HIGH: 4+ elements (please support below)    Moderate / High Support Documentation: See above in "Current Level of Function"      Clinical Presentation [] LOW: stable  [x] MODERATE: Evolving  [] HIGH: Unstable     Decision Making/ Complexity Score: moderate         Short Term Goals:  6 weeks Status  Date Met   PAIN: Pt will report worst pain of 4/10 in order to progress toward max functional ability and improve quality of life. [x] Progressing  [] Met  [] Not Met    MOBILITY: Patient will improve AROM to stated goals, listed in objective measures above, in order to progress towards independence with functional activities.  [x] Progressing  [] Met  [] Not Met    STRENGTH: Patient will improve strength to 50% of stated goals, listed in objective measures above, in order to progress towards independence with functional activities. [x] Progressing  [] Met  [] Not Met    POSTURE: Patient will correct postural deviations in sitting and standing, to decrease pain and promote long term stability.  [x] Progressing  [] Met  [] Not Met    HEP: Patient will demonstrate independence with HEP in order to progress toward functional independence. [x] Progressing  [] Met  [] Not Met      Long Term Goals:  12 weeks Status Date Met   PAIN: Pt will report worst pain of 1/10 in order to progress toward max functional ability and improve quality of life [x] Progressing  [] Met  [] Not Met    FUNCTION: Patient will demonstrate improved function as indicated by a score of greater than or equal to 78 out of 100 on FOTO. [x] Progressing  [] Met  [] Not Met    STRENGTH: Patient will improve strength to stated " goals, listed in objective measures above, in order to improve functional independence and quality of life. [x] Progressing  [] Met  [] Not Met    GAIT: Patient will demonstrate normalized gait mechanics with minimal compensation in order to return to PLOF. [x] Progressing  [] Met  [] Not Met    Patient will return to normal ADL's, IADL's, community involvement, recreational activities, and work-related activities with less than or equal to 1/10 pain and maximal function.  [x] Progressing  [] Met  [] Not Met      Plan     Plan of care Certification: 9/15/2023 to 12/5/2023.    Outpatient Physical Therapy 2 times weekly for 12 weeks to include any combination of the following interventions: virtual visits, dry needling, modalities, electrical stimulation (IFC, Pre-Mod, Attended with Functional Dry Needling), Aquatic Therapy, Cervical/Lumbar Traction, Electrical Stimulation unattended/attended, Gait Training, Manual Therapy, Neuromuscular Re-ed, Patient Education, Self Care, Therapeutic Exercise, and Therapeutic Activites     Zamzam Ford, PT, DPT

## 2023-09-19 ENCOUNTER — CLINICAL SUPPORT (OUTPATIENT)
Dept: REHABILITATION | Facility: HOSPITAL | Age: 63
End: 2023-09-19
Payer: COMMERCIAL

## 2023-09-19 DIAGNOSIS — R68.89 DECREASED STRENGTH, ENDURANCE, AND MOBILITY: ICD-10-CM

## 2023-09-19 DIAGNOSIS — Z74.09 DECREASED STRENGTH, ENDURANCE, AND MOBILITY: ICD-10-CM

## 2023-09-19 DIAGNOSIS — S76.319A HAMSTRING MUSCLE STRAIN, UNSPECIFIED LATERALITY, INITIAL ENCOUNTER: ICD-10-CM

## 2023-09-19 DIAGNOSIS — M62.89 ABNORMAL INCREASED MUSCLE TONE: ICD-10-CM

## 2023-09-19 DIAGNOSIS — R53.1 DECREASED STRENGTH, ENDURANCE, AND MOBILITY: ICD-10-CM

## 2023-09-19 DIAGNOSIS — R26.89 FUNCTIONAL GAIT ABNORMALITY: Primary | ICD-10-CM

## 2023-09-19 PROCEDURE — 97112 NEUROMUSCULAR REEDUCATION: CPT

## 2023-09-19 PROCEDURE — 97110 THERAPEUTIC EXERCISES: CPT

## 2023-09-19 NOTE — PROGRESS NOTES
[facility]   Physical Therapy Treatment Note      Name: Fatuma Akins  Clinic Number: [id number]    Therapy Diagnosis:   Encounter Diagnoses   Name Primary?    Functional gait abnormality Yes    Decreased strength, endurance, and mobility     Abnormal increased muscle tone     Hamstring muscle strain, unspecified laterality, initial encounter      Physician: Mercy Fitzpatrick MD    Visit Date: 9/19/2023    Physician Orders: PT Eval and Treat  Medical Diagnosis from Referral: Acute right-sided low back pain without sciatica [M54.50], Right hip pain [M25.551]  Evaluation Date: 9/15/2023  Authorization Period Expiration: 7/16/2024  Plan of Care Expiration: 12/15/2023  Progress Note Due: 10/15/2023  Visit # / Visits authorized: 2/1   FOTO: 1/3 (last performed on 9/15/2023)     Precautions: Standard and hypertension     Time In: 1430  Time Out: 1520  Total Billable Time (timed & untimed codes): 48 minutes    PTA Visit #: 0/5     Subjective     Patient reports: that she worked in the yard this morning, leaning over weeding. Patient reports that her hip is feeling stiff today.     She was compliant with home exercise program.  Response to previous treatment: No adverse reactions  Functional change: No noted functional change    Pain: 3/10  Location: Right sided low back and hip pain    Objective      Objective Measures updated at progress report unless specified.     Treatment     Fatuma received the treatments listed below:      MANUAL THERAPY TECHNIQUES were applied for (0) minutes, including:     Manual Intervention Performed Today     Soft Tissue Mobilization [x]  right glutes, piriformis   Joint Mobilizations []        []        []      Functional Dry Needling  []         Plan for Next Visit: Continue as needed       THERAPEUTIC EXERCISES to develop strength, endurance, ROM, flexibility, posture, and core stabilization for (8) minutes including:     Intervention Performed Today      Piriformis Stretch x 2x30 second holds B (minimal pain in left hip today)   Recumbent Bike x 6 minutes level 2                                                       Plan for Next Visit:       NEUROMUSCULAR RE-EDUCATION ACTIVITIES to improve Balance, Coordination, Kinesthetic, Sense, Proprioception, and Posture for (40) minutes.  The following were included:     Intervention Performed Today     Abdominal Bracing X  x 3 second holds x25 reps   + Bent Knee Fall Outs 2x10 reps B green theraband    Side Lying Clams x 3 second holds 2x10 reps green theraband    Bridges x 3x10 reps green theraband around knees   Prone Hip Extension x 2x10 reps B                                      Plan for Next Visit:       Patient Education and Home Exercises       Education provided:   PURPOSE: Patient educated on the impairments noted above and the effects of physical therapy intervention to improve overall condition and QOL.   EXERCISE: Patient was educated on all the above exercise prior/during/after for proper posture, positioning, and execution for safe performance with home exercise program.   STRENGTH: Patient educated on the importance of improved core and extremity strength in order to improve alignment of the spine and extremities with static positions and dynamic movement.     Written Home Exercises Provided: Patient instructed to cont prior HEP. Exercises were reviewed and Fatuma was able to demonstrate them prior to the end of the session.  Fatuma demonstrated good  understanding of the education provided. See Electronic Medical Record under Patient Instructions for exercises provided during therapy sessions    Assessment     Patient tolerated treatment well today with improvement of pain and muscle tension by the end of the session. Focused on obtaining adequate abdominal engagement and progressing core program as tolerated. Will continue to progress each session if able.     Fatuma Is progressing well towards her goals.    Patient prognosis is Good.     Patient will continue to benefit from skilled outpatient physical therapy to address the deficits listed in the problem list box on initial evaluation, provide pt/family education and to maximize pt's level of independence in the home and community environment.     Patient's spiritual, cultural and educational needs considered and pt agreeable to plan of care and goals.     Anticipated barriers to physical therapy: chronicity of condition and adherence to treatment plan    Goals:   Short Term Goals:  6 weeks Status  Date Met   PAIN: Pt will report worst pain of 4/10 in order to progress toward max functional ability and improve quality of life. [x] Progressing  [] Met  [] Not Met     MOBILITY: Patient will improve AROM to stated goals, listed in objective measures above, in order to progress towards independence with functional activities.  [x] Progressing  [] Met  [] Not Met     STRENGTH: Patient will improve strength to 50% of stated goals, listed in objective measures above, in order to progress towards independence with functional activities. [x] Progressing  [] Met  [] Not Met     POSTURE: Patient will correct postural deviations in sitting and standing, to decrease pain and promote long term stability.  [x] Progressing  [] Met  [] Not Met     HEP: Patient will demonstrate independence with HEP in order to progress toward functional independence. [x] Progressing  [] Met  [] Not Met        Long Term Goals:  12 weeks Status Date Met   PAIN: Pt will report worst pain of 1/10 in order to progress toward max functional ability and improve quality of life [x] Progressing  [] Met  [] Not Met     FUNCTION: Patient will demonstrate improved function as indicated by a score of greater than or equal to 78 out of 100 on FOTO. [x] Progressing  [] Met  [] Not Met     STRENGTH: Patient will improve strength to stated goals, listed in objective measures above, in order to improve functional  independence and quality of life. [x] Progressing  [] Met  [] Not Met     GAIT: Patient will demonstrate normalized gait mechanics with minimal compensation in order to return to PLOF. [x] Progressing  [] Met  [] Not Met     Patient will return to normal ADL's, IADL's, community involvement, recreational activities, and work-related activities with less than or equal to 1/10 pain and maximal function.  [x] Progressing  [] Met  [] Not Met          Plan     Continue to progress core/hip program to tolerance.     Zamzam Ford, PT, DPT

## 2023-09-23 ENCOUNTER — TELEPHONE (OUTPATIENT)
Dept: FAMILY MEDICINE | Facility: CLINIC | Age: 63
End: 2023-09-23
Payer: COMMERCIAL

## 2023-09-23 DIAGNOSIS — Z23 NEED FOR VACCINATION: Primary | ICD-10-CM

## 2023-09-26 ENCOUNTER — CLINICAL SUPPORT (OUTPATIENT)
Dept: FAMILY MEDICINE | Facility: CLINIC | Age: 63
End: 2023-09-26
Payer: COMMERCIAL

## 2023-09-26 DIAGNOSIS — Z23 NEED FOR VACCINATION: Primary | ICD-10-CM

## 2023-09-26 PROCEDURE — 90471 IMMUNIZATION ADMIN: CPT | Mod: S$GLB,,, | Performed by: FAMILY MEDICINE

## 2023-09-26 PROCEDURE — 90471 ZOSTER RECOMBINANT VACCINE: ICD-10-PCS | Mod: S$GLB,,, | Performed by: FAMILY MEDICINE

## 2023-09-26 PROCEDURE — 99999 PR PBB SHADOW E&M-EST. PATIENT-LVL II: CPT | Mod: PBBFAC,,,

## 2023-09-26 PROCEDURE — 99999 PR PBB SHADOW E&M-EST. PATIENT-LVL II: ICD-10-PCS | Mod: PBBFAC,,,

## 2023-09-26 PROCEDURE — 90750 ZOSTER RECOMBINANT VACCINE: ICD-10-PCS | Mod: S$GLB,,, | Performed by: FAMILY MEDICINE

## 2023-09-26 PROCEDURE — 90750 HZV VACC RECOMBINANT IM: CPT | Mod: S$GLB,,, | Performed by: FAMILY MEDICINE

## 2023-09-26 NOTE — PROGRESS NOTES
Patient received her shingle shot in her left deltoid area. Patient tolerated her injection well.  Patient advised to wait 15 minutes patient verbalized understanding.

## 2023-09-28 ENCOUNTER — CLINICAL SUPPORT (OUTPATIENT)
Dept: REHABILITATION | Facility: HOSPITAL | Age: 63
End: 2023-09-28
Payer: COMMERCIAL

## 2023-09-28 DIAGNOSIS — R26.89 FUNCTIONAL GAIT ABNORMALITY: Primary | ICD-10-CM

## 2023-09-28 DIAGNOSIS — R53.1 DECREASED STRENGTH, ENDURANCE, AND MOBILITY: ICD-10-CM

## 2023-09-28 DIAGNOSIS — R68.89 DECREASED STRENGTH, ENDURANCE, AND MOBILITY: ICD-10-CM

## 2023-09-28 DIAGNOSIS — M62.89 ABNORMAL INCREASED MUSCLE TONE: ICD-10-CM

## 2023-09-28 DIAGNOSIS — Z74.09 DECREASED STRENGTH, ENDURANCE, AND MOBILITY: ICD-10-CM

## 2023-09-28 DIAGNOSIS — S76.319A HAMSTRING MUSCLE STRAIN, UNSPECIFIED LATERALITY, INITIAL ENCOUNTER: ICD-10-CM

## 2023-09-28 PROCEDURE — 97112 NEUROMUSCULAR REEDUCATION: CPT

## 2023-09-28 PROCEDURE — 97110 THERAPEUTIC EXERCISES: CPT

## 2023-09-28 NOTE — PROGRESS NOTES
[facility]   Physical Therapy Treatment Note      Name: Fatuma Akins  Clinic Number: [id number]    Therapy Diagnosis:   Encounter Diagnoses   Name Primary?    Functional gait abnormality Yes    Decreased strength, endurance, and mobility     Abnormal increased muscle tone     Hamstring muscle strain, unspecified laterality, initial encounter      Physician: Mercy Fitzpatrick MD    Visit Date: 9/28/2023    Physician Orders: PT Eval and Treat  Medical Diagnosis from Referral: Acute right-sided low back pain without sciatica [M54.50], Right hip pain [M25.551]  Evaluation Date: 9/15/2023  Authorization Period Expiration: 7/16/2024  Plan of Care Expiration: 12/15/2023  Progress Note Due: 10/15/2023  Visit # / Visits authorized: 2/20 (+1 for evaluation)   FOTO: 1/3 (last performed on 9/15/2023)     Precautions: Standard and hypertension     Time In: 1115  Time Out: 1205  Total Billable Time (timed & untimed codes): 50 minutes    PTA Visit #: 0/5     Subjective     Patient reports: that she is not having any pain today but yesterday when she went to the store she had right deep posterior hip pain.     She  was somewhat  compliant with home exercise program.  Response to previous treatment: felt better following  Functional change: No noted functional change    Pain: 0/10  Location: Right sided low back and hip pain    Objective      Objective Measures updated at progress report unless specified.     Treatment     Fatuma received the treatments listed below:      MANUAL THERAPY TECHNIQUES were applied for (0) minutes, including:     Manual Intervention Performed Today     Soft Tissue Mobilization [x]  right glutes, piriformis   Joint Mobilizations []        []        []      Functional Dry Needling  []         Plan for Next Visit: Continue as needed       THERAPEUTIC EXERCISES to develop strength, endurance, ROM, flexibility, posture, and core stabilization for (12) minutes including:      Intervention Performed Today     Piriformis Stretch x 4x30 second holds B (minimal pain in left hip today)   Recumbent Bike x 5 minutes level 2                                                       Plan for Next Visit:       NEUROMUSCULAR RE-EDUCATION ACTIVITIES to improve Balance, Coordination, Kinesthetic, Sense, Proprioception, and Posture for (38) minutes.  The following were included:     Intervention Performed Today     Abdominal Bracing (progressed/added) X  X  X  X  x 3 second holds x25 reps   + Bent Knee Fall Outs 2x10 reps B   + LE Marches 2x10 reps B isolated   + Hip Adduction 3x10 reps   + Arm Flexion 2x10 reps    Side Lying Clams x 3 second holds to fatigue green theraband    Bridges  3x10 reps green theraband around knees   Prone Hip Extension  2x10 reps B   Side Lying Hip Series x X10 reps each direction B (deferred bicycles)                              Plan for Next Visit:       Patient Education and Home Exercises       Education provided:   PURPOSE: Patient educated on the impairments noted above and the effects of physical therapy intervention to improve overall condition and QOL.   EXERCISE: Patient was educated on all the above exercise prior/during/after for proper posture, positioning, and execution for safe performance with home exercise program.   STRENGTH: Patient educated on the importance of improved core and extremity strength in order to improve alignment of the spine and extremities with static positions and dynamic movement.     Written Home Exercises Provided: Patient instructed to cont prior HEP. Exercises were reviewed and Fatuma was able to demonstrate them prior to the end of the session.  Fatuma demonstrated good  understanding of the education provided. See Electronic Medical Record under Patient Instructions for exercises provided during therapy sessions    Assessment     Patient tolerated treatment well today. Focused heavily on core and hip strengthening as she has not been  working on this at home. Emphasized importance of better compliance with HEP exercises due to the frequency of which our therapy visits are which she reported good understanding of.     Fatuma Is progressing well towards her goals.   Patient prognosis is Good.     Patient will continue to benefit from skilled outpatient physical therapy to address the deficits listed in the problem list box on initial evaluation, provide pt/family education and to maximize pt's level of independence in the home and community environment.     Patient's spiritual, cultural and educational needs considered and pt agreeable to plan of care and goals.     Anticipated barriers to physical therapy: chronicity of condition and adherence to treatment plan    Goals:   Short Term Goals:  6 weeks Status  Date Met   PAIN: Pt will report worst pain of 4/10 in order to progress toward max functional ability and improve quality of life. [x] Progressing  [] Met  [] Not Met     MOBILITY: Patient will improve AROM to stated goals, listed in objective measures above, in order to progress towards independence with functional activities.  [x] Progressing  [] Met  [] Not Met     STRENGTH: Patient will improve strength to 50% of stated goals, listed in objective measures above, in order to progress towards independence with functional activities. [x] Progressing  [] Met  [] Not Met     POSTURE: Patient will correct postural deviations in sitting and standing, to decrease pain and promote long term stability.  [x] Progressing  [] Met  [] Not Met     HEP: Patient will demonstrate independence with HEP in order to progress toward functional independence. [x] Progressing  [] Met  [] Not Met        Long Term Goals:  12 weeks Status Date Met   PAIN: Pt will report worst pain of 1/10 in order to progress toward max functional ability and improve quality of life [x] Progressing  [] Met  [] Not Met     FUNCTION: Patient will demonstrate improved function as  indicated by a score of greater than or equal to 78 out of 100 on FOTO. [x] Progressing  [] Met  [] Not Met     STRENGTH: Patient will improve strength to stated goals, listed in objective measures above, in order to improve functional independence and quality of life. [x] Progressing  [] Met  [] Not Met     GAIT: Patient will demonstrate normalized gait mechanics with minimal compensation in order to return to PLOF. [x] Progressing  [] Met  [] Not Met     Patient will return to normal ADL's, IADL's, community involvement, recreational activities, and work-related activities with less than or equal to 1/10 pain and maximal function.  [x] Progressing  [] Met  [] Not Met          Plan     Continue to progress core/hip program to tolerance.     Zamzam Ford, PT, DPT

## 2023-10-03 ENCOUNTER — HOSPITAL ENCOUNTER (OUTPATIENT)
Dept: SLEEP MEDICINE | Facility: HOSPITAL | Age: 63
Discharge: HOME | End: 2023-10-03
Attending: INTERNAL MEDICINE
Payer: COMMERCIAL

## 2023-10-03 DIAGNOSIS — G47.33 OSA (OBSTRUCTIVE SLEEP APNEA): ICD-10-CM

## 2023-10-03 PROCEDURE — 95800 SLP STDY UNATTENDED: CPT | Mod: 26,,, | Performed by: INTERNAL MEDICINE

## 2023-10-03 PROCEDURE — 95806 SLEEP STUDY UNATT&RESP EFFT: CPT | Performed by: INTERNAL MEDICINE

## 2023-10-03 PROCEDURE — 95800 PR SLEEP STUDY, UNATTENDED, RECORD HEART RATE/O2 SAT/RESP ANAL/SLEEP TIME: ICD-10-PCS | Mod: 26,,, | Performed by: INTERNAL MEDICINE

## 2023-10-03 NOTE — Clinical Note
PHYSICIAN INTERPRETATION AND COMMENTS: Findings are consistent with severe, positional obstructive sleep apnea(CHRIST). Therapy with CPAP indicated CLINICAL HISTORY: 63 year old female presented with: 14 inch neck, BMI of 35.2, an [place] sleepiness score of 9, history of hypertension and symptoms of nocturnal snoring and witnessed apneas. Based on the clinical history, the patient has a high pre-test probability of having Moderate CHRIST.

## 2023-10-03 NOTE — PROCEDURES
PHYSICIAN INTERPRETATION AND COMMENTS: Findings are consistent with severe, positional obstructive sleep  apnea(CHRIST). Therapy with CPAP indicated  CLINICAL HISTORY: 63 year old female presented with: 14 inch neck, BMI of 35.2, an [place] sleepiness score of 9, history  of hypertension and symptoms of nocturnal snoring and witnessed apneas. Based on the clinical history, the patient has a  high pre-test probability of having Moderate CHRIST.  SLEEP STUDY FINDINGS: Patient underwent a 1 night Home Sleep Test and by behavioral criteria, slept for approximately  6.68 hours, with a sleep latency of 6 minutes and a sleep efficiency of 97%. Moderate sleep disordered breathing (AHI=23)  is noted based on a 4% hypopnea desaturation criteria, predominantly in the supine position (26 events/hour). The patient  slept supine 86.7% of the night based on valid recording time of 5.85 hours and is 26 times as likely to have  apneas/hypopneas when supine. When considering more subtle measures of sleep disordered breathing, the overall  respiratory disturbance index is severe(RDI=54) based on a 1% hypopnea desaturation criteria with confirmation by  surrogate arousal indicators. The apneas/hypopneas are accompanied by minimal oxygen desaturation (percent time  below 90% SpO2: 4%, Min SpO2: 76.2%). The average desaturation across all sleep disordered breathing events is 4%.  Snoring occurs for 15.5% (30 dB) of the study, 7.1% is very loud. The mean pulse rate is 61.1 BPM, with very frequent pulse  rate variability (84 events with >= 6 BPM increase/decrease per hour).  TREATMENT CONSIDERATIONS: Consider nasal continuous positive airway pressure (CPAP/AutoPAP) as the initial  treatment choice for Severe obstructive sleep apnea. A mandibular advancement splint (MAS) or referral to an ENT  surgeon for modification to the airway should be considered to reduce the risk of mortality caused by Severe CHRIST if the  patient prefers an alternative  "therapy or the CPAP trial is unsuccessful. Based on the CHRIST Supine data in the study, a  Mandibular Advancement Splint (MAS) will likely provide treatment benefit independent of CHRIST severity. The patient  should avoid sleeping supine; the non-supine AHI is 26 times less severe than the supine AHI    Dear Baltazar Panda MD  [id number] [address] [facility]  [place] [id number]/Mercy Fitzpatrick MD         The sleep study that you ordered is complete.  You have ordered sleep LAB services to perform the sleep study for Fatuma Akins .      Please find Sleep Study result in  the "Media tab" of Chart Review menu.        You can look  for the report in the  Media by the document type "Sleep Study Documents". Alphabetizing  "Document type" column helps to find the SLEEP STUDY report  Faster.       As the ordering provider, you are responsible for reviewing the results and implementing a treatment plan with your patient.    If you need a Sleep Medicine provider to explain the sleep study findings and arrange treatment for the patient, please refer patient for consultation to our Sleep Clinic via [facility] with Ambulatory Consult Sleep.     To do that please place an order for an  "Ambulatory Consult Sleep" -  order , it will go to our clinic work queue for our staff  to contact the patient for an appointment.      For any questions, please contact our sleep lab  staff at [phone] to talk to clinical staff          Baltazar Panda MD   "

## 2023-10-05 ENCOUNTER — PATIENT MESSAGE (OUTPATIENT)
Dept: SLEEP MEDICINE | Facility: CLINIC | Age: 63
End: 2023-10-05
Payer: COMMERCIAL

## 2023-10-06 ENCOUNTER — OFFICE VISIT (OUTPATIENT)
Dept: PULMONOLOGY | Facility: CLINIC | Age: 63
End: 2023-10-06
Payer: COMMERCIAL

## 2023-10-06 ENCOUNTER — CLINICAL SUPPORT (OUTPATIENT)
Dept: REHABILITATION | Facility: HOSPITAL | Age: 63
End: 2023-10-06
Payer: COMMERCIAL

## 2023-10-06 VITALS
RESPIRATION RATE: 17 BRPM | OXYGEN SATURATION: 98 % | HEIGHT: 61 IN | HEART RATE: 78 BPM | SYSTOLIC BLOOD PRESSURE: 124 MMHG | BODY MASS INDEX: 35.18 KG/M2 | WEIGHT: 186.31 LBS | DIASTOLIC BLOOD PRESSURE: 76 MMHG

## 2023-10-06 DIAGNOSIS — E03.9 ACQUIRED HYPOTHYROIDISM: ICD-10-CM

## 2023-10-06 DIAGNOSIS — R53.1 DECREASED STRENGTH, ENDURANCE, AND MOBILITY: ICD-10-CM

## 2023-10-06 DIAGNOSIS — R68.89 DECREASED STRENGTH, ENDURANCE, AND MOBILITY: ICD-10-CM

## 2023-10-06 DIAGNOSIS — Z74.09 DECREASED STRENGTH, ENDURANCE, AND MOBILITY: ICD-10-CM

## 2023-10-06 DIAGNOSIS — E66.9 OBESITY (BMI 30-39.9): ICD-10-CM

## 2023-10-06 DIAGNOSIS — R73.03 PREDIABETES: ICD-10-CM

## 2023-10-06 DIAGNOSIS — M62.89 ABNORMAL INCREASED MUSCLE TONE: ICD-10-CM

## 2023-10-06 DIAGNOSIS — E78.1 HYPERTRIGLYCERIDEMIA: Chronic | ICD-10-CM

## 2023-10-06 DIAGNOSIS — S76.319A HAMSTRING MUSCLE STRAIN, UNSPECIFIED LATERALITY, INITIAL ENCOUNTER: ICD-10-CM

## 2023-10-06 DIAGNOSIS — R26.89 FUNCTIONAL GAIT ABNORMALITY: Primary | ICD-10-CM

## 2023-10-06 DIAGNOSIS — G47.33 OSA (OBSTRUCTIVE SLEEP APNEA): Primary | ICD-10-CM

## 2023-10-06 DIAGNOSIS — I10 ESSENTIAL HYPERTENSION: Chronic | ICD-10-CM

## 2023-10-06 PROCEDURE — 97530 THERAPEUTIC ACTIVITIES: CPT

## 2023-10-06 PROCEDURE — 99999 PR PBB SHADOW E&M-EST. PATIENT-LVL IV: CPT | Mod: PBBFAC,,, | Performed by: INTERNAL MEDICINE

## 2023-10-06 PROCEDURE — 99214 OFFICE O/P EST MOD 30 MIN: CPT | Mod: S$GLB,,, | Performed by: INTERNAL MEDICINE

## 2023-10-06 PROCEDURE — 99214 PR OFFICE/OUTPT VISIT, EST, LEVL IV, 30-39 MIN: ICD-10-PCS | Mod: S$GLB,,, | Performed by: INTERNAL MEDICINE

## 2023-10-06 PROCEDURE — 1159F MED LIST DOCD IN RCRD: CPT | Mod: CPTII,S$GLB,, | Performed by: INTERNAL MEDICINE

## 2023-10-06 PROCEDURE — 1159F PR MEDICATION LIST DOCUMENTED IN MEDICAL RECORD: ICD-10-PCS | Mod: CPTII,S$GLB,, | Performed by: INTERNAL MEDICINE

## 2023-10-06 PROCEDURE — 97110 THERAPEUTIC EXERCISES: CPT

## 2023-10-06 PROCEDURE — 3008F PR BODY MASS INDEX (BMI) DOCUMENTED: ICD-10-PCS | Mod: CPTII,S$GLB,, | Performed by: INTERNAL MEDICINE

## 2023-10-06 PROCEDURE — 4010F ACE/ARB THERAPY RXD/TAKEN: CPT | Mod: CPTII,S$GLB,, | Performed by: INTERNAL MEDICINE

## 2023-10-06 PROCEDURE — 3074F SYST BP LT 130 MM HG: CPT | Mod: CPTII,S$GLB,, | Performed by: INTERNAL MEDICINE

## 2023-10-06 PROCEDURE — 3044F PR MOST RECENT HEMOGLOBIN A1C LEVEL <7.0%: ICD-10-PCS | Mod: CPTII,S$GLB,, | Performed by: INTERNAL MEDICINE

## 2023-10-06 PROCEDURE — 4010F PR ACE/ARB THEARPY RXD/TAKEN: ICD-10-PCS | Mod: CPTII,S$GLB,, | Performed by: INTERNAL MEDICINE

## 2023-10-06 PROCEDURE — 97112 NEUROMUSCULAR REEDUCATION: CPT

## 2023-10-06 PROCEDURE — 3044F HG A1C LEVEL LT 7.0%: CPT | Mod: CPTII,S$GLB,, | Performed by: INTERNAL MEDICINE

## 2023-10-06 PROCEDURE — 3078F DIAST BP <80 MM HG: CPT | Mod: CPTII,S$GLB,, | Performed by: INTERNAL MEDICINE

## 2023-10-06 PROCEDURE — 3074F PR MOST RECENT SYSTOLIC BLOOD PRESSURE < 130 MM HG: ICD-10-PCS | Mod: CPTII,S$GLB,, | Performed by: INTERNAL MEDICINE

## 2023-10-06 PROCEDURE — 99999 PR PBB SHADOW E&M-EST. PATIENT-LVL IV: ICD-10-PCS | Mod: PBBFAC,,, | Performed by: INTERNAL MEDICINE

## 2023-10-06 PROCEDURE — 3008F BODY MASS INDEX DOCD: CPT | Mod: CPTII,S$GLB,, | Performed by: INTERNAL MEDICINE

## 2023-10-06 PROCEDURE — 3078F PR MOST RECENT DIASTOLIC BLOOD PRESSURE < 80 MM HG: ICD-10-PCS | Mod: CPTII,S$GLB,, | Performed by: INTERNAL MEDICINE

## 2023-10-06 RX ORDER — NICOTINE POLACRILEX 2 MG
GUM BUCCAL
COMMUNITY
Start: 2023-09-01

## 2023-10-06 NOTE — PATIENT INSTRUCTIONS
We are starting you on PAP therapy in order to treat your obstructive sleep apnea. In my experience, the best ways to use this therapy is to minimize the irritation and maximize the daytime benefit. Please do the following:    -Use the PAP machine whenever you are sleeping at night, or during naps    -If you have some trouble with the machine, please call your home care company the next day: Your homecare company is either [facility]: refer to sticker on device    -If they are not able to solve your problem, please call me, and I will see what I can do. I can be reached at [phone], through my nurse, Meena    -If you share the bed with someone, ask them to be on the lookout for you snoring or stopping breathing, with the machine on. If you do, please let me know before our next appointment. If you had these complaints, prior to use of PAP, you will still have them on nights where you are not using the machine    -Finally, in order to verify that you are getting a adequate amount of time on the PAP machine I have requested that your home care company download information from it. This is typically done by removing a card from your machine and taking it by the homecare company. They should contact you several weeks before our next appointment to get this information. It is very important that we have this information before our next visit, in fact it is required by medicare or your insurance for your CPAP to be paid for, and unfortunately we do not have a  here in the office so you will need to get the card to them at least several days before our appointment.

## 2023-10-06 NOTE — PROGRESS NOTES
Pulmonary Outpatient  Visit     Subjective:       Patient ID: Fatuma Akins is a 63 y.o. female.    Social History     Tobacco Use   Smoking Status Never   Smokeless Tobacco Never            Chief Complaint: Sleep Apnea      Fatuma Akins is 63 y.o.  Referred by Mercy Fitzpatrick MD  Concern about CHRIST  Snoring, gasping at night, apnea  Observed by family  Comorbidity: HTN, BMI, HLD,   Retired  Duration 2 years  Bed time Midnight  Wake time 7 am: sleep latency 15 minutes  Denies RLS or sleep attacks  No signs of narcolepsy  Caffinated beverages after 7 pm  Naps for 60 minutes  +ve for snoring,nocturnal sweating, gasp for air, wake up at night, sleep too little    10/06/2023  Followup  HSAT was +ve for severe CHRIST  AHI 23.0/hr, RDI 54.0/hr  Goals of CPAP discussed               Review of Systems   Constitutional:  Positive for fatigue.   Respiratory:  Positive for apnea, snoring and somnolence.    Psychiatric/Behavioral:  Positive for sleep disturbance.    All other systems reviewed and are negative.      Outpatient Encounter Medications as of 10/6/2023   Medication Sig Dispense Refill    biotin 1 mg Cap       vitamin B complex (B COMPLEX 1 ORAL)       ascorbic acid/multivit-min (EMERGEN-C IMMUNE PLUS ORAL)       calcium-vitamin D3 500 mg(1,250mg) -200 unit per tablet Take 1 tablet by mouth 2 (two) times daily with meals.      cholecalciferol, vitamin D3, 125 mcg (5,000 unit) Tab       coQ10, ubiquinol, 200 mg Cap Take by mouth.      fish oil-omega-3 fatty acids 300-1,000 mg capsule Take 2 g by mouth once daily.      levothyroxine (SYNTHROID) 50 MCG tablet TAKE ONE TABLET (50MG) BY MOUTH DAILY 90 tablet 3    losartan (COZAAR) 25 MG tablet TAKE ONE TABLET BY MOUTH EVERY DAY 90 tablet 3    MAGNESIUM CITRATE ORAL       multivitamin with minerals tablet Take 1 tablet by mouth once daily.      vit A/C/E ac/ZnOx/cupric oxide (EYE VITAMIN AND MINERALS ORAL) Take by mouth.       [DISCONTINUED] ascorbate calcium (PABLITO-C ORAL)        No facility-administered encounter medications on file as of 10/6/2023.       The following portions of the patient's history were reviewed and updated as appropriate: She  has a past medical history of Atrophic vaginitis, Hypertension, Hypertriglyceridemia, Hypothyroidism, Obesity, and Prediabetes.  She does not have any pertinent problems on file.  She  has a past surgical history that includes Excision basal cell carcinoma and Colonoscopy (N/A, 8/1/2019).  Her family history includes Blindness in her mother; Breast cancer in her maternal aunt; Cataracts in her mother; Colon cancer in her maternal aunt and paternal aunt; Colon cancer (age of onset: 74) in her father; Diabetes in her mother and another family member; Hearing loss in her mother; Heart disease in her maternal grandfather, maternal grandmother, and paternal grandfather; Hypertension in her maternal aunt, mother, and sister; Insulin resistance in her sister; Kidney cancer in her maternal aunt; Retinal detachment in her mother; Thyroid disease in her sister.  She  reports that she has never smoked. She has never used smokeless tobacco. She reports current alcohol use of about 7.0 standard drinks of alcohol per week. She reports that she does not use drugs.  She has a current medication list which includes the following prescription(s): biotin, vitamin b complex, ascorbic acid/multivit-min, calcium-vitamin d3, cholecalciferol (vitamin d3), coq10 (ubiquinol), fish oil-omega-3 fatty acids, levothyroxine, losartan, magnesium citrate, multivitamin with minerals, and vit a/c/e ac/znox/cupric oxide.  Current Outpatient Medications on File Prior to Visit   Medication Sig Dispense Refill    biotin 1 mg Cap       vitamin B complex (B COMPLEX 1 ORAL)       ascorbic acid/multivit-min (EMERGEN-C IMMUNE PLUS ORAL)       calcium-vitamin D3 500 mg(1,250mg) -200 unit per tablet Take 1 tablet by mouth 2 (two) times  daily with meals.      cholecalciferol, vitamin D3, 125 mcg (5,000 unit) Tab       coQ10, ubiquinol, 200 mg Cap Take by mouth.      fish oil-omega-3 fatty acids 300-1,000 mg capsule Take 2 g by mouth once daily.      levothyroxine (SYNTHROID) 50 MCG tablet TAKE ONE TABLET (50MG) BY MOUTH DAILY 90 tablet 3    losartan (COZAAR) 25 MG tablet TAKE ONE TABLET BY MOUTH EVERY DAY 90 tablet 3    MAGNESIUM CITRATE ORAL       multivitamin with minerals tablet Take 1 tablet by mouth once daily.      vit A/C/E ac/ZnOx/cupric oxide (EYE VITAMIN AND MINERALS ORAL) Take by mouth.      [DISCONTINUED] ascorbate calcium (PABLITO-C ORAL)        No current facility-administered medications on file prior to visit.     She has No Known Allergies..      BP Readings from Last 3 Encounters:   10/06/23 124/76   06/30/23 124/72   06/27/23 124/78     Snoring / Sleep:     [place] Questionnaire (validated CHRIST screening questionnaire)    YES -- Snoring/apnea    YES -- Fatigue    Body mass index is 35.21 kg/m².  (>25 is overweight, >30 is obese)    Blood Pressure = Hypertension  (PreHTN 120-139/80-89, Stg1 140-159/90-99, Stg2 >160/>100)  [place] = 3 of three CHRIST categories are positive (high risk is 2-3 positive categories)     [place] Sleepiness Scale TOTAL =      [place] SLEEPINESS SCALE 6/30/2023   Sitting and reading 1   Watching TV 1   Sitting, inactive in a public place (e.g. a theatre or a meeting) 1   As a passenger in a car for an hour without a break 2   Lying down to rest in the afternoon when circumstances permit 2   Sitting and talking to someone 0   Sitting quietly after a lunch without alcohol 1   In a car, while stopped for a few minutes in traffic 0   Total score 8      (validated sleepiness questionnaire with a higher score indicating greater sleepiness; range 0-24)  No flowsheet data found.    STOP-Bang Questionnaire (validated CHRIST screening questionnaire)  Negative unless checked off.  [x] Snoring    [x]  " Tired/Fatigued/Sleepy  [x] Obstruction (apneas/choking)  [x] Pressure (HTN)  [x] BMI >35  [x] Age >50  [] Neck >40 cm  [] Gender male   STOP-Bang = 6 (low risk 0-2,high risk 3-8)    Neck circumference 15"   [?CHRIST risk if >43cm (17in) male or >41cm (15.5 in) female]         MMRC Dyspnea Scale (4 is worst)     [x] MMRC 0: Dyspneic on strenuous excercise (0 points)    [] MMRC 1: Dyspneic on walking a slight hill (0 points)    [] MMRC 2: Dyspneic on walking level ground; must stop occasionally due to breathlessness (1 point)    [] MMRC 3: Must stop for breathlessness after walking 100 yards or after a few minutes (2 points)    [] MMRC 4: Cannot leave house; breathless on dressing/undressing (3 points)                     No flowsheet data found.              Objective:     Vital Signs (Most Recent)  Vital Signs  Pulse: 78  Resp: 17  SpO2: 98 %  BP: 124/76  Height and Weight  Height: 5' 1" (154.9 cm)  Weight: 84.5 kg (186 lb 5.3 oz)  BSA (Calculated - sq m): 1.91 sq meters  BMI (Calculated): 35.2  Weight in (lb) to have BMI = 25: 132]  Wt Readings from Last 2 Encounters:   10/06/23 84.5 kg (186 lb 5.3 oz)   07/28/23 84.1 kg (185 lb 6.5 oz)       Physical Exam  Vitals and nursing note reviewed.   Constitutional:       Appearance: She is normal weight.   HENT:      Head: Normocephalic and atraumatic.      Nose: Nose normal.   Eyes:      Pupils: Pupils are equal, round, and reactive to light.   Cardiovascular:      Rate and Rhythm: Normal rate and regular rhythm.      Pulses: Normal pulses.      Heart sounds: Normal heart sounds. No murmur heard.  Pulmonary:      Effort: Pulmonary effort is normal.      Breath sounds: Normal breath sounds.   Abdominal:      General: Bowel sounds are normal.      Palpations: Abdomen is soft.   Musculoskeletal:      Cervical back: Normal range of motion.   Skin:     General: Skin is warm.   Neurological:      General: No focal deficit present.      Mental Status: She is alert and oriented to " "person, place, and time.   Psychiatric:         Mood and Affect: Mood normal.          Laboratory  Lab Results   Component Value Date    WBC 8.36 06/27/2023    RBC 5.07 06/27/2023    HGB 14.4 06/27/2023    HCT 45.6 06/27/2023    MCV 90 06/27/2023    MCH 28.4 06/27/2023    MCHC 31.6 (L) 06/27/2023    RDW 13.3 06/27/2023     06/27/2023    MPV 9.5 06/27/2023    GRAN 4.4 06/27/2023    GRAN 52.7 06/27/2023    LYMPH 2.8 06/27/2023    LYMPH 33.4 06/27/2023    MONO 0.7 06/27/2023    MONO 8.9 06/27/2023    EOS 0.3 06/27/2023    BASO 0.09 06/27/2023    EOSINOPHIL 3.7 06/27/2023    BASOPHIL 1.1 06/27/2023       BMP  Lab Results   Component Value Date     06/27/2023    K 4.9 06/27/2023     06/27/2023    CO2 24 06/27/2023    BUN 10 06/27/2023    CREATININE 0.9 06/27/2023    CALCIUM 10.0 06/27/2023    ANIONGAP 13 06/27/2023    ESTGFRAFRICA >60.0 06/29/2022    EGFRNONAA >60.0 06/29/2022    AST 35 06/27/2023    ALT 25 06/27/2023    PROT 7.3 06/27/2023          No results found for: "IGE"     No results found for: "ASPERGILLUS"  No results found for: "AFUMIGATUSCL"     No results found for: "ACE"     Diagnostic Results:  I have personally reviewed today the following studies:     PHYSICIAN INTERPRETATION AND COMMENTS: Findings are consistent with severe, positional obstructive sleep  apnea(CHRIST). Therapy with CPAP indicated  CLINICAL HISTORY: 63 year old female presented with: 14 inch neck, BMI of 35.2, an [place] sleepiness score of 9, history  of hypertension and symptoms of nocturnal snoring and witnessed apneas. Based on the clinical history, the patient has a  high pre-test probability of having Moderate CHRIST.  SLEEP STUDY FINDINGS: Patient underwent a 1 night Home Sleep Test and by behavioral criteria, slept for approximately  6.68 hours, with a sleep latency of 6 minutes and a sleep efficiency of 97%. Moderate sleep disordered breathing (AHI=23)  is noted based on a 4% hypopnea desaturation criteria, " predominantly in the supine position (26 events/hour). The patient  slept supine 86.7% of the night based on valid recording time of 5.85 hours and is 26 times as likely to have  apneas/hypopneas when supine. When considering more subtle measures of sleep disordered breathing, the overall  respiratory disturbance index is severe(RDI=54) based on a 1% hypopnea desaturation criteria with confirmation by  surrogate arousal indicators. The apneas/hypopneas are accompanied by minimal oxygen desaturation (percent time  below 90% SpO2: 4%, Min SpO2: 76.2%). The average desaturation across all sleep disordered breathing events is 4%.  Snoring occurs for 15.5% (30 dB) of the study, 7.1% is very loud. The mean pulse rate is 61.1 BPM, with very frequent pulse  rate variability (84 events with >= 6 BPM increase/decrease per hour).  TREATMENT CONSIDERATIONS: Consider nasal continuous positive airway pressure (CPAP/AutoPAP) as the initial  treatment choice for Severe obstructive sleep apnea. A mandibular advancement splint (MAS) or referral to an ENT  surgeon for modification to the airway should be considered to reduce the risk of mortality caused by Severe CHRIST if the  patient prefers an alternative therapy or the CPAP trial is unsuccessful. Based on the CHRIST Supine data in the study, a  Mandibular Advancement Splint (MAS) will likely provide treatment benefit independent of CHRIST severity. The patient  should avoid sleeping supine; the non-supine AHI is 26 times less severe than the supine AHI       Assessment/Plan:     Problem List Items Addressed This Visit       Hypertriglyceridemia (Chronic)    Essential hypertension (Chronic)     Stable on COzaar         Prediabetes    Acquired hypothyroidism     On Synthroid on Synthroid         Obesity (BMI 30-39.9)     Weight loss and exercise         CHRIST (obstructive sleep apnea) - Primary     Sleep study shows severe obstructive sleep apnea  Goals of CPAP discussed   Orders placed             Relevant Orders    CPAP FOR HOME USE      Treatment Options for Sleep Disordered Breathing discussed:     [x]    Continuous Positive Airway Pressure (CPAP).  []    Surgical options  []    Oral appliances   []    Behavioral approaches.   [x]    Weight loss.   []    Avoiding alcohol and sedative medication.  []    Treat other underlying medical conditions eg. nasal allergies  []     INSPIRE  []     Provent/Theravent        Follow up in about 10 weeks (around 12/15/2023), or weigth loss, New CPAP orders,virtual.    This note was prepared using voice recognition system and is likely to have sound alike errors that may have been overlooked even after proof reading.  Please call me with any questions    Discussed diagnosis, its evaluation, treatment and usual course. All questions answered.    Thank you for the courtesy of participating in the care of this patient    Baltazar Panda MD      Personal Diagnostic Review  []  CXR    []  ECHO    []  ONSAT    []  6MWD    []  LABS    []  CHEST CT    []  PET CT    []  Biopsy results

## 2023-10-06 NOTE — PROGRESS NOTES
[facility]   Physical Therapy Treatment Note      Name: Fatuma Akins  Clinic Number: [id number]    Therapy Diagnosis:   Encounter Diagnoses   Name Primary?    Functional gait abnormality Yes    Decreased strength, endurance, and mobility     Abnormal increased muscle tone     Hamstring muscle strain, unspecified laterality, initial encounter      Physician: Mercy Fitzpatrick MD    Visit Date: 10/6/2023    Physician Orders: PT Eval and Treat  Medical Diagnosis from Referral: Acute right-sided low back pain without sciatica [M54.50], Right hip pain [M25.551]  Evaluation Date: 9/15/2023  Authorization Period Expiration: 7/16/2024  Plan of Care Expiration: 12/15/2023  Progress Note Due: 10/15/2023  Visit # / Visits authorized: 3/20 (+1 for evaluation)   FOTO: 1/3 (last performed on 9/15/2023)     Precautions: Standard and hypertension     Time In: 1200  Time Out: 1251  Total Billable Time (timed & untimed codes): 49 minutes    PTA Visit #: 0/5     Subjective     Patient reports: that her hip has been feeling better. Patient reports that she has been working on her exercises more and does see some improvements in her pain although it is still there.     She  was somewhat  compliant with home exercise program.  Response to previous treatment: felt better following  Functional change: No noted functional change    Pain: 0/10  Location: Right sided low back and hip pain    Objective      Objective Measures updated at progress report unless specified.     Treatment     Fatuma received the treatments listed below:      MANUAL THERAPY TECHNIQUES were applied for (0) minutes, including:     Manual Intervention Performed Today     Soft Tissue Mobilization [x]  right glutes, piriformis   Joint Mobilizations []        []        []      Functional Dry Needling  []         Plan for Next Visit: Continue as needed       THERAPEUTIC EXERCISES to develop strength, endurance, ROM, flexibility, posture, and  core stabilization for (8) minutes including:     Intervention Performed Today     Piriformis Stretch x 2x30 second holds B (minimal pain in left hip today)   Recumbent Bike x 5 minutes level 2                                                       Plan for Next Visit:       NEUROMUSCULAR RE-EDUCATION ACTIVITIES to improve Balance, Coordination, Kinesthetic, Sense, Proprioception, and Posture for (23) minutes.  The following were included:     Intervention Performed Today     Abdominal Bracing (progressed/added) X  X  X    X  x 3 second holds x25 reps   + Bent Knee Fall Outs 2x10 reps B   + LE Marches 2x10 reps B isolated green theraband   + Hip Adduction 3x10 reps   + Arm Flexion 2x10 reps from theraball  + Unilateral Lower Trunk Rotation 2x10 reps B   Side Lying Clams x 3 second holds to fatigue green theraband    Bridges x 2x10 reps from theraball   Prone Hip Extension  2x10 reps B   Side Lying Hip Series  X10 reps each direction B (deferred bicycles)                              Plan for Next Visit:       THERAPEUTIC ACTIVITIES to improve dynamic and functional  performance for (18) minutes including:    Intervention Performed Today    [place] Walkouts x Red theraband doubled x15 reps 2 steps out B   Standing Hip Series x 2x10 reps red theraband flexion, abduction and extension   RDLs Double Leg x 2x10 reps working on improving form   Squat Form x Discussed                         Plan for Next Visit:          Patient Education and Home Exercises       Education provided:   PURPOSE: Patient educated on the impairments noted above and the effects of physical therapy intervention to improve overall condition and QOL.   EXERCISE: Patient was educated on all the above exercise prior/during/after for proper posture, positioning, and execution for safe performance with home exercise program.   STRENGTH: Patient educated on the importance of improved core and extremity strength in order to improve alignment of the spine  and extremities with static positions and dynamic movement.     Written Home Exercises Provided: Patient instructed to cont prior HEP. Exercises were reviewed and Fatuma was able to demonstrate them prior to the end of the session.  Fatuma demonstrated good  understanding of the education provided. See Electronic Medical Record under Patient Instructions for exercises provided during therapy sessions    Assessment     Patient tolerated treatment well today. Patient with better abdominal engagement today with the ability to progress previous exercises and incorporate a few additional ones. Patient was able to maintain engagement fairly throughout. Progressed hip strengthening to be performed standing with more significant muscular fatigue and difficulty although with no pain present. Discussed improving form for dead lifts and squats to assist with improving pain long term. Patient will be incorporating new exercises today into home program to progress strength in the next few weeks before next session.     Fatuma Is progressing well towards her goals.   Patient prognosis is Good.     Patient will continue to benefit from skilled outpatient physical therapy to address the deficits listed in the problem list box on initial evaluation, provide pt/family education and to maximize pt's level of independence in the home and community environment.     Patient's spiritual, cultural and educational needs considered and pt agreeable to plan of care and goals.     Anticipated barriers to physical therapy: chronicity of condition and adherence to treatment plan    Goals:   Short Term Goals:  6 weeks Status  Date Met   PAIN: Pt will report worst pain of 4/10 in order to progress toward max functional ability and improve quality of life. [x] Progressing  [] Met  [] Not Met     MOBILITY: Patient will improve AROM to stated goals, listed in objective measures above, in order to progress towards independence with functional activities.   [x] Progressing  [] Met  [] Not Met     STRENGTH: Patient will improve strength to 50% of stated goals, listed in objective measures above, in order to progress towards independence with functional activities. [x] Progressing  [] Met  [] Not Met     POSTURE: Patient will correct postural deviations in sitting and standing, to decrease pain and promote long term stability.  [x] Progressing  [] Met  [] Not Met     HEP: Patient will demonstrate independence with HEP in order to progress toward functional independence. [x] Progressing  [] Met  [] Not Met        Long Term Goals:  12 weeks Status Date Met   PAIN: Pt will report worst pain of 1/10 in order to progress toward max functional ability and improve quality of life [x] Progressing  [] Met  [] Not Met     FUNCTION: Patient will demonstrate improved function as indicated by a score of greater than or equal to 78 out of 100 on FOTO. [x] Progressing  [] Met  [] Not Met     STRENGTH: Patient will improve strength to stated goals, listed in objective measures above, in order to improve functional independence and quality of life. [x] Progressing  [] Met  [] Not Met     GAIT: Patient will demonstrate normalized gait mechanics with minimal compensation in order to return to PLOF. [x] Progressing  [] Met  [] Not Met     Patient will return to normal ADL's, IADL's, community involvement, recreational activities, and work-related activities with less than or equal to 1/10 pain and maximal function.  [x] Progressing  [] Met  [] Not Met          Plan     Continue to progress core/hip program to tolerance.     Zamzam Ford, PT, DPT

## 2023-10-20 ENCOUNTER — CLINICAL SUPPORT (OUTPATIENT)
Dept: REHABILITATION | Facility: HOSPITAL | Age: 63
End: 2023-10-20
Payer: COMMERCIAL

## 2023-10-20 DIAGNOSIS — R53.1 DECREASED STRENGTH, ENDURANCE, AND MOBILITY: ICD-10-CM

## 2023-10-20 DIAGNOSIS — R68.89 DECREASED STRENGTH, ENDURANCE, AND MOBILITY: ICD-10-CM

## 2023-10-20 DIAGNOSIS — R26.89 FUNCTIONAL GAIT ABNORMALITY: Primary | ICD-10-CM

## 2023-10-20 DIAGNOSIS — S76.319A HAMSTRING MUSCLE STRAIN, UNSPECIFIED LATERALITY, INITIAL ENCOUNTER: ICD-10-CM

## 2023-10-20 DIAGNOSIS — Z74.09 DECREASED STRENGTH, ENDURANCE, AND MOBILITY: ICD-10-CM

## 2023-10-20 DIAGNOSIS — M62.89 ABNORMAL INCREASED MUSCLE TONE: ICD-10-CM

## 2023-10-20 PROCEDURE — 97530 THERAPEUTIC ACTIVITIES: CPT

## 2023-10-20 PROCEDURE — 97110 THERAPEUTIC EXERCISES: CPT

## 2023-10-20 PROCEDURE — 97112 NEUROMUSCULAR REEDUCATION: CPT

## 2023-10-20 NOTE — PROGRESS NOTES
[facility]   Physical Therapy Treatment Note + Progress Note     Name: Fatuma Akins  Clinic Number: [id number]    Therapy Diagnosis:   Encounter Diagnoses   Name Primary?    Functional gait abnormality Yes    Decreased strength, endurance, and mobility     Abnormal increased muscle tone     Hamstring muscle strain, unspecified laterality, initial encounter      Physician: Mercy Fitzpatrick MD    Visit Date: 10/20/2023    Physician Orders: PT Eval and Treat  Medical Diagnosis from Referral: Acute right-sided low back pain without sciatica [M54.50], Right hip pain [M25.551]  Evaluation Date: 9/15/2023  Authorization Period Expiration: 7/16/2024  Plan of Care Expiration: 12/15/2023  Progress Note Due: 10/15/2023  Visit # / Visits authorized: 4/20 (+1 for evaluation)   FOTO: 1/3 (last performed on 9/15/2023)     Precautions: Standard and hypertension     Time In: 1502  Time Out: 1559  Total Billable Time (timed & untimed codes): 55 minutes    PTA Visit #: 0/5     Subjective     Patient reports: that she is having no hip pain today. Feels that her pain is improving but did have pain yesterday after walking a bit. Finds that she does have occasional sharp/shooting pain, more nerve like in nature.     She  was somewhat  compliant with home exercise program.  Response to previous treatment: felt better following  Functional change: Less overall pain with all functional activities    Pain: 0/10  Location: Right sided low back and hip pain    Objective      Objective Measures updated at progress report unless specified.     RANGE OF MOTION:   Lumbar ROM Right  (spine) Left    Pain/Dysfunction with Movement Goal   Lumbar Flexion (60º) Finger tips to toes ---  No pain   Lumbar Extension (30º) 15 ---  25 No pain   Lumbar Side Bending (25º) Finger tips to ipsilateral knee Finger tips to ipsilateral knee  No pain   Lumbar Rotation 50% 50%   75%      Hip Range of Motion in Normal and painfree in all  directions     STRENGTH:   L/E MMT Right  (spine) Left Goal   Modified 90/90 90 --- Modified (90/90): Good    Hip Flexion  4+/5 4+/5 4+/5 B   Hip Extension  4/5 4/5 4+/5 B   Hip Abduction  4/5 4/5 4+/5 B   Knee Extension 4+/5 4+/5 5/5 B   Knee Flexion 4/5 4/5 5/5 B   Hip IR 4/5 4/5 4+/5 B   Hip ER 4/5 4/5 4+/5 B      MUSCLE LENGTH:   Muscle Tested  Right Left  Limitation Goal   Hip Flexors [] Normal  [x] Limited [] Normal  [x] Limited   Normal B   ITB / TFL [] Normal  [x] Limited [x] Normal  [] Limited   Normal B   Quadriceps [x] Normal  [] Limited [x] Normal  [] Limited   Normal B   Hamstrings  [x] Normal  [] Limited [x] Normal  [] Limited Neural tension present at end range with dorsiflexion Normal B   Piriformis  [] Normal  [x] Limited [x] Normal  [] Limited   Normal B      JOINT MOBILITY:   Joint Motion  Right Mobility  (spine) Left Mobility Goal   Lumbar PA [] Hypo     [] Normal     [x] Hyper --- Normal      SENSATION  [x] Intact to Light Touch                       [] Impaired:     PALPATION: Muscles: Increased tone and tenderness to palpation of: right glutes, piriformis, hamstrings, left hamstrings, adductors, hip flexors, bilateral lumbar paraspinals        POSTURE:  Pt presents with postural abnormalities which include:               [x] Forward Head                                [x] Increased Lumbar Lordosis              [x] Rounded Shoulder                         [] Genu Recurvatum              [] Increased Thoracic Kyphosis        [] Genu Valgus              [] Trunk Deviated                              [] Pes Planus              [] Scapular Winging                          [] Other:      FOTO:      Treatment     Fatuma received the treatments listed below:      MANUAL THERAPY TECHNIQUES were applied for (3) minutes, including:     Manual Intervention Performed Today     Soft Tissue Mobilization [x]  right glutes, piriformis   Joint Mobilizations []        []        []      Functional Dry Needling  []          Plan for Next Visit: Continue as needed       THERAPEUTIC EXERCISES to develop strength, endurance, ROM, flexibility, posture, and core stabilization for (23) minutes including:     Intervention Performed Today     Piriformis Stretch x 2x30 second holds B   Ambulating on Treadmill x 7 minutes 2.0   Re-assessed Objective Measurements  x                                                Plan for Next Visit:       NEUROMUSCULAR RE-EDUCATION ACTIVITIES to improve Balance, Coordination, Kinesthetic, Sense, Proprioception, and Posture for (17) minutes.  The following were included:     Intervention Performed Today     Abdominal Bracing (progressed/added) X          x 5 second holds x15 reps supported 90/90  + Bent Knee Fall Outs 2x10 reps B   + LE Marches 2x10 reps B isolated green theraband   + Hip Adduction 3x10 reps   + Arm Flexion 2x10 reps from theraball  + Unilateral Lower Trunk Rotation 2x10 reps B   Side Lying Clams x 3 second holds to fatigue green theraband    Bridges x 2x10 reps from theraball   Prone Hip Extension  2x10 reps B   Side Lying Hip Series x X10 reps each direction B (deferred bicycles)   Sciatic Nerve Glides x Right x25 reps                       Plan for Next Visit:       THERAPEUTIC ACTIVITIES to improve dynamic and functional  performance for (12) minutes including:    Intervention Performed Today    Paloff Walkouts  Red theraband doubled x15 reps 2 steps out B   Standing Hip Series x 2x10 reps green theraband, abduction and extension   RDLs Double Leg  2x10 reps working on improving form   Squat Form x 10 lbs 2x10 reps                          Plan for Next Visit:          Patient Education and Home Exercises       Education provided:   PURPOSE: Patient educated on the impairments noted above and the effects of physical therapy intervention to improve overall condition and QOL.   EXERCISE: Patient was educated on all the above exercise prior/during/after for proper posture, positioning,  and execution for safe performance with home exercise program.   STRENGTH: Patient educated on the importance of improved core and extremity strength in order to improve alignment of the spine and extremities with static positions and dynamic movement.     Written Home Exercises Provided: Patient instructed to cont prior HEP. Exercises were reviewed and Fatuma was able to demonstrate them prior to the end of the session.  Fatuam demonstrated good  understanding of the education provided. See Electronic Medical Record under Patient Instructions for exercises provided during therapy sessions    Assessment     Patient tolerated treatment well today. Overall her strength has improved since initial evaluation as well as less pain with all motion. Incorporated sciatic nerve glides, encouraged self myofascial release of gluteal region and piriformis stretch as well as continuing to work on core and hip strength to assist with continuing to improve symptoms for the next few weeks until she decides whether or not to continue in clinic therapy visits if her symptoms are not continuing to trend in a positive direction.     Fatuma Is progressing well towards her goals.   Patient prognosis is Good.     Patient will continue to benefit from skilled outpatient physical therapy to address the deficits listed in the problem list box on initial evaluation, provide pt/family education and to maximize pt's level of independence in the home and community environment.     Patient's spiritual, cultural and educational needs considered and pt agreeable to plan of care and goals.     Anticipated barriers to physical therapy: chronicity of condition and adherence to treatment plan    Goals:   Short Term Goals:  6 weeks Status  Date Met   PAIN: Pt will report worst pain of 4/10 in order to progress toward max functional ability and improve quality of life. [x] Progressing  [] Met  [] Not Met     MOBILITY: Patient will improve AROM to stated  goals, listed in objective measures above, in order to progress towards independence with functional activities.  [x] Progressing  [] Met  [] Not Met     STRENGTH: Patient will improve strength to 50% of stated goals, listed in objective measures above, in order to progress towards independence with functional activities. [x] Progressing  [] Met  [] Not Met     POSTURE: Patient will correct postural deviations in sitting and standing, to decrease pain and promote long term stability.  [x] Progressing  [] Met  [] Not Met     HEP: Patient will demonstrate independence with HEP in order to progress toward functional independence. [x] Progressing  [] Met  [] Not Met        Long Term Goals:  12 weeks Status Date Met   PAIN: Pt will report worst pain of 1/10 in order to progress toward max functional ability and improve quality of life [x] Progressing  [] Met  [] Not Met     FUNCTION: Patient will demonstrate improved function as indicated by a score of greater than or equal to 78 out of 100 on FOTO. [x] Progressing  [] Met  [] Not Met     STRENGTH: Patient will improve strength to stated goals, listed in objective measures above, in order to improve functional independence and quality of life. [x] Progressing  [] Met  [] Not Met     GAIT: Patient will demonstrate normalized gait mechanics with minimal compensation in order to return to PLOF. [x] Progressing  [] Met  [] Not Met     Patient will return to normal ADL's, IADL's, community involvement, recreational activities, and work-related activities with less than or equal to 1/10 pain and maximal function.  [x] Progressing  [] Met  [] Not Met          Plan     Continue to progress core/hip program to tolerance.     Zamzam Ford, PT, DPT

## 2023-10-20 NOTE — PLAN OF CARE
[facility]   Physical Therapy Treatment Note + Progress Note     Name: Fatuma Akins  Clinic Number: [id number]    Therapy Diagnosis:   Encounter Diagnoses   Name Primary?    Functional gait abnormality Yes    Decreased strength, endurance, and mobility     Abnormal increased muscle tone     Hamstring muscle strain, unspecified laterality, initial encounter      Physician: Mercy Fitzpatrick MD    Visit Date: 10/20/2023    Physician Orders: PT Eval and Treat  Medical Diagnosis from Referral: Acute right-sided low back pain without sciatica [M54.50], Right hip pain [M25.551]  Evaluation Date: 9/15/2023  Authorization Period Expiration: 7/16/2024  Plan of Care Expiration: 12/15/2023  Progress Note Due: 10/15/2023  Visit # / Visits authorized: 4/20 (+1 for evaluation)   FOTO: 1/3 (last performed on 9/15/2023)     Precautions: Standard and hypertension     Time In: 1502  Time Out: 1559  Total Billable Time (timed & untimed codes): 55 minutes    PTA Visit #: 0/5     Subjective     Patient reports: that she is having no hip pain today. Feels that her pain is improving but did have pain yesterday after walking a bit. Finds that she does have occasional sharp/shooting pain, more nerve like in nature.     She was somewhat compliant with home exercise program.  Response to previous treatment: felt better following  Functional change: Less overall pain with all functional activities    Pain: 0/10  Location: Right sided low back and hip pain    Objective      Objective Measures updated at progress report unless specified.     RANGE OF MOTION:   Lumbar ROM Right  (spine) Left    Pain/Dysfunction with Movement Goal   Lumbar Flexion (60º) Finger tips to toes ---  No pain   Lumbar Extension (30º) 15 ---  25 No pain   Lumbar Side Bending (25º) Finger tips to ipsilateral knee Finger tips to ipsilateral knee  No pain   Lumbar Rotation 50% 50%   75%      Hip Range of Motion in Normal and painfree in all  directions     STRENGTH:   L/E MMT Right  (spine) Left Goal   Modified 90/90 90 --- Modified (90/90): Good    Hip Flexion  4+/5 4+/5 4+/5 B   Hip Extension  4/5 4/5 4+/5 B   Hip Abduction  4/5 4/5 4+/5 B   Knee Extension 4+/5 4+/5 5/5 B   Knee Flexion 4/5 4/5 5/5 B   Hip IR 4/5 4/5 4+/5 B   Hip ER 4/5 4/5 4+/5 B      MUSCLE LENGTH:   Muscle Tested  Right Left  Limitation Goal   Hip Flexors [] Normal  [x] Limited [] Normal  [x] Limited   Normal B   ITB / TFL [] Normal  [x] Limited [x] Normal  [] Limited   Normal B   Quadriceps [x] Normal  [] Limited [x] Normal  [] Limited   Normal B   Hamstrings  [x] Normal  [] Limited [x] Normal  [] Limited Neural tension present at end range with dorsiflexion Normal B   Piriformis  [] Normal  [x] Limited [x] Normal  [] Limited   Normal B      JOINT MOBILITY:   Joint Motion  Right Mobility  (spine) Left Mobility Goal   Lumbar PA [] Hypo     [] Normal     [x] Hyper --- Normal      SENSATION  [x] Intact to Light Touch                       [] Impaired:     PALPATION: Muscles: Increased tone and tenderness to palpation of: right glutes, piriformis, hamstrings, left hamstrings, adductors, hip flexors, bilateral lumbar paraspinals        POSTURE:  Pt presents with postural abnormalities which include:               [x] Forward Head                                [x] Increased Lumbar Lordosis              [x] Rounded Shoulder                         [] Genu Recurvatum              [] Increased Thoracic Kyphosis        [] Genu Valgus              [] Trunk Deviated                              [] Pes Planus              [] Scapular Winging                          [] Other:      FOTO:      Treatment     Fatuma received the treatments listed below:      MANUAL THERAPY TECHNIQUES were applied for (3) minutes, including:     Manual Intervention Performed Today     Soft Tissue Mobilization [x]  right glutes, piriformis   Joint Mobilizations []        []        []      Functional Dry Needling  []          Plan for Next Visit: Continue as needed       THERAPEUTIC EXERCISES to develop strength, endurance, ROM, flexibility, posture, and core stabilization for (23) minutes including:     Intervention Performed Today     Piriformis Stretch x 2x30 second holds B   Ambulating on Treadmill x 7 minutes 2.0   Re-assessed Objective Measurements  x                                                Plan for Next Visit:       NEUROMUSCULAR RE-EDUCATION ACTIVITIES to improve Balance, Coordination, Kinesthetic, Sense, Proprioception, and Posture for (17) minutes.  The following were included:     Intervention Performed Today     Abdominal Bracing (progressed/added) X          x 5 second holds x15 reps supported 90/90  + Bent Knee Fall Outs 2x10 reps B   + LE Marches 2x10 reps B isolated green theraband   + Hip Adduction 3x10 reps   + Arm Flexion 2x10 reps from theraball  + Unilateral Lower Trunk Rotation 2x10 reps B   Side Lying Clams x 3 second holds to fatigue green theraband    Bridges x 2x10 reps from theraball   Prone Hip Extension  2x10 reps B   Side Lying Hip Series x X10 reps each direction B (deferred bicycles)   Sciatic Nerve Glides x Right x25 reps                       Plan for Next Visit:       THERAPEUTIC ACTIVITIES to improve dynamic and functional  performance for (12) minutes including:    Intervention Performed Today    Paloff Walkouts  Red theraband doubled x15 reps 2 steps out B   Standing Hip Series x 2x10 reps green theraband, abduction and extension   RDLs Double Leg  2x10 reps working on improving form   Squat Form x 10 lbs 2x10 reps                          Plan for Next Visit:          Patient Education and Home Exercises       Education provided:   PURPOSE: Patient educated on the impairments noted above and the effects of physical therapy intervention to improve overall condition and QOL.   EXERCISE: Patient was educated on all the above exercise prior/during/after for proper posture, positioning,  and execution for safe performance with home exercise program.   STRENGTH: Patient educated on the importance of improved core and extremity strength in order to improve alignment of the spine and extremities with static positions and dynamic movement.     Written Home Exercises Provided: Patient instructed to cont prior HEP. Exercises were reviewed and Fatuma was able to demonstrate them prior to the end of the session.  Fatuma demonstrated good  understanding of the education provided. See Electronic Medical Record under Patient Instructions for exercises provided during therapy sessions    Assessment     Patient tolerated treatment well today. Overall her strength has improved since initial evaluation as well as less pain with all motion. Incorporated sciatic nerve glides, encouraged self myofascial release of gluteal region and piriformis stretch as well as continuing to work on core and hip strength to assist with continuing to improve symptoms for the next few weeks until she decides whether or not to continue in clinic therapy visits if her symptoms are not continuing to trend in a positive direction.     Fatuma Is progressing well towards her goals.   Patient prognosis is Good.     Patient will continue to benefit from skilled outpatient physical therapy to address the deficits listed in the problem list box on initial evaluation, provide pt/family education and to maximize pt's level of independence in the home and community environment.     Patient's spiritual, cultural and educational needs considered and pt agreeable to plan of care and goals.     Anticipated barriers to physical therapy: chronicity of condition and adherence to treatment plan    Goals:   Short Term Goals:  6 weeks Status  Date Met   PAIN: Pt will report worst pain of 4/10 in order to progress toward max functional ability and improve quality of life. [x] Progressing  [] Met  [] Not Met     MOBILITY: Patient will improve AROM to stated  goals, listed in objective measures above, in order to progress towards independence with functional activities.  [x] Progressing  [] Met  [] Not Met     STRENGTH: Patient will improve strength to 50% of stated goals, listed in objective measures above, in order to progress towards independence with functional activities. [x] Progressing  [] Met  [] Not Met     POSTURE: Patient will correct postural deviations in sitting and standing, to decrease pain and promote long term stability.  [x] Progressing  [] Met  [] Not Met     HEP: Patient will demonstrate independence with HEP in order to progress toward functional independence. [x] Progressing  [] Met  [] Not Met        Long Term Goals:  12 weeks Status Date Met   PAIN: Pt will report worst pain of 1/10 in order to progress toward max functional ability and improve quality of life [x] Progressing  [] Met  [] Not Met     FUNCTION: Patient will demonstrate improved function as indicated by a score of greater than or equal to 78 out of 100 on FOTO. [x] Progressing  [] Met  [] Not Met     STRENGTH: Patient will improve strength to stated goals, listed in objective measures above, in order to improve functional independence and quality of life. [x] Progressing  [] Met  [] Not Met     GAIT: Patient will demonstrate normalized gait mechanics with minimal compensation in order to return to PLOF. [x] Progressing  [] Met  [] Not Met     Patient will return to normal ADL's, IADL's, community involvement, recreational activities, and work-related activities with less than or equal to 1/10 pain and maximal function.  [x] Progressing  [] Met  [] Not Met          Plan     Continue to progress core/hip program to tolerance.     Zamzam Ford, PT, DPT

## 2023-11-14 ENCOUNTER — IMMUNIZATION (OUTPATIENT)
Dept: FAMILY MEDICINE | Facility: CLINIC | Age: 63
End: 2023-11-14
Payer: COMMERCIAL

## 2023-11-14 DIAGNOSIS — Z23 NEED FOR VACCINATION: Primary | ICD-10-CM

## 2023-11-14 PROCEDURE — 90686 IIV4 VACC NO PRSV 0.5 ML IM: CPT | Mod: S$GLB,,, | Performed by: FAMILY MEDICINE

## 2023-11-14 PROCEDURE — 90471 FLU VACCINE (QUAD) GREATER THAN OR EQUAL TO 3YO PRESERVATIVE FREE IM: ICD-10-PCS | Mod: S$GLB,,, | Performed by: FAMILY MEDICINE

## 2023-11-14 PROCEDURE — 90686 FLU VACCINE (QUAD) GREATER THAN OR EQUAL TO 3YO PRESERVATIVE FREE IM: ICD-10-PCS | Mod: S$GLB,,, | Performed by: FAMILY MEDICINE

## 2023-11-14 PROCEDURE — 90471 IMMUNIZATION ADMIN: CPT | Mod: S$GLB,,, | Performed by: FAMILY MEDICINE

## 2023-12-07 ENCOUNTER — TELEPHONE (OUTPATIENT)
Dept: SLEEP MEDICINE | Facility: CLINIC | Age: 63
End: 2023-12-07
Payer: COMMERCIAL

## 2023-12-07 NOTE — TELEPHONE ENCOUNTER
----- Message from Shanthi Mohan sent at 12/7/2023  3:14 PM CST -----  Contact: Fatuma  Type:  Sooner Apoointment Request    Caller is requesting a sooner appointment. Caller will not accept being placed on the waitlist and is requesting a message be sent to doctor.  Name of Caller:Fatuma  When is the first available appointment?3/15/23  Symptoms: follow-up CPAP  Would the patient rather a call back or a response via [employer]? call  Best Call Back Number:[phone]   Additional Information: Please give patient a call back to assist.   Thank you,  GH

## 2024-01-02 ENCOUNTER — OFFICE VISIT (OUTPATIENT)
Dept: FAMILY MEDICINE | Facility: CLINIC | Age: 64
End: 2024-01-02
Payer: COMMERCIAL

## 2024-01-02 VITALS
HEIGHT: 61 IN | OXYGEN SATURATION: 99 % | RESPIRATION RATE: 20 BRPM | BODY MASS INDEX: 35.61 KG/M2 | TEMPERATURE: 97 F | DIASTOLIC BLOOD PRESSURE: 68 MMHG | WEIGHT: 188.63 LBS | HEART RATE: 104 BPM | SYSTOLIC BLOOD PRESSURE: 110 MMHG

## 2024-01-02 DIAGNOSIS — J01.90 ACUTE RHINOSINUSITIS: Primary | ICD-10-CM

## 2024-01-02 LAB
CTP QC/QA: YES
CTP QC/QA: YES
POC MOLECULAR INFLUENZA A AGN: NEGATIVE
POC MOLECULAR INFLUENZA B AGN: NEGATIVE
SARS-COV-2 RDRP RESP QL NAA+PROBE: NEGATIVE

## 2024-01-02 PROCEDURE — 3078F DIAST BP <80 MM HG: CPT | Mod: CPTII,S$GLB,, | Performed by: FAMILY MEDICINE

## 2024-01-02 PROCEDURE — 3074F SYST BP LT 130 MM HG: CPT | Mod: CPTII,S$GLB,, | Performed by: FAMILY MEDICINE

## 2024-01-02 PROCEDURE — 1159F MED LIST DOCD IN RCRD: CPT | Mod: CPTII,S$GLB,, | Performed by: FAMILY MEDICINE

## 2024-01-02 PROCEDURE — 3008F BODY MASS INDEX DOCD: CPT | Mod: CPTII,S$GLB,, | Performed by: FAMILY MEDICINE

## 2024-01-02 PROCEDURE — 87502 INFLUENZA DNA AMP PROBE: CPT | Mod: QW,S$GLB,, | Performed by: FAMILY MEDICINE

## 2024-01-02 PROCEDURE — 99999 PR PBB SHADOW E&M-EST. PATIENT-LVL IV: CPT | Mod: PBBFAC,,, | Performed by: FAMILY MEDICINE

## 2024-01-02 PROCEDURE — 99213 OFFICE O/P EST LOW 20 MIN: CPT | Mod: S$GLB,,, | Performed by: FAMILY MEDICINE

## 2024-01-02 PROCEDURE — 87635 SARS-COV-2 COVID-19 AMP PRB: CPT | Mod: QW,S$GLB,, | Performed by: FAMILY MEDICINE

## 2024-01-02 RX ORDER — PROMETHAZINE HYDROCHLORIDE AND DEXTROMETHORPHAN HYDROBROMIDE 6.25; 15 MG/5ML; MG/5ML
5 SYRUP ORAL
Qty: 180 ML | Refills: 0 | Status: SHIPPED | OUTPATIENT
Start: 2024-01-02 | End: 2024-01-12

## 2024-01-02 NOTE — PROGRESS NOTES
CHIEF COMPLAINT:  This is a 63-year-old female complaining of persistent respiratory symptoms.    SUBJECTIVE:  Patient had onset of illness one-week ago starting with fever to 101.9, sore throat and fatigue.  Her symptoms have not appreciably improved and include cough with occasional sputum production, head congestion, stopped up ears, and slight shortness of breath.  Patient reports resolution of fever.  She denies chest congestion or wheezing.  She has been taking Mucinex and daytime and nighttime cold medication.  Positive ill contacts.    ROS:  GENERAL: Patient denies chills or night sweats.  Patient denies weight gain or loss. Patient denies anorexia, weakness or swollen glands.  Positive for fever and fatigue.  SKIN: Patient denies rash.  HEENT: Patient denies runny nose, ear pain, hearing loss, visual disturbance, eye irritation or discharge.  Positive for sore throat and nasal congestion.  LUNGS: Patient denies wheezing or hemoptysis.  Positive for cough.  CARDIOVASCULAR: Patient denies chest pain,, palpitations, syncope or lower extremity edema.  Positive for shortness of breath.  GI: Patient denies abdominal pain, nausea, vomiting, diarrhea, constipation, blood in stool or melena.     OBJECTIVE:   GENERAL: Well-developed well-nourished obese white female alert and oriented x3 in no acute distress.  Memory, judgment and cognition without deficit.  No audible wheezing.  SKIN: Clear without rash.  Normal color and tone.  HEENT: Eyes: Clear conjunctivae.  No scleral icterus.  Ears: Clear canals.  Clear TMs.  Nose: Mild bilateral congestion.  Pharynx: Without injection or exudates.  NECK: Supple, normal range of motion.  No lymphadenopathy.    LUNGS:  Clear to auscultation.  Normal respiratory effort.  O2 saturation 99%.  CARDIOVASCULAR: Regular rhythm, normal S1, S2 without murmur, gallop or rub.  EXTREMITIES:  Without cyanosis, clubbing or edema.  Normal range of motion in all extremities.  NEURO:  Gait  without abnormality.  No tremor.    Rapid COVID-19 testing:  Negative.    Rapid influenza testing:  Negative.      ASSESSMENT:  1. Acute rhinosinusitis      PLAN:   1.  Symptomatic treatment.    2.  Keep well hydrated.    3.  Promethazine DM 6 oz.  4.  Follow-up if no improvement or worsening symptoms.    20 minutes of total time spent on the encounter, which includes face to face time and non-face to face time preparing to see the patient (eg, review of tests), Obtaining and/or reviewing separately obtained history, Documenting clinical information in the electronic or other health record, Independently interpreting results (not separately reported) and communicating results to the patient/family/caregiver, or Care coordination (not separately reported).     This note is generated with speech recognition software and is subject to transcription error and sound alike phrases that may be missed by proofreading.

## 2024-01-11 ENCOUNTER — OFFICE VISIT (OUTPATIENT)
Dept: SLEEP MEDICINE | Facility: CLINIC | Age: 64
End: 2024-01-11
Payer: COMMERCIAL

## 2024-01-11 ENCOUNTER — PATIENT MESSAGE (OUTPATIENT)
Dept: ADMINISTRATIVE | Facility: OTHER | Age: 64
End: 2024-01-11
Payer: COMMERCIAL

## 2024-01-11 VITALS
HEART RATE: 90 BPM | SYSTOLIC BLOOD PRESSURE: 118 MMHG | BODY MASS INDEX: 35.75 KG/M2 | DIASTOLIC BLOOD PRESSURE: 62 MMHG | OXYGEN SATURATION: 96 % | HEIGHT: 61 IN | WEIGHT: 189.38 LBS | RESPIRATION RATE: 17 BRPM

## 2024-01-11 DIAGNOSIS — G47.33 OSA (OBSTRUCTIVE SLEEP APNEA): ICD-10-CM

## 2024-01-11 DIAGNOSIS — E66.9 OBESITY (BMI 30-39.9): ICD-10-CM

## 2024-01-11 DIAGNOSIS — I10 ESSENTIAL HYPERTENSION: Primary | Chronic | ICD-10-CM

## 2024-01-11 DIAGNOSIS — E03.9 ACQUIRED HYPOTHYROIDISM: ICD-10-CM

## 2024-01-11 DIAGNOSIS — R73.03 PREDIABETES: ICD-10-CM

## 2024-01-11 DIAGNOSIS — E78.1 HYPERTRIGLYCERIDEMIA: Chronic | ICD-10-CM

## 2024-01-11 PROCEDURE — 1160F RVW MEDS BY RX/DR IN RCRD: CPT | Mod: CPTII,S$GLB,, | Performed by: INTERNAL MEDICINE

## 2024-01-11 PROCEDURE — 99999 PR PBB SHADOW E&M-EST. PATIENT-LVL IV: CPT | Mod: PBBFAC,,, | Performed by: INTERNAL MEDICINE

## 2024-01-11 PROCEDURE — 3078F DIAST BP <80 MM HG: CPT | Mod: CPTII,S$GLB,, | Performed by: INTERNAL MEDICINE

## 2024-01-11 PROCEDURE — 1159F MED LIST DOCD IN RCRD: CPT | Mod: CPTII,S$GLB,, | Performed by: INTERNAL MEDICINE

## 2024-01-11 PROCEDURE — 99214 OFFICE O/P EST MOD 30 MIN: CPT | Mod: S$GLB,,, | Performed by: INTERNAL MEDICINE

## 2024-01-11 PROCEDURE — 3074F SYST BP LT 130 MM HG: CPT | Mod: CPTII,S$GLB,, | Performed by: INTERNAL MEDICINE

## 2024-01-11 PROCEDURE — 3008F BODY MASS INDEX DOCD: CPT | Mod: CPTII,S$GLB,, | Performed by: INTERNAL MEDICINE

## 2024-01-11 NOTE — ASSESSMENT & PLAN NOTE
1/11/2024     3:10 PM   EPWORTH SLEEPINESS SCALE   Sitting and reading 2   Watching TV 2   Sitting, inactive in a public place (e.g. a theatre or a meeting) 1   As a passenger in a car for an hour without a break 2   Lying down to rest in the afternoon when circumstances permit 3   Sitting and talking to someone 0   Sitting quietly after a lunch without alcohol 2   In a car, while stopped for a few minutes in traffic 0   Total score 12       Data 12/11/2023 to 01/09/2024  Usage > 4 hrs  was 100%  APAP 5-20  AHI 2.7    USING AND BENEFITS

## 2024-01-11 NOTE — PROGRESS NOTES
Pulmonary Outpatient  Visit     Subjective:       Patient ID: Fatuma Akins is a 63 y.o. female.    Social History     Tobacco Use   Smoking Status Never   Smokeless Tobacco Never            Chief Complaint: Sleep Apnea      Fatuma Akins is 63 y.o.  Referred by Mercy Fitzpatrick MD  Concern about CHRIST  Snoring, gasping at night, apnea  Observed by family  Comorbidity: HTN, BMI, HLD,   Retired  Duration 2 years  Bed time Midnight  Wake time 7 am: sleep latency 15 minutes  Denies RLS or sleep attacks  No signs of narcolepsy  Caffinated beverages after 7 pm  Naps for 60 minutes  +ve for snoring,nocturnal sweating, gasp for air, wake up at night, sleep too little    10/06/2023  Followup  HSAT was +ve for severe CHRIST  AHI 23.0/hr, RDI 54.0/hr  Goals of CPAP discussed       01/11/2024  Followup recent URI  Download  No snoring  Bed time 12MN  Wake time 7am  USING AND BENEFITS                Review of Systems   Constitutional:  Negative for fatigue.   Respiratory:  Negative for apnea, snoring and somnolence.    Psychiatric/Behavioral:  Negative for sleep disturbance.    All other systems reviewed and are negative.      Outpatient Encounter Medications as of 1/11/2024   Medication Sig Dispense Refill    ascorbic acid/multivit-min (EMERGEN-C IMMUNE PLUS ORAL)       biotin 1 mg Cap       calcium-vitamin D3 500 mg(1,250mg) -200 unit per tablet Take 1 tablet by mouth 2 (two) times daily with meals.      cholecalciferol, vitamin D3, 125 mcg (5,000 unit) Tab       coQ10, ubiquinol, 200 mg Cap Take by mouth.      fish oil-omega-3 fatty acids 300-1,000 mg capsule Take 2 g by mouth once daily.      levothyroxine (SYNTHROID) 50 MCG tablet TAKE ONE TABLET (50MG) BY MOUTH DAILY 90 tablet 3    losartan (COZAAR) 25 MG tablet TAKE ONE TABLET BY MOUTH EVERY DAY 90 tablet 3    MAGNESIUM CITRATE ORAL       multivitamin with minerals tablet Take 1 tablet by mouth once daily.       promethazine-dextromethorphan (PROMETHAZINE-DM) 6.25-15 mg/5 mL Syrp Take 5 mLs by mouth every 4 to 6 hours as needed (cough). 180 mL 0    vit A/C/E ac/ZnOx/cupric oxide (EYE VITAMIN AND MINERALS ORAL) Take by mouth.      vitamin B complex (B COMPLEX 1 ORAL)        No facility-administered encounter medications on file as of 1/11/2024.       The following portions of the patient's history were reviewed and updated as appropriate: She  has a past medical history of Atrophic vaginitis, Hypertension, Hypertriglyceridemia, Hypothyroidism, Obesity, and Prediabetes.  She does not have any pertinent problems on file.  She  has a past surgical history that includes Excision basal cell carcinoma and Colonoscopy (N/A, 8/1/2019).  Her family history includes Blindness in her mother; Breast cancer in her maternal aunt; Cataracts in her mother; Colon cancer in her maternal aunt and paternal aunt; Colon cancer (age of onset: 74) in her father; Diabetes in her mother and another family member; Hearing loss in her mother; Heart disease in her maternal grandfather, maternal grandmother, and paternal grandfather; Hypertension in her maternal aunt, mother, and sister; Insulin resistance in her sister; Kidney cancer in her maternal aunt; Retinal detachment in her mother; Thyroid disease in her sister.  She  reports that she has never smoked. She has never used smokeless tobacco. She reports current alcohol use of about 7.0 standard drinks of alcohol per week. She reports that she does not use drugs.  She has a current medication list which includes the following prescription(s): ascorbic acid/multivit-min, biotin, calcium-vitamin d3, cholecalciferol (vitamin d3), coq10 (ubiquinol), fish oil-omega-3 fatty acids, levothyroxine, losartan, magnesium citrate, multivitamin with minerals, promethazine-dextromethorphan, vit a/c/e ac/znox/cupric oxide, and vitamin b complex.  Current Outpatient Medications on File Prior to Visit   Medication  "Sig Dispense Refill    ascorbic acid/multivit-min (EMERGEN-C IMMUNE PLUS ORAL)       biotin 1 mg Cap       calcium-vitamin D3 500 mg(1,250mg) -200 unit per tablet Take 1 tablet by mouth 2 (two) times daily with meals.      cholecalciferol, vitamin D3, 125 mcg (5,000 unit) Tab       coQ10, ubiquinol, 200 mg Cap Take by mouth.      fish oil-omega-3 fatty acids 300-1,000 mg capsule Take 2 g by mouth once daily.      levothyroxine (SYNTHROID) 50 MCG tablet TAKE ONE TABLET (50MG) BY MOUTH DAILY 90 tablet 3    losartan (COZAAR) 25 MG tablet TAKE ONE TABLET BY MOUTH EVERY DAY 90 tablet 3    MAGNESIUM CITRATE ORAL       multivitamin with minerals tablet Take 1 tablet by mouth once daily.      promethazine-dextromethorphan (PROMETHAZINE-DM) 6.25-15 mg/5 mL Syrp Take 5 mLs by mouth every 4 to 6 hours as needed (cough). 180 mL 0    vit A/C/E ac/ZnOx/cupric oxide (EYE VITAMIN AND MINERALS ORAL) Take by mouth.      vitamin B complex (B COMPLEX 1 ORAL)        No current facility-administered medications on file prior to visit.     She has No Known Allergies..      BP Readings from Last 3 Encounters:   01/11/24 118/62   01/02/24 110/68   10/06/23 124/76     S     MMRC Dyspnea Scale (4 is worst)     [x] MMRC 0: Dyspneic on strenuous excercise (0 points)    [] MMRC 1: Dyspneic on walking a slight hill (0 points)    [] MMRC 2: Dyspneic on walking level ground; must stop occasionally due to breathlessness (1 point)    [] MMRC 3: Must stop for breathlessness after walking 100 yards or after a few minutes (2 points)    [] MMRC 4: Cannot leave house; breathless on dressing/undressing (3 points)                     No flowsheet data found.              Objective:     Vital Signs (Most Recent)  Vital Signs  Pulse: 90  Resp: 17  SpO2: 96 %  BP: 118/62  Pain Score: 0-No pain  Height and Weight  Height: 5' 1" (154.9 cm)  Weight: 85.9 kg (189 lb 6 oz)  BSA (Calculated - sq m): 1.92 sq meters  BMI (Calculated): 35.8  Weight in (lb) to have BMI " "= 25: 132]  Wt Readings from Last 2 Encounters:   01/11/24 85.9 kg (189 lb 6 oz)   01/02/24 85.6 kg (188 lb 9.7 oz)       Physical Exam  Vitals and nursing note reviewed.   Constitutional:       Appearance: She is normal weight.   HENT:      Head: Normocephalic and atraumatic.      Nose: Nose normal.   Eyes:      Pupils: Pupils are equal, round, and reactive to light.   Cardiovascular:      Rate and Rhythm: Normal rate and regular rhythm.      Pulses: Normal pulses.      Heart sounds: Normal heart sounds. No murmur heard.  Pulmonary:      Effort: Pulmonary effort is normal.      Breath sounds: Normal breath sounds.   Abdominal:      General: Bowel sounds are normal.      Palpations: Abdomen is soft.   Musculoskeletal:      Cervical back: Normal range of motion.   Skin:     General: Skin is warm.   Neurological:      General: No focal deficit present.      Mental Status: She is alert and oriented to person, place, and time.   Psychiatric:         Mood and Affect: Mood normal.          Laboratory  Lab Results   Component Value Date    WBC 8.36 06/27/2023    RBC 5.07 06/27/2023    HGB 14.4 06/27/2023    HCT 45.6 06/27/2023    MCV 90 06/27/2023    MCH 28.4 06/27/2023    MCHC 31.6 (L) 06/27/2023    RDW 13.3 06/27/2023     06/27/2023    MPV 9.5 06/27/2023    GRAN 4.4 06/27/2023    GRAN 52.7 06/27/2023    LYMPH 2.8 06/27/2023    LYMPH 33.4 06/27/2023    MONO 0.7 06/27/2023    MONO 8.9 06/27/2023    EOS 0.3 06/27/2023    BASO 0.09 06/27/2023    EOSINOPHIL 3.7 06/27/2023    BASOPHIL 1.1 06/27/2023       BMP  Lab Results   Component Value Date     06/27/2023    K 4.9 06/27/2023     06/27/2023    CO2 24 06/27/2023    BUN 10 06/27/2023    CREATININE 0.9 06/27/2023    CALCIUM 10.0 06/27/2023    ANIONGAP 13 06/27/2023    ESTGFRAFRICA >60.0 06/29/2022    EGFRNONAA >60.0 06/29/2022    AST 35 06/27/2023    ALT 25 06/27/2023    PROT 7.3 06/27/2023          No results found for: "IGE"     No results found for: " ""ASPERGILLUS"  No results found for: "AFUMIGATUSCL"     No results found for: "ACE"     Diagnostic Results:  I have personally reviewed today the following studies:     PHYSICIAN INTERPRETATION AND COMMENTS: Findings are consistent with severe, positional obstructive sleep  apnea(CHRIST). Therapy with CPAP indicated  CLINICAL HISTORY: 63 year old female presented with: 14 inch neck, BMI of 35.2, an [place] sleepiness score of 9, history  of hypertension and symptoms of nocturnal snoring and witnessed apneas. Based on the clinical history, the patient has a  high pre-test probability of having Moderate CHRIST.  SLEEP STUDY FINDINGS: Patient underwent a 1 night Home Sleep Test and by behavioral criteria, slept for approximately  6.68 hours, with a sleep latency of 6 minutes and a sleep efficiency of 97%. Moderate sleep disordered breathing (AHI=23)  is noted based on a 4% hypopnea desaturation criteria, predominantly in the supine position (26 events/hour). The patient  slept supine 86.7% of the night based on valid recording time of 5.85 hours and is 26 times as likely to have  apneas/hypopneas when supine. When considering more subtle measures of sleep disordered breathing, the overall  respiratory disturbance index is severe(RDI=54) based on a 1% hypopnea desaturation criteria with confirmation by  surrogate arousal indicators. The apneas/hypopneas are accompanied by minimal oxygen desaturation (percent time  below 90% SpO2: 4%, Min SpO2: 76.2%). The average desaturation across all sleep disordered breathing events is 4%.  Snoring occurs for 15.5% (30 dB) of the study, 7.1% is very loud. The mean pulse rate is 61.1 BPM, with very frequent pulse  rate variability (84 events with >= 6 BPM increase/decrease per hour).  TREATMENT CONSIDERATIONS: Consider nasal continuous positive airway pressure (CPAP/AutoPAP) as the initial  treatment choice for Severe obstructive sleep apnea. A mandibular advancement splint (MAS) or " referral to an ENT  surgeon for modification to the airway should be considered to reduce the risk of mortality caused by Severe CHRIST if the  patient prefers an alternative therapy or the CPAP trial is unsuccessful. Based on the CHRIST Supine data in the study, a  Mandibular Advancement Splint (MAS) will likely provide treatment benefit independent of CHRIST severity. The patient  should avoid sleeping supine; the non-supine AHI is 26 times less severe than the supine AHI     2023 - 2024  Patient ID: [id number]  : 1960  Age: 63 years  Gender: Female  [facility]  [id number] [place] [address] [address]  [place], [address]  Compliance Report  Compliance  Payor Standard  Usage 2023 - 2024  Usage days 30/30 days (100%)  >= 4 hours 30 days (100%)  < 4 hours 0 days (0%)  Usage hours 297 hours 8 minutes  Average usage (total days) 9 hours 54 minutes  Average usage (days used) 9 hours 54 minutes  Median usage (days used) 9 hours 34 minutes  Total used hours (value since last reset - 2024) 530 hours  AirSense 11 AutoSet  Serial number [id number]  Mode AutoSet  Min Pressure 5 cmH2O  Max Pressure 20 cmH2O  EPR Fulltime  EPR level 3  Response Standard  Therapy  Pressure - cmH2O Median: 7.5 95th percentile: 9.8 Maximum: 11.5  Leaks - L/min Median: 0.4 95th percentile: 11.7 Maximum: 25.2  Events per hour AI: 2.5 HI: 0.2 AHI: 2.7  Apnea Index Central: 0.6 Obstructive: 1.7 Unknown: 0.1  RERA Index 0.1  Assessment/Plan:     Problem List Items Addressed This Visit       Hypertriglyceridemia (Chronic)    Essential hypertension - Primary (Chronic)    Prediabetes    Acquired hypothyroidism    Obesity (BMI 30-39.9)    CHRIST (obstructive sleep apnea)         2024     3:10 PM   EPWORTH SLEEPINESS SCALE   Sitting and reading 2   Watching TV 2   Sitting, inactive in a public place (e.g. a theatre or a meeting) 1   As a passenger in a car for an hour without a break 2   Lying down to rest in the afternoon when  circumstances permit 3   Sitting and talking to someone 0   Sitting quietly after a lunch without alcohol 2   In a car, while stopped for a few minutes in traffic 0   Total score 12     Data 12/11/2023 to 01/09/2024  Usage > 4 hrs  was 100%  APAP 5-20  AHI 2.7    USING AND BENEFITS                   Follow up in about 1 year (around 1/11/2025), or weight loss, download.    This note was prepared using voice recognition system and is likely to have sound alike errors that may have been overlooked even after proof reading.  Please call me with any questions    Discussed diagnosis, its evaluation, treatment and usual course. All questions answered.    Thank you for the courtesy of participating in the care of this patient    Baltazar Panda MD      Personal Diagnostic Review  []  CXR    []  ECHO    []  ONSAT    []  6MWD    []  LABS    []  CHEST CT    []  PET CT    []  Biopsy results

## 2024-01-19 ENCOUNTER — PATIENT MESSAGE (OUTPATIENT)
Dept: OTHER | Facility: OTHER | Age: 64
End: 2024-01-19
Payer: COMMERCIAL

## 2024-01-20 ENCOUNTER — PATIENT MESSAGE (OUTPATIENT)
Dept: OTHER | Facility: OTHER | Age: 64
End: 2024-01-20
Payer: COMMERCIAL

## 2024-02-01 ENCOUNTER — OFFICE VISIT (OUTPATIENT)
Dept: DERMATOLOGY | Facility: CLINIC | Age: 64
End: 2024-02-01
Payer: COMMERCIAL

## 2024-02-01 DIAGNOSIS — L82.1 SEBORRHEIC KERATOSIS: ICD-10-CM

## 2024-02-01 DIAGNOSIS — D18.01 HEMANGIOMA OF SKIN: ICD-10-CM

## 2024-02-01 DIAGNOSIS — Z85.828 HISTORY OF SKIN CANCER: ICD-10-CM

## 2024-02-01 DIAGNOSIS — L90.5 SCAR CONDITIONS/SKIN FIBROSIS: Primary | ICD-10-CM

## 2024-02-01 DIAGNOSIS — Z12.83 SCREENING, MALIGNANT NEOPLASM, SKIN: ICD-10-CM

## 2024-02-01 PROCEDURE — 1159F MED LIST DOCD IN RCRD: CPT | Mod: CPTII,S$GLB,, | Performed by: DERMATOLOGY

## 2024-02-01 PROCEDURE — 99999 PR PBB SHADOW E&M-EST. PATIENT-LVL III: CPT | Mod: PBBFAC,,, | Performed by: DERMATOLOGY

## 2024-02-01 PROCEDURE — 1160F RVW MEDS BY RX/DR IN RCRD: CPT | Mod: CPTII,S$GLB,, | Performed by: DERMATOLOGY

## 2024-02-01 PROCEDURE — 99214 OFFICE O/P EST MOD 30 MIN: CPT | Mod: S$GLB,,, | Performed by: DERMATOLOGY

## 2024-02-01 NOTE — PROGRESS NOTES
Subjective:       Patient ID:  Fatuma Akins is a 63 y.o. female who presents for   Chief Complaint   Patient presents with    Skin Check     FBSE.     Hx of NMSC of the nose (s/p Mohs by Dr. Elizabeth > 10 years ago) and SK's, last seen on 10/6/22.  She c/o lesion of the right chest x 7 months.  Appeared bruise-like.  Gradually faded.  Here today for annual skin check. Denies bleeding, tender, growing, or concerning lesions.     This is a high risk patient here to check for the development of new lesions.              Review of Systems   Constitutional:  Negative for fever and chills.   Gastrointestinal:  Negative for nausea and vomiting.   Skin:  Positive for activity-related sunscreen use. Negative for daily sunscreen use and recent sunburn.   Hematologic/Lymphatic: Does not bruise/bleed easily.        Objective:    Physical Exam   Constitutional: She appears well-developed and well-nourished. No distress.   Neurological: She is alert and oriented to person, place, and time. She is not disoriented.   Psychiatric: She has a normal mood and affect.   Skin:   Areas Examined (abnormalities noted in diagram):   Scalp / Hair Palpated and Inspected  Head / Face Inspection Performed  Neck Inspection Performed  Chest / Axilla Inspection Performed  Abdomen Inspection Performed  Genitals / Buttocks / Groin Inspection Performed  Back Inspection Performed  RUE Inspected  LUE Inspection Performed  RLE Inspected  LLE Inspection Performed  Nails and Digits Inspection Performed                   Diagram Legend     Erythematous scaling macule/papule c/w actinic keratosis       Vascular papule c/w angioma      Pigmented verrucoid papule/plaque c/w seborrheic keratosis      Yellow umbilicated papule c/w sebaceous hyperplasia      Irregularly shaped tan macule c/w lentigo     1-2 mm smooth white papules consistent with Milia      Movable subcutaneous cyst with punctum c/w epidermal inclusion cyst      Subcutaneous movable cyst c/w  pilar cyst      Firm pink to brown papule c/w dermatofibroma      Pedunculated fleshy papule(s) c/w skin tag(s)      Evenly pigmented macule c/w junctional nevus     Mildly variegated pigmented, slightly irregular-bordered macule c/w mildly atypical nevus      Flesh colored to evenly pigmented papule c/w intradermal nevus       Pink pearly papule/plaque c/w basal cell carcinoma      Erythematous hyperkeratotic cursted plaque c/w SCC      Surgical scar with no sign of skin cancer recurrence      Open and closed comedones      Inflammatory papules and pustules      Verrucoid papule consistent consistent with wart     Erythematous eczematous patches and plaques     Dystrophic onycholytic nail with subungual debris c/w onychomycosis     Umbilicated papule    Erythematous-base heme-crusted tan verrucoid plaque consistent with inflamed seborrheic keratosis     Erythematous Silvery Scaling Plaque c/w Psoriasis     See annotation      Assessment / Plan:        Scar conditions/skin fibrosis  Screening, malignant neoplasm, skin  History of skin cancer  Scar of the nose, hx of NMSC.  No evidence of recurrence on physical exam today.  Continue routine skin surveillance. Daily sunscreen advised.    Hemangioma of skin  Reassurance given.  Lesions are benign.    Seborrheic keratosis  Reassurance given. Discussed diagnosis and that lesions are benign.  AAD handout given.            Follow up in about 1 year (around 2/1/2025).

## 2024-05-21 ENCOUNTER — PATIENT MESSAGE (OUTPATIENT)
Dept: ADMINISTRATIVE | Facility: HOSPITAL | Age: 64
End: 2024-05-21
Payer: COMMERCIAL

## 2024-06-18 ENCOUNTER — OFFICE VISIT (OUTPATIENT)
Dept: FAMILY MEDICINE | Facility: CLINIC | Age: 64
End: 2024-06-18
Payer: COMMERCIAL

## 2024-06-18 ENCOUNTER — LAB VISIT (OUTPATIENT)
Dept: LAB | Facility: HOSPITAL | Age: 64
End: 2024-06-18
Attending: FAMILY MEDICINE
Payer: COMMERCIAL

## 2024-06-18 VITALS
OXYGEN SATURATION: 98 % | SYSTOLIC BLOOD PRESSURE: 124 MMHG | TEMPERATURE: 99 F | HEIGHT: 61 IN | WEIGHT: 190.94 LBS | DIASTOLIC BLOOD PRESSURE: 82 MMHG | HEART RATE: 78 BPM | BODY MASS INDEX: 36.05 KG/M2

## 2024-06-18 DIAGNOSIS — Z00.00 PREVENTATIVE HEALTH CARE: Primary | ICD-10-CM

## 2024-06-18 DIAGNOSIS — R73.03 PREDIABETES: ICD-10-CM

## 2024-06-18 DIAGNOSIS — Z12.11 COLON CANCER SCREENING: ICD-10-CM

## 2024-06-18 DIAGNOSIS — Z12.31 ENCOUNTER FOR SCREENING MAMMOGRAM FOR MALIGNANT NEOPLASM OF BREAST: ICD-10-CM

## 2024-06-18 DIAGNOSIS — E66.01 SEVERE OBESITY (BMI 35.0-39.9) WITH COMORBIDITY: ICD-10-CM

## 2024-06-18 DIAGNOSIS — Z12.4 CERVICAL CANCER SCREENING: ICD-10-CM

## 2024-06-18 DIAGNOSIS — Z00.00 PREVENTATIVE HEALTH CARE: ICD-10-CM

## 2024-06-18 DIAGNOSIS — I10 ESSENTIAL HYPERTENSION: Chronic | ICD-10-CM

## 2024-06-18 DIAGNOSIS — G47.33 OSA (OBSTRUCTIVE SLEEP APNEA): ICD-10-CM

## 2024-06-18 PROBLEM — Z74.09 DECREASED STRENGTH, ENDURANCE, AND MOBILITY: Status: RESOLVED | Noted: 2023-09-19 | Resolved: 2024-06-18

## 2024-06-18 PROBLEM — S76.319A HAMSTRING MUSCLE STRAIN: Status: RESOLVED | Noted: 2023-09-19 | Resolved: 2024-06-18

## 2024-06-18 PROBLEM — R68.89 DECREASED STRENGTH, ENDURANCE, AND MOBILITY: Status: RESOLVED | Noted: 2023-09-19 | Resolved: 2024-06-18

## 2024-06-18 PROBLEM — R26.89 FUNCTIONAL GAIT ABNORMALITY: Status: RESOLVED | Noted: 2023-09-19 | Resolved: 2024-06-18

## 2024-06-18 PROBLEM — R53.1 DECREASED STRENGTH, ENDURANCE, AND MOBILITY: Status: RESOLVED | Noted: 2023-09-19 | Resolved: 2024-06-18

## 2024-06-18 PROBLEM — M62.89 ABNORMAL INCREASED MUSCLE TONE: Status: RESOLVED | Noted: 2023-09-19 | Resolved: 2024-06-18

## 2024-06-18 LAB
ALBUMIN SERPL BCP-MCNC: 4.3 G/DL (ref 3.5–5.2)
ALP SERPL-CCNC: 77 U/L (ref 55–135)
ALT SERPL W/O P-5'-P-CCNC: 19 U/L (ref 10–44)
ANION GAP SERPL CALC-SCNC: 7 MMOL/L (ref 8–16)
AST SERPL-CCNC: 21 U/L (ref 10–40)
BASOPHILS # BLD AUTO: 0.08 K/UL (ref 0–0.2)
BASOPHILS NFR BLD: 0.8 % (ref 0–1.9)
BILIRUB SERPL-MCNC: 0.7 MG/DL (ref 0.1–1)
BUN SERPL-MCNC: 12 MG/DL (ref 8–23)
CALCIUM SERPL-MCNC: 9.7 MG/DL (ref 8.7–10.5)
CHLORIDE SERPL-SCNC: 105 MMOL/L (ref 95–110)
CHOLEST SERPL-MCNC: 184 MG/DL (ref 120–199)
CHOLEST/HDLC SERPL: 3.3 {RATIO} (ref 2–5)
CO2 SERPL-SCNC: 26 MMOL/L (ref 23–29)
CREAT SERPL-MCNC: 0.9 MG/DL (ref 0.5–1.4)
DIFFERENTIAL METHOD BLD: NORMAL
EOSINOPHIL # BLD AUTO: 0.2 K/UL (ref 0–0.5)
EOSINOPHIL NFR BLD: 2.4 % (ref 0–8)
ERYTHROCYTE [DISTWIDTH] IN BLOOD BY AUTOMATED COUNT: 13.8 % (ref 11.5–14.5)
EST. GFR  (NO RACE VARIABLE): >60 ML/MIN/1.73 M^2
GLUCOSE SERPL-MCNC: 115 MG/DL (ref 70–110)
HCT VFR BLD AUTO: 43.6 % (ref 37–48.5)
HDLC SERPL-MCNC: 56 MG/DL (ref 40–75)
HDLC SERPL: 30.4 % (ref 20–50)
HGB BLD-MCNC: 14.3 G/DL (ref 12–16)
IMM GRANULOCYTES # BLD AUTO: 0.03 K/UL (ref 0–0.04)
IMM GRANULOCYTES NFR BLD AUTO: 0.3 % (ref 0–0.5)
LDLC SERPL CALC-MCNC: 101.2 MG/DL (ref 63–159)
LYMPHOCYTES # BLD AUTO: 2.9 K/UL (ref 1–4.8)
LYMPHOCYTES NFR BLD: 29.3 % (ref 18–48)
MCH RBC QN AUTO: 29.4 PG (ref 27–31)
MCHC RBC AUTO-ENTMCNC: 32.8 G/DL (ref 32–36)
MCV RBC AUTO: 90 FL (ref 82–98)
MONOCYTES # BLD AUTO: 0.8 K/UL (ref 0.3–1)
MONOCYTES NFR BLD: 8 % (ref 4–15)
NEUTROPHILS # BLD AUTO: 5.9 K/UL (ref 1.8–7.7)
NEUTROPHILS NFR BLD: 59.2 % (ref 38–73)
NONHDLC SERPL-MCNC: 128 MG/DL
NRBC BLD-RTO: 0 /100 WBC
PLATELET # BLD AUTO: 363 K/UL (ref 150–450)
PMV BLD AUTO: 9.9 FL (ref 9.2–12.9)
POTASSIUM SERPL-SCNC: 4.5 MMOL/L (ref 3.5–5.1)
PROT SERPL-MCNC: 7.2 G/DL (ref 6–8.4)
RBC # BLD AUTO: 4.87 M/UL (ref 4–5.4)
SODIUM SERPL-SCNC: 138 MMOL/L (ref 136–145)
TRIGL SERPL-MCNC: 134 MG/DL (ref 30–150)
TSH SERPL DL<=0.005 MIU/L-ACNC: 3.25 UIU/ML (ref 0.4–4)
WBC # BLD AUTO: 9.94 K/UL (ref 3.9–12.7)

## 2024-06-18 PROCEDURE — 80061 LIPID PANEL: CPT | Performed by: FAMILY MEDICINE

## 2024-06-18 PROCEDURE — 85025 COMPLETE CBC W/AUTO DIFF WBC: CPT | Performed by: FAMILY MEDICINE

## 2024-06-18 PROCEDURE — 1159F MED LIST DOCD IN RCRD: CPT | Mod: CPTII,S$GLB,, | Performed by: FAMILY MEDICINE

## 2024-06-18 PROCEDURE — 87624 HPV HI-RISK TYP POOLED RSLT: CPT | Performed by: FAMILY MEDICINE

## 2024-06-18 PROCEDURE — 99396 PREV VISIT EST AGE 40-64: CPT | Mod: S$GLB,,, | Performed by: FAMILY MEDICINE

## 2024-06-18 PROCEDURE — 99999 PR PBB SHADOW E&M-EST. PATIENT-LVL V: CPT | Mod: PBBFAC,,, | Performed by: FAMILY MEDICINE

## 2024-06-18 PROCEDURE — 4010F ACE/ARB THERAPY RXD/TAKEN: CPT | Mod: CPTII,S$GLB,, | Performed by: FAMILY MEDICINE

## 2024-06-18 PROCEDURE — 3074F SYST BP LT 130 MM HG: CPT | Mod: CPTII,S$GLB,, | Performed by: FAMILY MEDICINE

## 2024-06-18 PROCEDURE — 80053 COMPREHEN METABOLIC PANEL: CPT | Performed by: FAMILY MEDICINE

## 2024-06-18 PROCEDURE — 84443 ASSAY THYROID STIM HORMONE: CPT | Performed by: FAMILY MEDICINE

## 2024-06-18 PROCEDURE — 36415 COLL VENOUS BLD VENIPUNCTURE: CPT | Mod: PO | Performed by: FAMILY MEDICINE

## 2024-06-18 PROCEDURE — 3008F BODY MASS INDEX DOCD: CPT | Mod: CPTII,S$GLB,, | Performed by: FAMILY MEDICINE

## 2024-06-18 PROCEDURE — 3079F DIAST BP 80-89 MM HG: CPT | Mod: CPTII,S$GLB,, | Performed by: FAMILY MEDICINE

## 2024-06-18 RX ORDER — SCOLOPAMINE TRANSDERMAL SYSTEM 1 MG/1
1 PATCH, EXTENDED RELEASE TRANSDERMAL
Qty: 4 PATCH | Refills: 0 | Status: SHIPPED | OUTPATIENT
Start: 2024-06-18

## 2024-06-18 NOTE — PROGRESS NOTES
CHIEF COMPLAINT:  This is a 64-year-old female here for preventive health exam.     SUBJECTIVE: The patient is doing well without complaints. She was recently diagnosed with sleep apnea and uses CPAP. Her blood pressure is controlled on losartan 25 mg daily and hydrochlorothiazide 12.5 mg daily.  Today's blood pressure reading is 124/82. She takes levothyroxine for acquired hypothyroidism.  Patient is obese with a BMI of 36.07. Patient has prediabetes with A1c was 5.6% one year ago.     Eye exam October 2023. Pap smear April 2019, due again in April 2024. Mammogram July 2023. Colonoscopy August 2019, due again in August 2024. TD booster April 2019. Flu vaccine November 2023.  COVID 19 vaccine February, March, November 2021, April 2024.     ROS:  GENERAL: Patient denies fever, chills, night sweats. Patient denies weight gain. Patient denies anorexia, fatigue, weakness or swollen glands.  SKIN: Patient denies rash or hair loss.  HEENT: Patient denies sore throat, ear pain, hearing loss, nasal congestion, or runny nose. Patient denies visual disturbance, eye irritation or discharge.  LUNGS: Patient denies cough, wheeze or hemoptysis.  CARDIOVASCULAR: Patient denies chest pain, shortness of breath, palpitations, syncope or lower extremity edema.  GI: Patient denies abdominal pain, nausea, vomiting, diarrhea, constipation, blood in stool or melena.  GENITOURINARY: Patient denies pelvic pain, vaginal discharge, itch or odor. Patient denies irregular vaginal bleeding. Patient denies dysuria, frequency, hematuria, nocturia, urgency or incontinence.  BREASTS: Patient denies breast pain, mass or nipple discharge.  MUSCULOSKELETAL: Patient denies joint pain, swelling, redness or warmth.  NEUROLOGIC: Patient denies headache, vertigo, paresthesias, weakness in limb, dysarthria, dysphagia or abnormality of gait.  PSYCHIATRIC: Patient denies anxiety, depression, or memory loss.     OBJECTIVE:   GENERAL: Well-developed  well-nourished, obese, white female alert and oriented x3, in no acute distress. Memory, judgment and cognition without deficit.  SKIN: Clear without rash. Normal color and tone.  HEENT: Eyes: Clear conjunctivae. Pupils equal reactive to light and accommodation. Ears: Clear TMs. Clear canals. Nose: Without congestion. Pharynx: Without injection or exudates.  NECK: Supple, normal range of motion. No masses, lymphadenopathy or enlarged thyroid. No JVD. Carotids 2+ and equal. No bruits.  LUNGS: Clear to auscultation. Normal respiratory effort.  CARDIOVASCULAR: Regular rhythm, normal S1, S2 without murmur, gallop or rub.  BACK: No CVA or spinal tenderness.  BREASTS: No masses, tenderness or nipple discharge.  ABDOMEN: Normal appearance. Active bowel sounds. Soft, nontender without mass or organomegaly. No rebound or guarding.  EXTREMITIES: Without cyanosis, clubbing or edema. Distal pulses 2+ and equal. Normal range of motion in all extremities. No joint effusion, erythema or warmth.  Negative impingement sign.  Triggering of left middle finger.  NEUROLOGIC: Cranial nerves II through XII without deficit. Motor strength equal bilaterally. Sensation normal to touch. Deep tendon reflexes 2+ and equal. Gait without abnormality. No tremor. Negative cerebellar signs.  PELVIC: External: Without lesions or inflammation. Vaginal:  Scant yellowish discharge.  Atrophic changes. Cervix: Friable, atrophic.  No motion tenderness.  Pap x2.  Uterus: Normal size and shape. Adnexa: Non-tender, without masses. Rectovaginal: Confirms, heme-negative stool x2.    ASSESSMENT:  1. Preventative health care    2. CHRIST (obstructive sleep apnea)    3. Prediabetes    4. Essential hypertension    5. Severe obesity (BMI 35.0-39.9) with comorbidity    6. Cervical cancer screening    7. Encounter for screening mammogram for malignant neoplasm of breast    8. Colon cancer screening      PLAN:  1. Weight reduction. Exercise regularly.  2. Age-appropriate  counseling.  3. Fasting lab.  4. Mammogram.  5. Colonoscopy.  6. Scopolamine patches.    7.  Refill medications as needed.    8.  Follow-up annually.    This note is generated with speech recognition software and is subject to transcription error and sound alike phrases that may be missed by proofreading.

## 2024-07-01 ENCOUNTER — TELEPHONE (OUTPATIENT)
Dept: FAMILY MEDICINE | Facility: CLINIC | Age: 64
End: 2024-07-01
Payer: COMMERCIAL

## 2024-07-10 ENCOUNTER — HOSPITAL ENCOUNTER (OUTPATIENT)
Dept: PREADMISSION TESTING | Facility: HOSPITAL | Age: 64
Discharge: HOME | End: 2024-07-10
Attending: COLON & RECTAL SURGERY
Payer: COMMERCIAL

## 2024-07-10 DIAGNOSIS — Z12.11 COLON CANCER SCREENING: Primary | ICD-10-CM

## 2024-07-10 RX ORDER — SODIUM, POTASSIUM,MAG SULFATES 17.5-3.13G
1 SOLUTION, RECONSTITUTED, ORAL ORAL DAILY
Qty: 1 KIT | Refills: 0 | Status: SHIPPED | OUTPATIENT
Start: 2024-07-10 | End: 2024-07-12

## 2024-07-27 ENCOUNTER — PATIENT MESSAGE (OUTPATIENT)
Dept: ADMINISTRATIVE | Facility: OTHER | Age: 64
End: 2024-07-27
Payer: COMMERCIAL

## 2024-08-03 ENCOUNTER — PATIENT MESSAGE (OUTPATIENT)
Dept: ADMINISTRATIVE | Facility: OTHER | Age: 64
End: 2024-08-03
Payer: COMMERCIAL

## 2024-08-21 DIAGNOSIS — I10 ESSENTIAL HYPERTENSION: ICD-10-CM

## 2024-08-21 RX ORDER — LEVOTHYROXINE SODIUM 50 UG/1
50 TABLET ORAL
Qty: 90 TABLET | Refills: 3 | Status: SHIPPED | OUTPATIENT
Start: 2024-08-21

## 2024-08-21 RX ORDER — LOSARTAN POTASSIUM 25 MG/1
TABLET ORAL
Qty: 90 TABLET | Refills: 3 | Status: SHIPPED | OUTPATIENT
Start: 2024-08-21

## 2024-08-21 NOTE — TELEPHONE ENCOUNTER
No care due was identified.  [facility] Embedded Care Due Messages. Reference number: [id number].   8/21/2024 9:44:41 AM CDT

## 2024-08-22 ENCOUNTER — HOSPITAL ENCOUNTER (OUTPATIENT)
Dept: RADIOLOGY | Facility: HOSPITAL | Age: 64
Discharge: HOME | End: 2024-08-22
Attending: FAMILY MEDICINE
Payer: COMMERCIAL

## 2024-08-22 VITALS — BODY MASS INDEX: 36.05 KG/M2 | WEIGHT: 190.94 LBS | HEIGHT: 61 IN

## 2024-08-22 DIAGNOSIS — Z12.31 ENCOUNTER FOR SCREENING MAMMOGRAM FOR MALIGNANT NEOPLASM OF BREAST: ICD-10-CM

## 2024-08-22 PROCEDURE — 77067 SCR MAMMO BI INCL CAD: CPT | Mod: TC

## 2024-08-22 PROCEDURE — 77067 SCR MAMMO BI INCL CAD: CPT | Mod: 26,,, | Performed by: RADIOLOGY

## 2024-08-22 PROCEDURE — 77063 BREAST TOMOSYNTHESIS BI: CPT | Mod: 26,,, | Performed by: RADIOLOGY

## 2024-08-22 NOTE — TELEPHONE ENCOUNTER
Refill Decision Note   Fatuma Akins  is requesting a refill authorization.  Brief Assessment and Rationale for Refill:  Approve     Medication Therapy Plan:         Comments:     Note composed:8:34 PM 08/21/2024

## 2024-09-16 ENCOUNTER — ANESTHESIA EVENT (OUTPATIENT)
Dept: ENDOSCOPY | Facility: HOSPITAL | Age: 64
End: 2024-09-16
Payer: COMMERCIAL

## 2024-09-16 ENCOUNTER — ANESTHESIA (OUTPATIENT)
Dept: ENDOSCOPY | Facility: HOSPITAL | Age: 64
End: 2024-09-16
Payer: COMMERCIAL

## 2024-09-16 ENCOUNTER — HOSPITAL ENCOUNTER (OUTPATIENT)
Facility: HOSPITAL | Age: 64
Discharge: HOME | End: 2024-09-16
Attending: INTERNAL MEDICINE | Admitting: INTERNAL MEDICINE
Payer: COMMERCIAL

## 2024-09-16 VITALS
WEIGHT: 190 LBS | RESPIRATION RATE: 18 BRPM | HEIGHT: 61 IN | SYSTOLIC BLOOD PRESSURE: 120 MMHG | OXYGEN SATURATION: 98 % | HEART RATE: 88 BPM | DIASTOLIC BLOOD PRESSURE: 68 MMHG | BODY MASS INDEX: 35.87 KG/M2 | TEMPERATURE: 98 F

## 2024-09-16 DIAGNOSIS — Z80.0 FAMILY HISTORY OF COLON CANCER: Primary | ICD-10-CM

## 2024-09-16 PROCEDURE — 63600175 PHARM REV CODE 636 W HCPCS: Performed by: NURSE ANESTHETIST, CERTIFIED REGISTERED

## 2024-09-16 PROCEDURE — 25000003 PHARM REV CODE 250: Performed by: NURSE ANESTHETIST, CERTIFIED REGISTERED

## 2024-09-16 PROCEDURE — 37000009 HC ANESTHESIA EA ADD 15 MINS: Performed by: INTERNAL MEDICINE

## 2024-09-16 PROCEDURE — 37000008 HC ANESTHESIA 1ST 15 MINUTES: Performed by: INTERNAL MEDICINE

## 2024-09-16 PROCEDURE — 45380 COLONOSCOPY AND BIOPSY: CPT | Mod: 33,,, | Performed by: INTERNAL MEDICINE

## 2024-09-16 PROCEDURE — 45380 COLONOSCOPY AND BIOPSY: CPT | Mod: 33 | Performed by: INTERNAL MEDICINE

## 2024-09-16 PROCEDURE — 88305 TISSUE EXAM BY PATHOLOGIST: CPT | Performed by: STUDENT IN AN ORGANIZED HEALTH CARE EDUCATION/TRAINING PROGRAM

## 2024-09-16 PROCEDURE — 27201012 HC FORCEPS, HOT/COLD, DISP: Performed by: INTERNAL MEDICINE

## 2024-09-16 RX ORDER — LIDOCAINE HYDROCHLORIDE 10 MG/ML
INJECTION, SOLUTION EPIDURAL; INFILTRATION; INTRACAUDAL; PERINEURAL
Status: DISCONTINUED | OUTPATIENT
Start: 2024-09-16 | End: 2024-09-16

## 2024-09-16 RX ORDER — SODIUM CHLORIDE, SODIUM LACTATE, POTASSIUM CHLORIDE, CALCIUM CHLORIDE 600; 310; 30; 20 MG/100ML; MG/100ML; MG/100ML; MG/100ML
INJECTION, SOLUTION INTRAVENOUS CONTINUOUS PRN
Status: DISCONTINUED | OUTPATIENT
Start: 2024-09-16 | End: 2024-09-16

## 2024-09-16 RX ORDER — PROPOFOL 10 MG/ML
VIAL (ML) INTRAVENOUS
Status: DISCONTINUED | OUTPATIENT
Start: 2024-09-16 | End: 2024-09-16

## 2024-09-16 RX ADMIN — SODIUM CHLORIDE, SODIUM LACTATE, POTASSIUM CHLORIDE, AND CALCIUM CHLORIDE: 600; 310; 30; 20 INJECTION, SOLUTION INTRAVENOUS at 08:09

## 2024-09-16 RX ADMIN — SODIUM CHLORIDE, SODIUM LACTATE, POTASSIUM CHLORIDE, AND CALCIUM CHLORIDE: 600; 310; 30; 20 INJECTION, SOLUTION INTRAVENOUS at 09:09

## 2024-09-16 RX ADMIN — PROPOFOL 30 MG: 10 INJECTION, EMULSION INTRAVENOUS at 08:09

## 2024-09-16 RX ADMIN — LIDOCAINE HYDROCHLORIDE 50 MG: 10 SOLUTION INTRAVENOUS at 08:09

## 2024-09-16 RX ADMIN — PROPOFOL 20 MG: 10 INJECTION, EMULSION INTRAVENOUS at 09:09

## 2024-09-16 RX ADMIN — PROPOFOL 30 MG: 10 INJECTION, EMULSION INTRAVENOUS at 09:09

## 2024-09-16 RX ADMIN — PROPOFOL 100 MG: 10 INJECTION, EMULSION INTRAVENOUS at 08:09

## 2024-09-16 NOTE — [employer] PATIENT INSTRUCTIONS
Discharge Summary/Instructions after an Endoscopic Procedure  Patient Name: Fatuma Akins  Patient MRN: [id number]  Patient YOB: 1960 Monday, September 16, 2024 Carmen Leroy MD  Dear patient,  As a result of recent federal legislation (The Federal Cures Act), you may   receive lab or pathology results from your procedure in your [facility]   account before your physician is able to contact you. Your physician or   their representative will relay the results to you with their   recommendations at their soonest availability.  Thank you,  RESTRICTIONS:  During your procedure today, you received medications for sedation.  These   medications may affect your judgment, balance and coordination.  Therefore,   for 24 hours, you have the following restrictions:   - DO NOT drive a car, operate machinery, make legal/financial decisions,   sign important papers or drink alcohol.    ACTIVITY:  Today: no heavy lifting, straining or running due to procedural   sedation/anesthesia.  The following day: return to full activity including work.  DIET:  Eat and drink normally unless instructed otherwise.     TREATMENT FOR COMMON SIDE EFFECTS:  - Mild abdominal pain, nausea, belching, bloating or excessive gas:  rest,   eat lightly and use a heating pad.  - Sore Throat: treat with throat lozenges and/or gargle with warm salt   water.  - Because air was used during the procedure, expelling large amounts of air   from your rectum or belching is normal.  - If a bowel prep was taken, you may not have a bowel movement for 1-3 days.    This is normal.  SYMPTOMS TO WATCH FOR AND REPORT TO YOUR PHYSICIAN:  1. Abdominal pain or bloating, other than gas cramps.  2. Chest pain.  3. Back pain.  4. Signs of infection such as: chills or fever occurring within 24 hours   after the procedure.  5. Rectal bleeding, which would show as bright red, maroon, or black stools.   (A tablespoon of blood from the rectum is not serious, especially  if   hemorrhoids are present.)  6. Vomiting.  7. Weakness or dizziness.  GO DIRECTLY TO THE NEAREST EMERGENCY ROOM IF YOU HAVE ANY OF THE FOLLOWING:      Difficulty breathing              Chills and/or fever over 101 F   Persistent vomiting and/or vomiting blood   Severe abdominal pain   Severe chest pain   Black, tarry stools   Bleeding- more than one tablespoon   Any other symptom or condition that you feel may need urgent attention  Your doctor recommends these additional instructions:  If any biopsies were taken, your doctors clinic will contact you in 1 to 2   weeks with any results.  - Discharge patient to home (via wheelchair).   - Resume previous diet.   - Continue present medications.   - Await pathology results.   - Repeat colonoscopy in 3 years for surveillance.   - Patient has a contact number available for emergencies.  The signs and   symptoms of potential delayed complications were discussed with the   patient.  Return to normal activities tomorrow.  Written discharge   instructions were provided to the patient.  For questions, problems or results please call your physician Carmen Leroy MD at Work:  [phone]  If you have any questions about the above instructions, call the GI   department at [phone] or call the endoscopy unit at [phone]   from 7am until 3 pm.  [facility] [place], EMERGENCY ROOM PHONE NUMBER:   [phone]  IF A COMPLICATION OR EMERGENCY SITUATION ARISES AND YOU ARE UNABLE TO REACH   YOUR PHYSICIAN - GO DIRECTLY TO THE EMERGENCY ROOM.  I have read or have had read to me these discharge instructions for my   procedure and have received a written copy.  I understand these   instructions and will follow-up with my physician if I have any questions.     __________________________________       _____________________________________  Nurse Signature                                          Patient/Designated   Responsible Party  Signature  MD Carmen Tan MD  9/16/2024 9:13:39 AM  [employer]

## 2024-09-16 NOTE — PLAN OF CARE
Dr Leroy came to bedside and discussed findings. NO N/V,  no abdominal pain, no GI bleeding, and vitals stable.  Pt discharged from unit.

## 2024-09-16 NOTE — ANESTHESIA PREPROCEDURE EVALUATION
09/16/2024  Fatuma Akins is a 64 y.o., female.  Patient Active Problem List   Diagnosis    Essential hypertension    Acquired hypothyroidism    Hypertriglyceridemia    Obesity (BMI 30-39.9)    Chronic pain of both shoulders    Prediabetes    CHRIST (obstructive sleep apnea)    Severe obesity (BMI 35.0-39.9) with comorbidity       Pre-op Assessment    I have reviewed the Patient Summary Reports.     I have reviewed the Nursing Notes. I have reviewed the NPO Status.   I have reviewed the Medications.     Review of Systems  Anesthesia Hx:  No problems with previous Anesthesia             Denies Family Hx of Anesthesia complications.    Denies Personal Hx of Anesthesia complications.                    Social:  Non-Smoker       Hematology/Oncology:  Hematology Normal   Oncology Normal                                   EENT/Dental:  EENT/Dental Normal           Cardiovascular:     Hypertension              ECG has been reviewed.                          Pulmonary:        Sleep Apnea                Renal/:  Renal/ Normal                 Hepatic/GI:  Hepatic/GI Normal                 Musculoskeletal:  Musculoskeletal Normal                Neurological:  Neurology Normal                                      Endocrine:   Hypothyroidism        Obesity / BMI > 30  Dermatological:  Skin Normal    Psych:  Psychiatric Normal                    Physical Exam  General: Well nourished, Cooperative, Alert and Oriented    Airway:  Mallampati: II   Mouth Opening: Normal  TM Distance: Normal  Tongue: Normal  Neck ROM: Normal ROM    Dental:  Intact  Pt denies loose or removable dentition  Heart:  Rate: Normal  Rhythm: Regular Rhythm        Anesthesia Plan  Type of Anesthesia, risks & benefits discussed:    Anesthesia Type: MAC, Gen Natural Airway  Intra-op Monitoring Plan: Standard ASA Monitors  Post Op Pain Control Plan:  multimodal analgesia  Induction:  IV  Informed Consent: Informed consent signed with the Patient and all parties understand the risks and agree with anesthesia plan.  All questions answered.   ASA Score: 2  Day of Surgery Review of History & Physical: H&P Update referred to the surgeon/provider.I have interviewed and examined the patient. I have reviewed the patient's H&P dated: There are no significant changes.     Ready For Surgery From Anesthesia Perspective.     .

## 2024-09-16 NOTE — TRANSFER OF CARE
"Anesthesia Transfer of Care Note    Patient: Fatuma Akins    Procedure(s) Performed: Procedure(s) (LRB):  COLONOSCOPY (N/A)    Patient location: GI    Anesthesia Type: MAC    Transport from OR: Transported from OR on room air with adequate spontaneous ventilation    Post pain: adequate analgesia    Post assessment: no apparent anesthetic complications and tolerated procedure well    Post vital signs: stable    Level of consciousness: responds to stimulation    Nausea/Vomiting: no nausea/vomiting    Complications: none    Transfer of care protocol was followedComments: Report given to PACU RN. Hand Off Tool Used. RN given opportunity to ask questions or clarify concerns. No Concerns verbalized. RN was asked if ready to assume care of patient. RN verbally confirmed. Pt. Left in stable condition. SV. Vital Signs Return to Near Baseline. No s/s of distress noted      Last vitals: Visit Vitals  /69   Pulse 78   Temp 36.5 °C (97.7 °F)   Resp 18   Ht 5' 1" (1.549 m)   Wt 86.2 kg (190 lb)   SpO2 96%   BMI 35.90 kg/m²     "

## 2024-09-16 NOTE — H&P
Short Stay Endoscopy History and Physical    PCP - Mercy Fitzpatrick MD    Procedure - Colonoscopy  ASA - per anesthesia  Mallampati - per anesthesia  History of Anesthesia problems - no  Family history Anesthesia problems -  no     HPI:  This is a 64 y.o. female here for evaluation of :   Active Hospital Problems    Diagnosis  POA    *Family history of colon cancer [Z80.0]  Not Applicable      Resolved Hospital Problems   No resolved problems to display.         Health Maintenance         Date Due Completion Date    RSV Vaccine (Age 60+ and Pregnant patients) (1 - 1-dose 60+ series) Never done ---    Hemoglobin A1c (Prediabetes) 06/27/2024 6/27/2023    Colorectal Cancer Screening 08/01/2024 8/1/2019    Influenza Vaccine (1) 09/01/2024 11/14/2023    Lipid Panel 06/18/2025 6/18/2024    Mammogram 08/22/2025 8/22/2024    Override on 11/30/2012: Done    TETANUS VACCINE 04/09/2029 4/9/2019    Cervical Cancer Screening 06/18/2029 6/18/2024    Override on 11/1/2011: Done              ROS:  CONSTITUTIONAL: Denies weight change,  fatigue, fevers, chills, night sweats.  CARDIOVASCULAR: Denies chest pain, shortness of breath, orthopnea and edema.  RESPIRATORY: Denies cough, hemoptysis, dyspnea, and wheezing.  GI: See HPI.    Medical History:   Past Medical History:   Diagnosis Date    Atrophic vaginitis     Hypertension     Hypertriglyceridemia     Hypothyroidism     Obesity     Prediabetes        Surgical History:   Past Surgical History:   Procedure Laterality Date    BASAL CELL CARCINOMA EXCISION      Nose    COLONOSCOPY N/A 8/1/2019    Procedure: COLONOSCOPY;  Surgeon: Florence Thomas MD;  Location: [facility];  Service: Endoscopy;  Laterality: N/A;       Family History:   Family History   Problem Relation Name Age of Onset    Retinal detachment Mother      Hypertension Mother      Diabetes Mother      Cataracts Mother      Hearing loss Mother      Blindness Mother          Legally blind    Colon cancer Father  74     Hypertension Sister      Thyroid disease Sister          Goiter    Insulin resistance Sister      Heart disease Maternal Grandmother      Heart disease Maternal Grandfather      Heart disease Paternal Grandfather      Hypertension Maternal Aunt      Colon cancer Maternal Aunt      Kidney cancer Maternal Aunt      Breast cancer Maternal Aunt      Colon cancer Paternal Aunt      Diabetes Other          Maternal great-grandfather       Social History:   Social History     Tobacco Use    Smoking status: Never    Smokeless tobacco: Never   Substance Use Topics    Alcohol use: Yes     Alcohol/week: 7.0 standard drinks of alcohol     Types: 7 Glasses of wine per week     Comment: weekends    Drug use: No       Allergies:   Review of patient's allergies indicates:  No Known Allergies    Medications:   No current facility-administered medications on file prior to encounter.     Current Outpatient Medications on File Prior to Encounter   Medication Sig Dispense Refill    ascorbic acid/multivit-min (EMERGEN-C IMMUNE PLUS ORAL)       biotin 1 mg Cap       calcium-vitamin D3 500 mg(1,250mg) -200 unit per tablet Take 1 tablet by mouth 2 (two) times daily with meals.      cholecalciferol, vitamin D3, 125 mcg (5,000 unit) Tab       coQ10, ubiquinol, 200 mg Cap Take by mouth.      fish oil-omega-3 fatty acids 300-1,000 mg capsule Take 2 g by mouth once daily.      MAGNESIUM CITRATE ORAL       multivitamin with minerals tablet Take 1 tablet by mouth once daily.      vit A/C/E ac/ZnOx/cupric oxide (EYE VITAMIN AND MINERALS ORAL) Take by mouth.      vitamin B complex (B COMPLEX 1 ORAL)       scopolamine (TRANSDERM-SCOP) 1.3-1.5 mg (1 mg over 3 days) Place 1 patch onto the skin every 72 hours. 4 patch 0       Physical Exam:  Vital Signs:   Vitals:    09/16/24 0754   BP: 116/69   Pulse: 78   Resp: 18   Temp: 97.7 °F (36.5 °C)     General Appearance: Well appearing in no acute distress  ENT: OP clear  Chest: CTA B  CV: RRR, no m/r/g  Abd:  s/nt/nd/nabs  Ext: no edema    Labs:Reviewed    IMP:  Active Hospital Problems    Diagnosis  POA    *Family history of colon cancer [Z80.0]  Not Applicable      Resolved Hospital Problems   No resolved problems to display.         Plan:   I have explained the risks and benefits of colonoscopy to the patient including but not limited to bleeding, perforation, infection, and death. The patient wishes to proceed.

## 2024-09-16 NOTE — DISCHARGE SUMMARY
[facility] - Endoscopy (Hospital)  Discharge Note  Short Stay    Procedure(s) (LRB):  COLONOSCOPY (N/A)      OUTCOME: Patient tolerated treatment/procedure well without complication and is now ready for discharge.    DISPOSITION: Home or Self Care    FINAL DIAGNOSIS:  Family history of colon cancer    FOLLOWUP: With primary care provider    DISCHARGE INSTRUCTIONS:  No discharge procedures on file.

## 2024-09-16 NOTE — ANESTHESIA POSTPROCEDURE EVALUATION
Anesthesia Post Evaluation    Patient: Fatuma Akins    Procedure(s) Performed: Procedure(s) (LRB):  COLONOSCOPY (N/A)    Final Anesthesia Type: MAC      Patient location during evaluation: GI PACU  Patient participation: Yes- Able to Participate  Level of consciousness: awake and alert  Post-procedure vital signs: reviewed and stable  Pain management: adequate  Airway patency: patent    PONV status at discharge: No PONV  Anesthetic complications: no      Cardiovascular status: blood pressure returned to baseline, hemodynamically stable and stable  Respiratory status: unassisted, room air and spontaneous ventilation  Hydration status: euvolemic  Follow-up not needed.              Vitals Value Taken Time   /68 09/16/24 0925   Temp 36.4 °C (97.5 °F) 09/16/24 0910   Pulse 88 09/16/24 0925   Resp 18 09/16/24 0925   SpO2 98 % 09/16/24 0925         Event Time   Out of Recovery 09:29:36         Pain/Jack Score: Jack Score: 10 (9/16/2024  9:25 AM)

## 2024-09-17 LAB
FINAL PATHOLOGIC DIAGNOSIS: NORMAL
GROSS: NORMAL
Lab: NORMAL

## 2024-11-06 ENCOUNTER — IMMUNIZATION (OUTPATIENT)
Dept: FAMILY MEDICINE | Facility: CLINIC | Age: 64
End: 2024-11-06
Payer: COMMERCIAL

## 2024-11-06 DIAGNOSIS — Z23 NEED FOR VACCINATION: Primary | ICD-10-CM

## 2024-11-06 PROCEDURE — 90471 IMMUNIZATION ADMIN: CPT | Mod: S$GLB,,, | Performed by: FAMILY MEDICINE

## 2024-11-06 PROCEDURE — 90656 IIV3 VACC NO PRSV 0.5 ML IM: CPT | Mod: S$GLB,,, | Performed by: FAMILY MEDICINE

## 2024-12-17 ENCOUNTER — PATIENT MESSAGE (OUTPATIENT)
Dept: ADMINISTRATIVE | Facility: OTHER | Age: 64
End: 2024-12-17
Payer: COMMERCIAL

## 2025-04-30 ENCOUNTER — TELEPHONE (OUTPATIENT)
Dept: DERMATOLOGY | Facility: CLINIC | Age: 65
End: 2025-04-30
Payer: COMMERCIAL

## 2025-04-30 NOTE — TELEPHONE ENCOUNTER
Attempted to call pt in regards to r/s appt on 5/7. No answer. Message left to call back in regards to r/s

## 2025-06-24 ENCOUNTER — OFFICE VISIT (OUTPATIENT)
Dept: FAMILY MEDICINE | Facility: CLINIC | Age: 65
End: 2025-06-24
Payer: COMMERCIAL

## 2025-06-24 ENCOUNTER — HOSPITAL ENCOUNTER (OUTPATIENT)
Dept: RADIOLOGY | Facility: HOSPITAL | Age: 65
Discharge: HOME | End: 2025-06-24
Attending: FAMILY MEDICINE
Payer: COMMERCIAL

## 2025-06-24 ENCOUNTER — RESULTS FOLLOW-UP (OUTPATIENT)
Dept: FAMILY MEDICINE | Facility: CLINIC | Age: 65
End: 2025-06-24

## 2025-06-24 VITALS
WEIGHT: 193.69 LBS | HEART RATE: 86 BPM | OXYGEN SATURATION: 99 % | TEMPERATURE: 97 F | BODY MASS INDEX: 36.57 KG/M2 | DIASTOLIC BLOOD PRESSURE: 88 MMHG | HEIGHT: 61 IN | SYSTOLIC BLOOD PRESSURE: 130 MMHG

## 2025-06-24 DIAGNOSIS — Z00.00 PREVENTATIVE HEALTH CARE: Primary | ICD-10-CM

## 2025-06-24 DIAGNOSIS — M79.18 RIGHT BUTTOCK PAIN: ICD-10-CM

## 2025-06-24 DIAGNOSIS — R73.03 PREDIABETES: ICD-10-CM

## 2025-06-24 DIAGNOSIS — E66.9 OBESITY (BMI 30-39.9): ICD-10-CM

## 2025-06-24 DIAGNOSIS — E03.9 ACQUIRED HYPOTHYROIDISM: ICD-10-CM

## 2025-06-24 DIAGNOSIS — I10 ESSENTIAL HYPERTENSION: Chronic | ICD-10-CM

## 2025-06-24 DIAGNOSIS — Z12.31 ENCOUNTER FOR SCREENING MAMMOGRAM FOR MALIGNANT NEOPLASM OF BREAST: ICD-10-CM

## 2025-06-24 PROBLEM — Z80.0 FAMILY HISTORY OF COLON CANCER: Status: RESOLVED | Noted: 2024-09-16 | Resolved: 2025-06-24

## 2025-06-24 PROCEDURE — 99999 PR PBB SHADOW E&M-EST. PATIENT-LVL IV: CPT | Mod: PBBFAC,,, | Performed by: FAMILY MEDICINE

## 2025-06-24 PROCEDURE — 3079F DIAST BP 80-89 MM HG: CPT | Mod: CPTII,S$GLB,, | Performed by: FAMILY MEDICINE

## 2025-06-24 PROCEDURE — 4010F ACE/ARB THERAPY RXD/TAKEN: CPT | Mod: CPTII,S$GLB,, | Performed by: FAMILY MEDICINE

## 2025-06-24 PROCEDURE — 99396 PREV VISIT EST AGE 40-64: CPT | Mod: S$GLB,,, | Performed by: FAMILY MEDICINE

## 2025-06-24 PROCEDURE — 73502 X-RAY EXAM HIP UNI 2-3 VIEWS: CPT | Mod: 26,RT,, | Performed by: STUDENT IN AN ORGANIZED HEALTH CARE EDUCATION/TRAINING PROGRAM

## 2025-06-24 PROCEDURE — 1159F MED LIST DOCD IN RCRD: CPT | Mod: CPTII,S$GLB,, | Performed by: FAMILY MEDICINE

## 2025-06-24 PROCEDURE — 3075F SYST BP GE 130 - 139MM HG: CPT | Mod: CPTII,S$GLB,, | Performed by: FAMILY MEDICINE

## 2025-06-24 PROCEDURE — 3008F BODY MASS INDEX DOCD: CPT | Mod: CPTII,S$GLB,, | Performed by: FAMILY MEDICINE

## 2025-06-24 PROCEDURE — 73502 X-RAY EXAM HIP UNI 2-3 VIEWS: CPT | Mod: TC,FY,PO,RT

## 2025-06-24 RX ORDER — LEVOTHYROXINE SODIUM 50 UG/1
50 TABLET ORAL
Qty: 90 TABLET | Refills: 3 | Status: SHIPPED | OUTPATIENT
Start: 2025-06-24

## 2025-06-24 RX ORDER — LOSARTAN POTASSIUM 25 MG/1
25 TABLET ORAL DAILY
Qty: 90 TABLET | Refills: 3 | Status: SHIPPED | OUTPATIENT
Start: 2025-06-24

## 2025-06-24 NOTE — PROGRESS NOTES
CHIEF COMPLAINT:  This is a 65-year-old female here for preventive health exam.     SUBJECTIVE: The patient is doing well without complaints right greater left buttock pain. She takes Aleve as needed. She has sleep apnea and uses CPAP. Her blood pressure is controlled on losartan 25 mg daily and hydrochlorothiazide 12.5 mg daily.  Today's blood pressure reading is 130/88. She takes levothyroxine for acquired hypothyroidism.  Patient is obese with a BMI of 36.59. Patient has prediabetes with A1c was 5.6% 2 years ago.     Eye exam May 2025. Pap smear June 2024. Mammogram August 2024. Colonoscopy September 2024 due again in September 2027. TD booster April 2019. Flu vaccine November 2024.  COVID 19 vaccine February, March, November 2021, April 2024.     ROS:  GENERAL: Patient denies fever, chills, night sweats. Patient denies weight gain. Patient denies anorexia, fatigue, weakness or swollen glands.  SKIN: Patient denies rash or hair loss.  HEENT: Patient denies sore throat, ear pain, hearing loss, nasal congestion, or runny nose. Patient denies visual disturbance, eye irritation or discharge.  LUNGS: Patient denies cough, wheeze or hemoptysis.  CARDIOVASCULAR: Patient denies chest pain, shortness of breath, palpitations, syncope or lower extremity edema.  GI: Patient denies abdominal pain, nausea, vomiting, diarrhea, constipation, blood in stool or melena.  GENITOURINARY: Patient denies pelvic pain, vaginal discharge, itch or odor. Patient denies irregular vaginal bleeding. Patient denies dysuria, frequency, hematuria, nocturia, urgency or incontinence.  BREASTS: Patient denies breast pain, mass or nipple discharge.  MUSCULOSKELETAL: Patient denies joint pain, swelling, redness or warmth.  NEUROLOGIC: Patient denies headache, vertigo, paresthesias, weakness in limb, dysarthria, dysphagia or abnormality of gait.  PSYCHIATRIC: Patient denies anxiety, depression, or memory loss.     OBJECTIVE:   GENERAL: Well-developed  well-nourished, obese, white female alert and oriented x3, in no acute distress. Memory, judgment and cognition without deficit.  SKIN: Clear without rash. Normal color and tone.  HEENT: Eyes: Clear conjunctivae. Pupils equal reactive to light and accommodation. Ears: Clear TMs. Clear canals. Nose: Without congestion. Pharynx: Without injection or exudates.  NECK: Supple, normal range of motion. No masses, lymphadenopathy or enlarged thyroid. No JVD. Carotids 2+ and equal. No bruits.  LUNGS: Clear to auscultation. Normal respiratory effort.  CARDIOVASCULAR: Regular rhythm, normal S1, S2 without murmur, gallop or rub.  BACK: No CVA or spinal tenderness.  BREASTS: No masses, tenderness or nipple discharge.  ABDOMEN: Normal appearance. Active bowel sounds. Soft, nontender without mass or organomegaly. No rebound or guarding.  EXTREMITIES: Without cyanosis, clubbing or edema. Distal pulses 2+ and equal. Normal range of motion in all extremities. No joint effusion, erythema or warmth.  Negative impingement sign.  Triggering of left middle finger.  NEUROLOGIC: Cranial nerves II through XII without deficit. Motor strength equal bilaterally. Sensation normal to touch. Deep tendon reflexes 2+ and equal. Gait without abnormality. No tremor. Negative cerebellar signs.  PELVIC: External: Without lesions or inflammation. Vaginal:  Scant yellowish discharge.  Atrophic changes. Cervix: Friable, atrophic.  No motion tenderness.  No Pap.  Uterus: Normal size and shape. Adnexa: Non-tender, without masses. Rectovaginal: Confirms, heme-negative stool x2.    ASSESSMENT:  1. Preventative health care    2. Essential hypertension    3. Acquired hypothyroidism    4. Obesity (BMI 30-39.9)    5. Prediabetes    6. Right buttock pain    7. Encounter for screening mammogram for malignant neoplasm of breast      PLAN:  1. Weight reduction. Exercise regularly.  2. Age-appropriate counseling.  3. Fasting lab.  4. Mammogram.  5. Right hip x-ray.     6. Refill medications.    7. Follow-up annually.    This note is generated with speech recognition software and is subject to transcription error and sound alike phrases that may be missed by proofreading.

## 2025-08-21 ENCOUNTER — PATIENT MESSAGE (OUTPATIENT)
Dept: OTHER | Facility: OTHER | Age: 65
End: 2025-08-21
Payer: COMMERCIAL

## 2025-09-04 ENCOUNTER — HOSPITAL ENCOUNTER (OUTPATIENT)
Dept: RADIOLOGY | Facility: HOSPITAL | Age: 65
Discharge: HOME | End: 2025-09-04
Attending: FAMILY MEDICINE
Payer: COMMERCIAL

## 2025-09-04 VITALS — WEIGHT: 193 LBS | HEIGHT: 61 IN | BODY MASS INDEX: 36.44 KG/M2

## 2025-09-04 DIAGNOSIS — Z12.31 ENCOUNTER FOR SCREENING MAMMOGRAM FOR MALIGNANT NEOPLASM OF BREAST: ICD-10-CM

## 2025-09-04 PROCEDURE — 77067 SCR MAMMO BI INCL CAD: CPT | Mod: TC

## 2025-09-04 PROCEDURE — 77067 SCR MAMMO BI INCL CAD: CPT | Mod: 26,,, | Performed by: STUDENT IN AN ORGANIZED HEALTH CARE EDUCATION/TRAINING PROGRAM

## 2025-09-04 PROCEDURE — 77063 BREAST TOMOSYNTHESIS BI: CPT | Mod: 26,,, | Performed by: STUDENT IN AN ORGANIZED HEALTH CARE EDUCATION/TRAINING PROGRAM
